# Patient Record
Sex: MALE | Race: WHITE | NOT HISPANIC OR LATINO | Employment: OTHER | URBAN - METROPOLITAN AREA
[De-identification: names, ages, dates, MRNs, and addresses within clinical notes are randomized per-mention and may not be internally consistent; named-entity substitution may affect disease eponyms.]

---

## 2017-03-27 DIAGNOSIS — Z13.6 CARDIOVASCULAR SCREENING; LDL GOAL LESS THAN 100: ICD-10-CM

## 2017-03-27 RX ORDER — ATORVASTATIN CALCIUM 20 MG/1
20 TABLET, FILM COATED ORAL DAILY
Qty: 90 TABLET | Refills: 1 | Status: SHIPPED | OUTPATIENT
Start: 2017-03-27 | End: 2017-07-20

## 2017-03-27 NOTE — TELEPHONE ENCOUNTER
atorvastatin (LIPITOR) 20 MG tablet     Last Written Prescription Date: 10/6/16  Last Fill Quantity: 90, # refills: 3  Last Office Visit with G, P or [facility] prescribing provider: 11/8/2016       Lab Results   Component Value Date    CHOL 142 11/02/2015     Lab Results   Component Value Date    HDL 58 11/02/2015     Lab Results   Component Value Date    LDL 70 11/02/2015     Lab Results   Component Value Date    TRIG 70 11/02/2015     Lab Results   Component Value Date    CHOLHDLRATIO 2.4 11/02/2015

## 2017-07-20 ENCOUNTER — TELEPHONE (OUTPATIENT)
Dept: INTERNAL MEDICINE | Facility: CLINIC | Age: 82
End: 2017-07-20

## 2017-07-20 DIAGNOSIS — Z12.5 SPECIAL SCREENING FOR MALIGNANT NEOPLASM OF PROSTATE: Primary | ICD-10-CM

## 2017-07-20 DIAGNOSIS — I10 ESSENTIAL HYPERTENSION, BENIGN: ICD-10-CM

## 2017-07-20 DIAGNOSIS — Z13.6 CARDIOVASCULAR SCREENING; LDL GOAL LESS THAN 100: ICD-10-CM

## 2017-07-20 RX ORDER — IRBESARTAN 150 MG/1
150 TABLET ORAL DAILY
Qty: 90 TABLET | Refills: 3 | Status: SHIPPED | OUTPATIENT
Start: 2017-07-20 | End: 2018-02-12

## 2017-07-20 RX ORDER — ATORVASTATIN CALCIUM 20 MG/1
20 TABLET, FILM COATED ORAL DAILY
Qty: 90 TABLET | Refills: 3 | Status: SHIPPED | OUTPATIENT
Start: 2017-07-20 | End: 2018-02-12

## 2017-08-31 ENCOUNTER — TELEPHONE (OUTPATIENT)
Dept: INTERNAL MEDICINE | Facility: CLINIC | Age: 82
End: 2017-08-31

## 2017-08-31 DIAGNOSIS — I10 ESSENTIAL HYPERTENSION, BENIGN: ICD-10-CM

## 2017-08-31 DIAGNOSIS — R26.89 BALANCE PROBLEMS: Primary | ICD-10-CM

## 2017-08-31 DIAGNOSIS — Z12.5 SPECIAL SCREENING FOR MALIGNANT NEOPLASM OF PROSTATE: ICD-10-CM

## 2017-08-31 DIAGNOSIS — Z13.6 CARDIOVASCULAR SCREENING; LDL GOAL LESS THAN 100: ICD-10-CM

## 2017-08-31 LAB
ALBUMIN SERPL-MCNC: 3.8 G/DL (ref 3.4–5)
ALP SERPL-CCNC: 90 U/L (ref 40–150)
ALT SERPL W P-5'-P-CCNC: 18 U/L (ref 0–70)
ANION GAP SERPL CALCULATED.3IONS-SCNC: 6 MMOL/L (ref 3–14)
AST SERPL W P-5'-P-CCNC: 17 U/L (ref 0–45)
BILIRUB SERPL-MCNC: 0.9 MG/DL (ref 0.2–1.3)
BUN SERPL-MCNC: 22 MG/DL (ref 7–30)
CALCIUM SERPL-MCNC: 9.6 MG/DL (ref 8.5–10.1)
CHLORIDE SERPL-SCNC: 107 MMOL/L (ref 94–109)
CHOLEST SERPL-MCNC: 152 MG/DL
CO2 SERPL-SCNC: 29 MMOL/L (ref 20–32)
CREAT SERPL-MCNC: 1.49 MG/DL (ref 0.66–1.25)
GFR SERPL CREATININE-BSD FRML MDRD: 45 ML/MIN/1.7M2
GLUCOSE SERPL-MCNC: 96 MG/DL (ref 70–99)
HDLC SERPL-MCNC: 73 MG/DL
HGB BLD-MCNC: 14.3 G/DL (ref 13.3–17.7)
LDLC SERPL CALC-MCNC: 59 MG/DL
NONHDLC SERPL-MCNC: 79 MG/DL
POTASSIUM SERPL-SCNC: 4.3 MMOL/L (ref 3.4–5.3)
PROT SERPL-MCNC: 7.3 G/DL (ref 6.8–8.8)
PSA SERPL-ACNC: 1.57 UG/L (ref 0–4)
SODIUM SERPL-SCNC: 142 MMOL/L (ref 133–144)
TRIGL SERPL-MCNC: 101 MG/DL

## 2017-08-31 PROCEDURE — 85018 HEMOGLOBIN: CPT | Performed by: INTERNAL MEDICINE

## 2017-08-31 PROCEDURE — 80061 LIPID PANEL: CPT | Performed by: INTERNAL MEDICINE

## 2017-08-31 PROCEDURE — 36415 COLL VENOUS BLD VENIPUNCTURE: CPT | Performed by: INTERNAL MEDICINE

## 2017-08-31 PROCEDURE — 80053 COMPREHEN METABOLIC PANEL: CPT | Performed by: INTERNAL MEDICINE

## 2017-08-31 PROCEDURE — G0103 PSA SCREENING: HCPCS | Performed by: INTERNAL MEDICINE

## 2017-09-07 ENCOUNTER — TELEPHONE (OUTPATIENT)
Dept: INTERNAL MEDICINE | Facility: CLINIC | Age: 82
End: 2017-09-07

## 2017-09-07 ENCOUNTER — HOSPITAL ENCOUNTER (OUTPATIENT)
Dept: PHYSICAL THERAPY | Facility: CLINIC | Age: 82
Setting detail: THERAPIES SERIES
End: 2017-09-07
Attending: INTERNAL MEDICINE
Payer: MEDICARE

## 2017-09-07 DIAGNOSIS — R26.89 BALANCE PROBLEMS: Primary | ICD-10-CM

## 2017-09-07 PROCEDURE — G8978 MOBILITY CURRENT STATUS: HCPCS | Mod: GP,CJ | Performed by: PHYSICAL THERAPIST

## 2017-09-07 PROCEDURE — G8979 MOBILITY GOAL STATUS: HCPCS | Mod: GP,CJ | Performed by: PHYSICAL THERAPIST

## 2017-09-07 PROCEDURE — 40000719 ZZHC STATISTIC PT DEPARTMENT NEURO VISIT: Performed by: PHYSICAL THERAPIST

## 2017-09-07 PROCEDURE — 97110 THERAPEUTIC EXERCISES: CPT | Mod: GP | Performed by: PHYSICAL THERAPIST

## 2017-09-07 PROCEDURE — 97162 PT EVAL MOD COMPLEX 30 MIN: CPT | Mod: GP | Performed by: PHYSICAL THERAPIST

## 2017-09-07 ASSESSMENT — 6 MINUTE WALK TEST (6MWT)
COMMENTS: NO AD
TOTAL DISTANCE WALKED (FT): 1090

## 2017-09-07 NOTE — PROGRESS NOTES
09/07/17 0800   Quick Adds   Quick Adds Certification   Type of Visit Initial OP PT Evaluation   General Information   Start of Care Date 09/07/17   Referring Physician Dr. Gian Segovia   Orders Evaluate and Treat as Indicated   Order Date 08/31/17   Medical Diagnosis Imbalance   Onset of illness/injury or Date of Surgery 02/13/17   Precautions/Limitations fall precautions   Surgical/Medical history reviewed Yes   Pertinent history of current problem (include personal factors and/or comorbidities that impact the POC) Pt retired from practice in February and has noticed a decline in his walking and arm swing and has had approximately 4  falls in the past 2 years. I'm also concerned about my walking/arm swing for possible Parkinson's symptoms.    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 Works out with a  2x/week at [employer].   Current Community Support Family/friend caregiver   Patient role/Employment history Employed   Living environment House/[facility]   Home/Community Accessibility Comments 2 level house with 2 flights of stairs between floors.    Assistive Devices Comments no AD   Patient/Family Goals Statement I want to improve my balance   General Information Comments [place], wears hearing aides   Fall Risk Screen   Fall screen completed by PT   Per patient - Fall 2 or more times in past year? Yes   Per patient - Fall with injury in past year? No   Timed Up and Go score (seconds) 11.9   Is patient a fall risk? Yes   Fall screen comments Feels the falls occured with stairs or curbs   Pain   Patient currently in pain No   Cognitive Status Examination   Orientation orientation to person, place and  time   Level of Consciousness alert   Follows Commands and Answers Questions 100% of the time   Personal Safety and Judgment intact   Memory intact   Posture   Posture Forward head position;Protracted shoulders   Posture Comments forward flexed hips in standing, eyes postured down.    Range of Motion (ROM)   ROM Comment Doesn't feel there is a difference L/R sides with ROM/coordination. bilateral hip flexor restriction in standing/gait with anterior pelvic tilt.   Bed Mobility   Bed Mobility Comments independent   Transfer Skills   Transfer Comments independent without UE assist   Gait   Gait Comments no AD, forward flexed hips, head, bilateral decreased foot clearance with audible foot scuffing at heel due to decereased swing phase. Limited bilateral arm swing. Lacks full knee extension at bilaterally, eyes postured down   Gait Special Tests Functional Gait Assessment Score out of 30   Score out of 30 21   Gait Special Tests Six Minute Walk Test   Feet 1090 Feet   Comments no AD   Balance Special Tests Modified CTSIB Conditions   Condition 1, seconds 30 Seconds   Condition 2, seconds 30 Seconds   Condition 4, seconds 30 Seconds   Condition 5, seconds 30 Seconds   Balance Special Tests Sit to Stand Reps in 30 Seconds   Reps in 30 seconds 14   Height 17   Comments Last 5 reps came to partial standing   Sensory Examination   Sensory Perception Comments denies N/T   Coordination   Coordination Comments bilateral toe tapping performed, able to keep both toes tapping in sync, right side slightly less ankle DF    Planned Therapy Interventions   Planned Therapy Interventions balance training;gait training;motor coordination training;neuromuscular re-education;stretching;manual therapy   Clinical Impression   Criteria for Skilled Therapeutic Interventions Met yes, treatment indicated   PT Diagnosis difficulty with gait   Influenced by the following impairments field cut, impaired dynamic balance, difficulty dual tasking,  impaired gait mechanics with bilateral decreased foot clearance.   Functional limitations due to impairments difficulty with gait and balance, fall risk, difficutly naviagting stairs   Clinical Presentation Evolving/Changing   Clinical Presentation Rationale concern for Parkinson's Disease/Parkinsonism like symptoms   Clinical Decision Making (Complexity) Moderate complexity   Therapy Frequency 1 time/week   Predicted Duration of Therapy Intervention (days/wks) 45 days   Risk & Benefits of therapy have been explained Yes   Patient, Family & other staff in agreement with plan of care Yes   Education Assessment   Preferred Learning Style Listening   Barriers to Learning Hearing   GOALS   PT Eval Goals 1;2;3   Goal 1   Goal Identifier 6MWT   Goal Description 1. Patient will improved distance ambulated by 150feet on 6MWT in order to improve gait mechanics and speed.   Target Date 10/22/17   Goal 2   Goal Identifier FGA   Goal Description 2. Patient will improve score to greater than 24/30 on the FGA in order to improve safety and balance during dual tasking activities.   Target Date 10/22/17   Total Evaluation Time   Total Evaluation Time (Minutes) 35   Therapy Certification   Certification date from 09/07/17   Certification date to 10/22/17   Medical Diagnosis Imbalance

## 2017-09-07 NOTE — PROGRESS NOTES
[facility]        OUTPATIENT PHYSICAL THERAPY FUNCTIONAL EVALUATION  PLAN OF TREATMENT FOR OUTPATIENT REHABILITATION  (COMPLETE FOR INITIAL CLAIMS ONLY)  Patient's Last Name, First Name, M.I.  YOB: 1935  Leonard Garza     Provider's Name   [facility]   Medical Record No.  [id number]     Start of Care Date:  09/07/17   Onset Date:  02/13/17   Type:     _X__PT   ____OT  ____SLP Medical Diagnosis:  Imbalance     PT Diagnosis:  difficulty with gait Visits from SOC:  1                              __________________________________________________________________________________  Plan of Treatment/Functional Goals:  balance training, gait training, motor coordination training, neuromuscular re-education, stretching, manual therapy           GOALS  6MWT  1. Patient will improved distance ambulated by 150feet on 6MWT in order to improve gait mechanics and speed.  10/22/17    FGA  2. Patient will improve score to greater than 24/30 on the FGA in order to improve safety and balance during dual tasking activities.  10/22/17    Therapy Frequency:  1 time/week   Predicted Duration of Therapy Intervention:  45 days    Nova Goss, PT                                    I CERTIFY THE NEED FOR THESE SERVICES FURNISHED UNDER        THIS PLAN OF TREATMENT AND WHILE UNDER MY CARE     (Physician co-signature of this document indicates review and certification of the therapy plan).                  Certification Date From:  09/07/17   Certification Date To:  10/22/17    Referring Provider:  Dr. Gian Segovia    Initial Assessment  See Epic Evaluation- Start of Care Date: 09/07/17

## 2017-09-20 ENCOUNTER — TRANSFERRED RECORDS (OUTPATIENT)
Dept: HEALTH INFORMATION MANAGEMENT | Facility: CLINIC | Age: 82
End: 2017-09-20

## 2017-09-28 ENCOUNTER — HOSPITAL ENCOUNTER (OUTPATIENT)
Dept: PHYSICAL THERAPY | Facility: CLINIC | Age: 82
Setting detail: THERAPIES SERIES
End: 2017-09-28
Attending: INTERNAL MEDICINE
Payer: MEDICARE

## 2017-09-28 PROCEDURE — 97110 THERAPEUTIC EXERCISES: CPT | Mod: GP | Performed by: PHYSICAL THERAPIST

## 2017-09-28 PROCEDURE — 97116 GAIT TRAINING THERAPY: CPT | Mod: GP | Performed by: PHYSICAL THERAPIST

## 2017-09-28 PROCEDURE — 40000719 ZZHC STATISTIC PT DEPARTMENT NEURO VISIT: Performed by: PHYSICAL THERAPIST

## 2017-10-12 ENCOUNTER — HOSPITAL ENCOUNTER (OUTPATIENT)
Dept: PHYSICAL THERAPY | Facility: CLINIC | Age: 82
Setting detail: THERAPIES SERIES
End: 2017-10-12
Attending: INTERNAL MEDICINE
Payer: MEDICARE

## 2017-10-12 PROCEDURE — 97112 NEUROMUSCULAR REEDUCATION: CPT | Mod: GP | Performed by: PHYSICAL THERAPIST

## 2017-10-12 PROCEDURE — G8979 MOBILITY GOAL STATUS: HCPCS | Mod: GP,CJ | Performed by: PHYSICAL THERAPIST

## 2017-10-12 PROCEDURE — 97110 THERAPEUTIC EXERCISES: CPT | Mod: GP | Performed by: PHYSICAL THERAPIST

## 2017-10-12 PROCEDURE — 40000719 ZZHC STATISTIC PT DEPARTMENT NEURO VISIT: Performed by: PHYSICAL THERAPIST

## 2017-10-12 PROCEDURE — G8980 MOBILITY D/C STATUS: HCPCS | Mod: GP,CJ | Performed by: PHYSICAL THERAPIST

## 2017-10-12 PROCEDURE — 97750 PHYSICAL PERFORMANCE TEST: CPT | Mod: GP | Performed by: PHYSICAL THERAPIST

## 2017-10-12 NOTE — PROGRESS NOTES
Outpatient Physical Therapy Discharge Note     Patient: Leonard Garza  : 1935    Beginning/End Dates of Reporting Period:  17 to 10/12/2017    Referring Provider: Dr. Gian Fuller    Therapy Diagnosis: Difficulty with gait     Client Self Report: Reports that he is doing well, feels that he is paying more attention to his walking.     Objective Measurements:  Objective Measure: 6MWT  Details: 1245  Objective Measure: FGA  Details:       Goals:  Goal Identifier 6MWT   Goal Description 1. Patient will improved distance ambulated by 150feet on 6MWT in order to improve gait mechanics and speed.   Target Date 10/22/17   Date Met   10/12/17   Progress: Improved from 1090 at initial eval to 1245'     Goal Identifier FGA   Goal Description 2. Patient will improve score to greater than 24/30 on the FGA in order to improve safety and balance during dual tasking activities.   Target Date 10/22/17   Date Met   10/12/17   Progress: Improved from 21/30 to 24/30       Progress Toward Goals:   Progress this reporting period: The pt demonstrated improvement in gait speed and balance this reporting period. FGA score improved from 21 to a 42/30, indicating a reduction in fall risk. The pt also improved his 6 minute walk distance from 1090' to 1245'. The pt responds well to cues to lengthen stride and improve foot clearance. Demonstrates understanding with HEP.      Plan:  Discharge from therapy.    Discharge:    Reason for Discharge: Patient has met all goals.    Discharge Plan: Patient to continue home program.

## 2017-10-12 NOTE — PROGRESS NOTES
10/12/17 0800   Signing Clinician's Name / Credentials   Signing clinician's name / credentials SÁNCHEZ Norton PT   Functional Gait Assessment (ALL Johns, LESLIE Lucas, et al. (2004))   1. GAIT LEVEL SURFACE 3   2. CHANGE IN GAIT SPEED 3   3. GAIT WITH HORIZONTAL HEAD TURNS 2   4. GAIT WITH VERTICAL HEAD TURNS 3   5. GAIT AND PIVOT TURN 3   6. STEP OVER OBSTACLE 3   7. GAIT WITH NARROW BASE OF SUPPORT 0   8. GAIT WITH EYES CLOSED 1   9. AMBULATING BACKWARDS 3   10. STEPS 3   Total Functional Gait Assessment Score   TOTAL SCORE: (MAXIMUM SCORE 30) 24   Functional Gait Assessment (FGA): The FGA assesses postural stability during various walking tasks.   Gait assistive device used: no AD     Patient Score: 24/30  Scores of <22/30 have been correlated with predicting falls in community-dwelling older adults according to Nat & Tor 2010.   Scores of <18/30 have been correlated with increased risk for falls in patients with Parkinsons Disease according to Carlos Cao, Ge et al 2014.  Minimal Detectable Change for patients with acute/chronic stroke = 4.2 according to Thishara & Ritschel 2009  Minimal Detectable Change for patients with vestibular disorder = 8 according to Nat & Tor 2010    Assessment (rationale for performing, application to patient s function & care plan): The pt demonstrated improvement with gait speed, stability with head turns, retroambulation and stairs. Continues to be challenged  By narrowed base, EC. Discussed incorporating these into HEP at home, being cautious in low lighting.  Minutes billed as physical performance test: 10

## 2017-11-14 ENCOUNTER — TRANSFERRED RECORDS (OUTPATIENT)
Dept: HEALTH INFORMATION MANAGEMENT | Facility: CLINIC | Age: 82
End: 2017-11-14

## 2017-11-20 ENCOUNTER — TRANSFERRED RECORDS (OUTPATIENT)
Dept: HEALTH INFORMATION MANAGEMENT | Facility: CLINIC | Age: 82
End: 2017-11-20

## 2018-02-12 ENCOUNTER — TELEPHONE (OUTPATIENT)
Dept: INTERNAL MEDICINE | Facility: CLINIC | Age: 83
End: 2018-02-12

## 2018-02-12 DIAGNOSIS — Z13.6 CARDIOVASCULAR SCREENING; LDL GOAL LESS THAN 100: ICD-10-CM

## 2018-02-12 DIAGNOSIS — I10 ESSENTIAL HYPERTENSION, BENIGN: Primary | ICD-10-CM

## 2018-02-12 RX ORDER — IRBESARTAN 150 MG/1
150 TABLET ORAL DAILY
Qty: 90 TABLET | Refills: 3 | Status: SHIPPED | OUTPATIENT
Start: 2018-02-12 | End: 2019-02-25

## 2018-02-12 RX ORDER — ATORVASTATIN CALCIUM 20 MG/1
20 TABLET, FILM COATED ORAL DAILY
Qty: 90 TABLET | Refills: 3 | Status: SHIPPED | OUTPATIENT
Start: 2018-02-12 | End: 2019-02-25

## 2018-02-12 NOTE — TELEPHONE ENCOUNTER
Received a call from patient as is in [place] and needs refills of blood pressure medication.  His wife will

## 2018-04-09 ENCOUNTER — TRANSFERRED RECORDS (OUTPATIENT)
Dept: HEALTH INFORMATION MANAGEMENT | Facility: CLINIC | Age: 83
End: 2018-04-09

## 2018-04-24 ENCOUNTER — HOSPITAL ENCOUNTER (OUTPATIENT)
Dept: SPEECH THERAPY | Facility: CLINIC | Age: 83
Setting detail: THERAPIES SERIES
End: 2018-04-24
Attending: PSYCHIATRY & NEUROLOGY
Payer: MEDICARE

## 2018-04-24 PROCEDURE — G9171 VOICE CURRENT STATUS: HCPCS | Mod: GN,CJ | Performed by: STUDENT IN AN ORGANIZED HEALTH CARE EDUCATION/TRAINING PROGRAM

## 2018-04-24 PROCEDURE — G9172 VOICE GOAL STATUS: HCPCS | Mod: GN,CI | Performed by: STUDENT IN AN ORGANIZED HEALTH CARE EDUCATION/TRAINING PROGRAM

## 2018-04-24 PROCEDURE — 92524 BEHAVRAL QUALIT ANALYS VOICE: CPT | Mod: GN | Performed by: STUDENT IN AN ORGANIZED HEALTH CARE EDUCATION/TRAINING PROGRAM

## 2018-04-24 PROCEDURE — 40000251 ZZH STATISTIC VOICE CENTER VISIT: Performed by: STUDENT IN AN ORGANIZED HEALTH CARE EDUCATION/TRAINING PROGRAM

## 2018-04-25 NOTE — PROGRESS NOTES
"                                                                           [facility]          OUTPATIENT SPEECH LANGUAGE PATHOLOGY VOICE EVALUATION  PLAN OF TREATMENT FOR OUTPATIENT REHABILITATION  (COMPLETE FOR INITIAL CLAIMS ONLY)    Patient's Last Name, First Name, M.I.  YOB: 1935  Leonard Garza                        Provider s Name: [facility] Medical Record No.  [id number]     Onset Date:  4/9/2018 (order date)    Start of Care Date:  4/24/2018   Type:     ___PT  __OT   _X_SLP    Medical Diagnosis: Parkinson's disease   Speech Language Pathology Diagnosis:  Dysphonia, hypokinetic dysarthria    Visits from SOC: 1      _________________________________________________________________________________  Plan of Treatment/Functional Goals:  Voice         Goals     1. Goal Identifier: Generalization       Goal Description: Patient will learn, demonstrate, and implement a \"loud\" compensatory speech intelligibility strategy in order to report a week of typical activities in which dysphonia does not exceed a level of 2 out of 10, 90% of the time so that patient is able to effectively communicate with friends/family in a variety of daily communication situations.       Target Date: 07/23/18   2. Goal Identifier: Breath       Goal Description: Patient will increase vocal loudness to an average SPL of 80 dB at 30 cm during sustained /a/ phonation independently in order to increase vocal respiratory support required for functional communication.       Target Date: 07/23/18   3. Goal Identifier: Conversation       Goal Description: Patient will increase vocal loudness during semi-spontaneous conversation tasks to a minimum range of 75-80 dB at 30 cm given min cues in order to improve vocal loudness for daily communication tasks.       Target Date: 07/23/18                     Frequency and Duration: 4x/week for 4 weeks per LSVT LOUD protocol, with 1-2 monthly " follow-ups.  Allison E. Alpers, SLP       I CERTIFY THE NEED FOR THESE SERVICES FURNISHED UNDER        THIS PLAN OF TREATMENT AND WHILE UNDER MY CARE     (Physician co-signature of this document indicates review and certification of the therapy plan).                Certification Date From:  04/24/18  Certification Date To:  07/23/18  Referring Provider: Bartolome Vila MD               Initial Assessment        See Epic Evaluation Start of Care

## 2018-04-25 NOTE — PROGRESS NOTES
"[facility] OP SLP Voice Evaluation  04/24/18 0800   General Information   Type Of Visit Initial   Start Of Care Date 04/24/18   Referring Physician Bartolome Vila MD  (Neurology)   Orders Evaluate And Treat   Medical Diagnosis Parkinson's disease   Onset Of Illness/injury Or Date Of Surgery 04/09/18  (order date)   Precautions/Limitations fall precautions  (Also seeing PT)   Hearing Hearing loss--wears bilateral hearing aids   Surgical/Medical history reviewed Yes   Pertinent History Of Current Problem 82yo male presenting with voice and gait changes concerning for Parkinson's disease (PD).  Pt reports that his voice is quieter and raspy and his stride has gotten shorter.  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  Pt reports that people occasionally have difficulty understanding him because of his voice, and he is able to increase his vocal loudness during these times to be better understood.  Pt has not noticed any changes in his speech/articulation, but notes that his wife says he sounds like [place], leaving off the \"g\" in words ending with \"-ing\".  Pt reports frequent coughing with meals, which improves with an OTC antacid.  He does not feel that foods/liquids are going down the wrong way, but rather feels the cough is from esophageal irritation.  PMH significant for left carotid stenosis and family Hx of PD (brother, uncle, cousins).  Pt denies resting tremor and handwriting change.   Prior Level of Functioning No previous problems.   Patient Role/employment History Retired  (OB/GYN)   General Observations Pt reports that today is a typical voice day, noting that his voice is a little raspy and he feels that he is speaking louder than normal.   Patient/family Goals To know whether or not he has PD, to improve his voice/speech as much " as possible   Fall Risk Screen   Fall screen completed by PT   Personal Rating / Voice Use Rating   Describe the amount of typical non-work voice use Moderate   Describe the intensity of typical non-work voice use Moderate   Comments Voice demands consist of conversational speech.  People occasionally have difficulty hearing and understanding him because of his voice.  VHI-10: 6/40.   Evaluation Results   Voice Observations Pt has masked facies throughout all evaluation tasks.   Voice Profile during conversation, 1 min monologue and paragraph reading   Voice Quality Raspy;Breathy   Voice quality comments SPEECH: Frequent intermittent mild-moderate roughness and intermittent mild-moderate breathiness.  SINGING: Improved relative to speech, with good volume and minimal roughness/breathiness.  VOWEL PROLONGATION: comfortable pitch /a/: Ab2, consistent roughness and mild breathiness; high pitch /a/: B3, mild breathiness with less roughness and intermittent pitch instability; low pitch /a/: F2, mild roughness, breathiness, and strain.  THERAPY PROBES:  Clear voice quality and appropriate volume with increased loundess probes.   Voice quality severity rating continuum (1=Severe, 7=WNL) 5  (CAPE-V Overall Severity: 36/100)   Breath control Irregular   Breath Control comments Acceptable abdominal/thoracic muscle use pattern on inspiration for speech, with appropriate volume of inspiration.  Intermittently poor respiratory/phonatory coordination is noted during speaking tasks, however.   Breath control severity rating continuum (1=Severe, 7=WNL) 6   Voice Use / Effort WNL   Voice Use / Effort comments Pt rates his phonatory effort for speech as 1-2/10 (10 is maximum effort).   Voice use / Effort severity rating continuum (1=Severe, 7=WNL) 6   Fundamental frequency (Hz) (Centered around A2)   Pitch /Frequency Description Monotone   Pitch / Frequency severity rating continuum (1=Severe, 7=WNL) 5   Average dB 70 dB   Volume Too  quiet   Volume comments Volume for conversational speech is at the low end of normal limits and appropriate for the setting, although pt notes that he is talking louder today than usual.  Volume outside of clinic is likely below the normal range.  A whisper is normal.  Soft phonation is mildly breathy but less rough than conversational speech.  Loud phonation is clear with an appropriate volume.   Volume severity rating continuum (1=Severe, 7=WNL) 5   Neuromuscular Control Hypokinetic   Neuromuscular Control severity rating continuum (1=Severe, 7=WNL) 5   Neuromuscular comments See adduction/abduction function measure below.  Speech has a slurred quality at times, with intermittently imprecise articulation.  Labial AMRs are WNL, but anterior/posterior lingual AMRs are intermittently irregular in rhythm.  Labial/lingual SMRs are WNL with regard to speed and rhythm, but the vowel was noted to change throughout this task from a neutral schwa.   Resonance WNL   Resonance severity rating continuum (1=Severe, 7=WNL) 7   Comments Mild-moderate dysphonia and hypokinetic dysarthria characterized by roughness, breathiness, reduced phonatory volume, intermittently imprecise articulation, monotone intonation, intermittently poor respiratory/phonatory coordination, and masked facies.   Adduction /Abduction Function   Laryngeal diadokinetic speed (Slow)   Laryngeal diadokinetic strength Weak   Laryngeal diadokinetic consistency Run together   Adduction / Abduction function scale Age 66+, norm per sec:  4   Vibratory Function of Vocal Folds   Prolonged 'ah' at mid pitch (sec) 32.7  (At Ab2)   Vibratory Function of Vocal Folds Scale Males 20 - 80 yrs: 15 - 25 secs   Compression Strength of Vocal Folds / Larynx Muscles   Vowels average volume dB 74   Reading aloud average volume dB 72   Conversation average volume dB 70   Dynamic volume range minimum dB 62   Dynamic volume range maximum dB 80   Efficiency of increased volume Relaxed  "throat   Function of Lengtheners / Shorteners (CT and TA Muscles)   Pitch glides Upper register present  (Lowest: Ab2; Highest: A4, triggered a cough)   General Therapy Interventions   Planned Therapy Interventions Voice   Voice LSVT generalized to community setting;Larynx strengthening;Larynx and TVF flexibility;Voice quality/pitch or volume tasks   Impressions and Recommendations   Communication Diagnosis Dysphonia, hypokinetic dysarthria   Summary 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  Along with his gait symptoms, voice and speech symptoms are consistent with, but not diagnostic of, Parkinson's disease.   Recommendations It is recommended that patient complete the LSVT LOUD program in order to improve vocal volume, voice quality, and laryngeal strength for daily conversational demands.   Frequency and Duration 4x/week for 4 weeks per LSVT LOUD protocol, with 1-2 monthly follow-ups.   Prognosis  Good with intervention   Risks and Benefits of Treatment have been explained. Yes   Patient & /or Caregiver  in agreement with plan of care Yes   Patient Education SLP provided education regarding voice/speech related PD symptoms vs presbyphonia symptoms, as well as indications that he would benefit from the LSVT LOUD program.   Educational Assessment   Barriers to Learning No barriers   Preferred Learning Style Listening;Reading;Demonstration;Pictures / Video   Voice Goals   Voice Goals 1;2;3   Voice Goal 1   Goal Identifier Generalization   Goal Description Patient will learn, demonstrate, and implement a \"loud\" compensatory speech intelligibility strategy in order to report a week of typical activities in which dysphonia does not exceed a level of 2 out of 10, 90% of the time so " that patient is able to effectively communicate with friends/family in a variety of daily communication situations.   Target Date 07/23/18   Voice Goal 2   Goal Identifier Breath   Goal Description Patient will increase vocal loudness to an average SPL of 80 dB at 30 cm during sustained /a/ phonation independently in order to increase vocal respiratory support required for functional communication.   Target Date 07/23/18   Voice Goal 3   Goal Identifier Conversation   Goal Description Patient will increase vocal loudness during semi-spontaneous conversation tasks to a minimum range of 75-80 dB at 30 cm given min cues in order to improve vocal loudness for daily communication tasks.   Target Date 07/23/18   Total Session Time   Total Session Time 45   Total Evaluation Time 45   Therapy Certification   Certification date from 04/24/18   Certification date to 07/23/18   Medical Diagnosis Parkinson's disease   Certification I certify the need for these services furnished under this plan of treatment and while under my care.  (Physician co-signature of this document indicates review and certification of the therapy plan).       Thank you for the referral of this patient.    Allison Alpers, B.A. (khadra), M.A., CCC-SLP  Speech-Language Pathologist  Certificate of Vocology  [facility]  [phone]

## 2018-05-15 ENCOUNTER — TRANSFERRED RECORDS (OUTPATIENT)
Dept: HEALTH INFORMATION MANAGEMENT | Facility: CLINIC | Age: 83
End: 2018-05-15

## 2018-05-21 ENCOUNTER — HOSPITAL ENCOUNTER (OUTPATIENT)
Dept: SPEECH THERAPY | Facility: CLINIC | Age: 83
Setting detail: THERAPIES SERIES
End: 2018-05-21
Attending: PSYCHIATRY & NEUROLOGY
Payer: MEDICARE

## 2018-05-21 PROCEDURE — 92507 TX SP LANG VOICE COMM INDIV: CPT | Mod: GN | Performed by: STUDENT IN AN ORGANIZED HEALTH CARE EDUCATION/TRAINING PROGRAM

## 2018-05-21 PROCEDURE — 40000251 ZZH STATISTIC VOICE CENTER VISIT: Performed by: STUDENT IN AN ORGANIZED HEALTH CARE EDUCATION/TRAINING PROGRAM

## 2018-05-22 ENCOUNTER — HOSPITAL ENCOUNTER (OUTPATIENT)
Dept: SPEECH THERAPY | Facility: CLINIC | Age: 83
Setting detail: THERAPIES SERIES
End: 2018-05-22
Attending: PSYCHIATRY & NEUROLOGY
Payer: MEDICARE

## 2018-05-22 PROCEDURE — 92507 TX SP LANG VOICE COMM INDIV: CPT | Mod: GN | Performed by: STUDENT IN AN ORGANIZED HEALTH CARE EDUCATION/TRAINING PROGRAM

## 2018-05-22 PROCEDURE — 40000251 ZZH STATISTIC VOICE CENTER VISIT: Performed by: STUDENT IN AN ORGANIZED HEALTH CARE EDUCATION/TRAINING PROGRAM

## 2018-05-23 ENCOUNTER — HOSPITAL ENCOUNTER (OUTPATIENT)
Dept: SPEECH THERAPY | Facility: CLINIC | Age: 83
Setting detail: THERAPIES SERIES
End: 2018-05-23
Attending: PSYCHIATRY & NEUROLOGY
Payer: MEDICARE

## 2018-05-23 PROCEDURE — 40000251 ZZH STATISTIC VOICE CENTER VISIT: Performed by: STUDENT IN AN ORGANIZED HEALTH CARE EDUCATION/TRAINING PROGRAM

## 2018-05-23 PROCEDURE — 92507 TX SP LANG VOICE COMM INDIV: CPT | Mod: GN | Performed by: STUDENT IN AN ORGANIZED HEALTH CARE EDUCATION/TRAINING PROGRAM

## 2018-05-24 ENCOUNTER — HOSPITAL ENCOUNTER (OUTPATIENT)
Dept: SPEECH THERAPY | Facility: CLINIC | Age: 83
Setting detail: THERAPIES SERIES
End: 2018-05-24
Attending: PSYCHIATRY & NEUROLOGY
Payer: MEDICARE

## 2018-05-24 PROCEDURE — 40000251 ZZH STATISTIC VOICE CENTER VISIT: Performed by: STUDENT IN AN ORGANIZED HEALTH CARE EDUCATION/TRAINING PROGRAM

## 2018-05-24 PROCEDURE — 92507 TX SP LANG VOICE COMM INDIV: CPT | Mod: GN | Performed by: STUDENT IN AN ORGANIZED HEALTH CARE EDUCATION/TRAINING PROGRAM

## 2018-05-31 ENCOUNTER — TELEPHONE (OUTPATIENT)
Dept: INTERNAL MEDICINE | Facility: CLINIC | Age: 83
End: 2018-05-31

## 2018-05-31 DIAGNOSIS — I10 ESSENTIAL HYPERTENSION, BENIGN: Primary | ICD-10-CM

## 2018-06-04 ENCOUNTER — HOSPITAL ENCOUNTER (OUTPATIENT)
Dept: SPEECH THERAPY | Facility: CLINIC | Age: 83
Setting detail: THERAPIES SERIES
End: 2018-06-04
Attending: PSYCHIATRY & NEUROLOGY
Payer: MEDICARE

## 2018-06-04 PROCEDURE — 40000251 ZZH STATISTIC VOICE CENTER VISIT: Performed by: STUDENT IN AN ORGANIZED HEALTH CARE EDUCATION/TRAINING PROGRAM

## 2018-06-04 PROCEDURE — 92507 TX SP LANG VOICE COMM INDIV: CPT | Mod: GN | Performed by: STUDENT IN AN ORGANIZED HEALTH CARE EDUCATION/TRAINING PROGRAM

## 2018-06-05 ENCOUNTER — HOSPITAL ENCOUNTER (OUTPATIENT)
Dept: SPEECH THERAPY | Facility: CLINIC | Age: 83
Setting detail: THERAPIES SERIES
End: 2018-06-05
Attending: PSYCHIATRY & NEUROLOGY
Payer: MEDICARE

## 2018-06-05 PROCEDURE — 40000251 ZZH STATISTIC VOICE CENTER VISIT: Performed by: STUDENT IN AN ORGANIZED HEALTH CARE EDUCATION/TRAINING PROGRAM

## 2018-06-05 PROCEDURE — 92507 TX SP LANG VOICE COMM INDIV: CPT | Mod: GN | Performed by: STUDENT IN AN ORGANIZED HEALTH CARE EDUCATION/TRAINING PROGRAM

## 2018-06-06 ENCOUNTER — HOSPITAL ENCOUNTER (OUTPATIENT)
Dept: SPEECH THERAPY | Facility: CLINIC | Age: 83
Setting detail: THERAPIES SERIES
End: 2018-06-06
Attending: PSYCHIATRY & NEUROLOGY
Payer: MEDICARE

## 2018-06-06 PROCEDURE — 92507 TX SP LANG VOICE COMM INDIV: CPT | Mod: GN | Performed by: STUDENT IN AN ORGANIZED HEALTH CARE EDUCATION/TRAINING PROGRAM

## 2018-06-06 PROCEDURE — 40000251 ZZH STATISTIC VOICE CENTER VISIT: Performed by: STUDENT IN AN ORGANIZED HEALTH CARE EDUCATION/TRAINING PROGRAM

## 2018-06-07 ENCOUNTER — HOSPITAL ENCOUNTER (OUTPATIENT)
Dept: SPEECH THERAPY | Facility: CLINIC | Age: 83
Setting detail: THERAPIES SERIES
End: 2018-06-07
Attending: PSYCHIATRY & NEUROLOGY
Payer: MEDICARE

## 2018-06-07 PROCEDURE — 92507 TX SP LANG VOICE COMM INDIV: CPT | Mod: GN | Performed by: STUDENT IN AN ORGANIZED HEALTH CARE EDUCATION/TRAINING PROGRAM

## 2018-06-07 PROCEDURE — 40000251 ZZH STATISTIC VOICE CENTER VISIT: Performed by: STUDENT IN AN ORGANIZED HEALTH CARE EDUCATION/TRAINING PROGRAM

## 2018-06-11 ENCOUNTER — HOSPITAL ENCOUNTER (OUTPATIENT)
Dept: SPEECH THERAPY | Facility: CLINIC | Age: 83
Setting detail: THERAPIES SERIES
End: 2018-06-11
Attending: PSYCHIATRY & NEUROLOGY
Payer: MEDICARE

## 2018-06-11 PROCEDURE — 40000251 ZZH STATISTIC VOICE CENTER VISIT: Performed by: STUDENT IN AN ORGANIZED HEALTH CARE EDUCATION/TRAINING PROGRAM

## 2018-06-11 PROCEDURE — G9171 VOICE CURRENT STATUS: HCPCS | Mod: GN,CJ | Performed by: STUDENT IN AN ORGANIZED HEALTH CARE EDUCATION/TRAINING PROGRAM

## 2018-06-11 PROCEDURE — G9172 VOICE GOAL STATUS: HCPCS | Mod: GN,CI | Performed by: STUDENT IN AN ORGANIZED HEALTH CARE EDUCATION/TRAINING PROGRAM

## 2018-06-11 PROCEDURE — 92507 TX SP LANG VOICE COMM INDIV: CPT | Mod: GN | Performed by: STUDENT IN AN ORGANIZED HEALTH CARE EDUCATION/TRAINING PROGRAM

## 2018-06-11 NOTE — PROGRESS NOTES
"Outpatient Speech Language Pathology Progress Note     Patient: Leonard Garza  : 1935    Beginning/End Dates of Reporting Period:  2018 to 2018; 10th visit note    Referring Provider: Dr. Vila    Therapy Diagnosis: Dysphonia, hypokinetic dysarthria    Client Self Report: Patient reports no updates since the last session.  He notes that his voice is feeling a little hoarse today.     Objective Measurements:      Pt completed the previously trained LSVT daily tasks when given min cues from SLP to use the loud technique.  Pt completed the maximum sustained /a/ x15 with an average duration of 29 seconds and an average loudness of 76 dB.  Pt completed the maximum fundamental frequency range (High/Low) tasks x15 each in a range of F#2-C4.  Pt demonstrating one instance of register breaking in the High task today, with SLP providing instruction to complete the task below the transition to loft register.  Pt read aloud the 10 functional phrases x5 with an average loudness of 76 dB given no-min cues.  SLP trained use of the loud and clear techniques in narrative length reading material today.  Given min-mod cues from SLP to use the techniques, as well as cues to take adequate frequency/volume of inspirations take sips of water to reduce vocal hoarseness, pt read aloud newspaper and magazine articles in a loudness range of 68-77 dB, typically centered around 71 dB.         Goals:  Goal Identifier Generalization   Goal Description Patient will learn, demonstrate, and implement a \"loud\" compensatory speech intelligibility strategy in order to report a week of typical activities in which dysphonia does not exceed a level of 2 out of 10, 90% of the time so that patient is able to effectively communicate with friends/family in a variety of daily communication situations.   Target Date 18   Date Met      Progress:  Good, goal not met.  Patient reporting that people have told him that they notice his " "voice is improving.     Goal Identifier Breath   Goal Description Patient will increase vocal loudness to an average SPL of 80 dB at 30 cm during sustained /a/ phonation independently in order to increase vocal respiratory support required for functional communication.   Target Date 07/23/18   Date Met      Progress: Good, goal not met per objective measures above.     Goal Identifier Conversation   Goal Description Patient will increase vocal loudness during semi-spontaneous conversation tasks to a minimum range of 75-80 dB at 30 cm given min cues in order to improve vocal loudness for daily communication tasks.   Target Date 07/23/18   Date Met      Progress:  Good, goal not met per objective measures above.  Spontaneous \"off the cuff\" responses typically occur in a loudness range of 65-70 dB per treatment note from 6/7/18.       Progress Toward Goals:    Progress this reporting period: Good, recommend continuation of LSVT LOUD program.  Patient has demonstrated good improvements from baseline in vocal loudness, voice quality, and speech intelligibility through participation in the LSVT LOUD program.  Patient continues to benefit from verbal cues from SLP to maintain use of the loud technique in extended narrative reading and spontaneous utterances during the session, although he is becoming increasingly independent in completing the LSVT LOUD daily tasks.  Patient has maintained a regular home program of practice in accordance with LSVT LOUD recommendations.  Patient will benefit from completion of the LSVT LOUD program to promote continued voice and speech improvements, as well as generalization and maintenance of improvements.      Plan:  Continue therapy per current plan of care.    Discharge:  No      Allison Alpers, B.A. (music), M.A., CCC-SLP  Speech-Language Pathologist  Certificate of Vocology  [facility]  [phone]      "

## 2018-06-12 ENCOUNTER — HOSPITAL ENCOUNTER (OUTPATIENT)
Dept: SPEECH THERAPY | Facility: CLINIC | Age: 83
Setting detail: THERAPIES SERIES
End: 2018-06-12
Attending: PSYCHIATRY & NEUROLOGY
Payer: MEDICARE

## 2018-06-12 PROCEDURE — 40000251 ZZH STATISTIC VOICE CENTER VISIT: Performed by: STUDENT IN AN ORGANIZED HEALTH CARE EDUCATION/TRAINING PROGRAM

## 2018-06-12 PROCEDURE — 92507 TX SP LANG VOICE COMM INDIV: CPT | Mod: GN | Performed by: STUDENT IN AN ORGANIZED HEALTH CARE EDUCATION/TRAINING PROGRAM

## 2018-06-13 ENCOUNTER — HOSPITAL ENCOUNTER (OUTPATIENT)
Dept: SPEECH THERAPY | Facility: CLINIC | Age: 83
Setting detail: THERAPIES SERIES
End: 2018-06-13
Attending: PSYCHIATRY & NEUROLOGY
Payer: MEDICARE

## 2018-06-13 PROCEDURE — 92507 TX SP LANG VOICE COMM INDIV: CPT | Mod: GN | Performed by: STUDENT IN AN ORGANIZED HEALTH CARE EDUCATION/TRAINING PROGRAM

## 2018-06-13 PROCEDURE — 40000251 ZZH STATISTIC VOICE CENTER VISIT: Performed by: STUDENT IN AN ORGANIZED HEALTH CARE EDUCATION/TRAINING PROGRAM

## 2018-06-14 ENCOUNTER — HOSPITAL ENCOUNTER (OUTPATIENT)
Dept: SPEECH THERAPY | Facility: CLINIC | Age: 83
Setting detail: THERAPIES SERIES
End: 2018-06-14
Attending: PSYCHIATRY & NEUROLOGY
Payer: MEDICARE

## 2018-06-14 PROCEDURE — 92507 TX SP LANG VOICE COMM INDIV: CPT | Mod: GN | Performed by: STUDENT IN AN ORGANIZED HEALTH CARE EDUCATION/TRAINING PROGRAM

## 2018-06-14 PROCEDURE — 40000251 ZZH STATISTIC VOICE CENTER VISIT: Performed by: STUDENT IN AN ORGANIZED HEALTH CARE EDUCATION/TRAINING PROGRAM

## 2018-06-19 ENCOUNTER — HOSPITAL ENCOUNTER (OUTPATIENT)
Dept: SPEECH THERAPY | Facility: CLINIC | Age: 83
Setting detail: THERAPIES SERIES
End: 2018-06-19
Attending: PSYCHIATRY & NEUROLOGY
Payer: MEDICARE

## 2018-06-19 PROCEDURE — 40000251 ZZH STATISTIC VOICE CENTER VISIT: Performed by: STUDENT IN AN ORGANIZED HEALTH CARE EDUCATION/TRAINING PROGRAM

## 2018-06-19 PROCEDURE — 92507 TX SP LANG VOICE COMM INDIV: CPT | Mod: GN | Performed by: STUDENT IN AN ORGANIZED HEALTH CARE EDUCATION/TRAINING PROGRAM

## 2018-06-21 ENCOUNTER — HOSPITAL ENCOUNTER (OUTPATIENT)
Dept: SPEECH THERAPY | Facility: CLINIC | Age: 83
Setting detail: THERAPIES SERIES
End: 2018-06-21
Attending: PSYCHIATRY & NEUROLOGY
Payer: MEDICARE

## 2018-06-21 PROCEDURE — 40000251 ZZH STATISTIC VOICE CENTER VISIT: Performed by: STUDENT IN AN ORGANIZED HEALTH CARE EDUCATION/TRAINING PROGRAM

## 2018-06-21 PROCEDURE — 92507 TX SP LANG VOICE COMM INDIV: CPT | Mod: GN | Performed by: STUDENT IN AN ORGANIZED HEALTH CARE EDUCATION/TRAINING PROGRAM

## 2018-06-26 ENCOUNTER — HOSPITAL ENCOUNTER (OUTPATIENT)
Dept: SPEECH THERAPY | Facility: CLINIC | Age: 83
Setting detail: THERAPIES SERIES
End: 2018-06-26
Attending: PSYCHIATRY & NEUROLOGY
Payer: MEDICARE

## 2018-06-26 PROCEDURE — 40000251 ZZH STATISTIC VOICE CENTER VISIT: Performed by: STUDENT IN AN ORGANIZED HEALTH CARE EDUCATION/TRAINING PROGRAM

## 2018-06-26 PROCEDURE — 92507 TX SP LANG VOICE COMM INDIV: CPT | Mod: GN | Performed by: STUDENT IN AN ORGANIZED HEALTH CARE EDUCATION/TRAINING PROGRAM

## 2018-06-27 ENCOUNTER — HOSPITAL ENCOUNTER (OUTPATIENT)
Dept: SPEECH THERAPY | Facility: CLINIC | Age: 83
Setting detail: THERAPIES SERIES
End: 2018-06-27
Attending: PSYCHIATRY & NEUROLOGY
Payer: MEDICARE

## 2018-06-27 PROCEDURE — G9173 VOICE D/C STATUS: HCPCS | Mod: GN,CI | Performed by: STUDENT IN AN ORGANIZED HEALTH CARE EDUCATION/TRAINING PROGRAM

## 2018-06-27 PROCEDURE — G9171 VOICE CURRENT STATUS: HCPCS | Mod: GN,CI | Performed by: STUDENT IN AN ORGANIZED HEALTH CARE EDUCATION/TRAINING PROGRAM

## 2018-06-27 PROCEDURE — 92507 TX SP LANG VOICE COMM INDIV: CPT | Mod: GN | Performed by: STUDENT IN AN ORGANIZED HEALTH CARE EDUCATION/TRAINING PROGRAM

## 2018-06-27 PROCEDURE — 40000251 ZZH STATISTIC VOICE CENTER VISIT: Performed by: STUDENT IN AN ORGANIZED HEALTH CARE EDUCATION/TRAINING PROGRAM

## 2018-06-27 PROCEDURE — G9172 VOICE GOAL STATUS: HCPCS | Mod: GN,CI | Performed by: STUDENT IN AN ORGANIZED HEALTH CARE EDUCATION/TRAINING PROGRAM

## 2018-06-27 NOTE — PROGRESS NOTES
"Outpatient Speech Language Pathology Discharge Note     Patient: Leonard Garza  : 1935    Beginning/End Dates of Reporting Period:  2018 to 2018    Referring Provider: Dr. Vila    Therapy Diagnosis: Dysphonia, hypokinetic dysarthria    Client Self Report: Patient arrived on time and has no updates.  He reports continued practice of HEP and loud technique in his daily voice use.     Objective Measurements:      Pt completed the previously trained LSVT daily tasks when given no-min cues from SLP to use the loud technique.  Pt completed the maximum sustained /a/ x15 in a range of 20.2-30.0 seconds (average 25.4 seconds) and a loudness range of 76-90 dB (average 82 dB).  Pt completed the maximum fundamental frequency range (High/Low) tasks x15 each in a range of D2-C4.  Pt read aloud the 10 functional phrases x5 with a loudness range of 72-79 dB (average 77 dB).  SLP continued to train use of the loud technique in semi-spontaneous and spontaneous speech tasks today.  Given no-min cues from SLP to use the technique, pt read aloud newspaper articles with an average loudness of 76 dB, discussed the articles with an average 74 dB, and participated in conversation with SLP with an average 76 dB.  SLP provided education regarding the LSVT LOUD home maintenance program to be implemented upon discharge and answered pt's questions.           Goals:  Goal Identifier Generalization   Goal Description Patient will learn, demonstrate, and implement a \"loud\" compensatory speech intelligibility strategy in order to report a week of typical activities in which dysphonia does not exceed a level of 2 out of 10, 90% of the time so that patient is able to effectively communicate with friends/family in a variety of daily communication situations.   Target Date 18   Date Met  18   Progress:  Goal met per patient report.  In session, patient demonstrating intermittent mild dysphonia that improves with sips of " water.     Goal Identifier Breath   Goal Description Patient will increase vocal loudness to an average SPL of 80 dB at 30 cm during sustained /a/ phonation independently in order to increase vocal respiratory support required for functional communication.   Target Date 07/23/18   Date Met  06/27/18   Progress:  Goal met per objective measures above.     Goal Identifier Conversation   Goal Description Patient will increase vocal loudness during semi-spontaneous conversation tasks to a minimum range of 75-80 dB at 30 cm given min cues in order to improve vocal loudness for daily communication tasks.   Target Date 07/23/18   Date Met  06/27/18   Progress:  Goal met per objective measures above.       Progress Toward Goals:    Progress this reporting period: Good, all goals met.  Patient has demonstrated significant improvement in voice quality, volume, and speech intelligibility throughout the course of the LSVT LOUD program.  Patient is currently meeting all of his therapy goals, and has received comments from several of his friends and family that his voice quality and volume are improved.  Patient is motivated to continue a regular home exercise program of therapy exercises in order to maintain voice and speech improvements.    Plan:  Discharge from therapy.    Discharge:    Reason for Discharge: Patient has met all goals.      Discharge Plan: Patient to continue home program.      Allison Alpers, B.A. (khadra), M.A., CCC-SLP  Speech-Language Pathologist  Certificate of Vocology  [facility]  [phone]

## 2018-07-30 ENCOUNTER — DOCUMENTATION ONLY (OUTPATIENT)
Dept: LAB | Facility: CLINIC | Age: 83
End: 2018-07-30

## 2018-07-30 DIAGNOSIS — I10 ESSENTIAL HYPERTENSION, BENIGN: Primary | ICD-10-CM

## 2018-07-30 DIAGNOSIS — I10 ESSENTIAL HYPERTENSION, BENIGN: ICD-10-CM

## 2018-07-30 LAB
ALBUMIN SERPL-MCNC: 3.7 G/DL (ref 3.4–5)
ALBUMIN UR-MCNC: NEGATIVE MG/DL
ALP SERPL-CCNC: 102 U/L (ref 40–150)
ALT SERPL W P-5'-P-CCNC: 17 U/L (ref 0–70)
ANION GAP SERPL CALCULATED.3IONS-SCNC: 5 MMOL/L (ref 3–14)
APPEARANCE UR: CLEAR
AST SERPL W P-5'-P-CCNC: 14 U/L (ref 0–45)
BILIRUB SERPL-MCNC: 0.6 MG/DL (ref 0.2–1.3)
BILIRUB UR QL STRIP: NEGATIVE
BUN SERPL-MCNC: 23 MG/DL (ref 7–30)
CALCIUM SERPL-MCNC: 8.5 MG/DL (ref 8.5–10.1)
CHLORIDE SERPL-SCNC: 109 MMOL/L (ref 94–109)
CO2 SERPL-SCNC: 30 MMOL/L (ref 20–32)
COLOR UR AUTO: YELLOW
CREAT SERPL-MCNC: 1.38 MG/DL (ref 0.66–1.25)
GFR SERPL CREATININE-BSD FRML MDRD: 49 ML/MIN/1.7M2
GLUCOSE SERPL-MCNC: 94 MG/DL (ref 70–99)
GLUCOSE UR STRIP-MCNC: NEGATIVE MG/DL
HGB UR QL STRIP: NEGATIVE
KETONES UR STRIP-MCNC: NEGATIVE MG/DL
LEUKOCYTE ESTERASE UR QL STRIP: NEGATIVE
NITRATE UR QL: NEGATIVE
PH UR STRIP: 6 PH (ref 5–7)
POTASSIUM SERPL-SCNC: 4.5 MMOL/L (ref 3.4–5.3)
PROT SERPL-MCNC: 7 G/DL (ref 6.8–8.8)
SODIUM SERPL-SCNC: 144 MMOL/L (ref 133–144)
SOURCE: NORMAL
SP GR UR STRIP: 1.02 (ref 1–1.03)
UROBILINOGEN UR STRIP-ACNC: 0.2 EU/DL (ref 0.2–1)

## 2018-07-30 PROCEDURE — 80053 COMPREHEN METABOLIC PANEL: CPT | Performed by: INTERNAL MEDICINE

## 2018-07-30 PROCEDURE — 36415 COLL VENOUS BLD VENIPUNCTURE: CPT | Performed by: INTERNAL MEDICINE

## 2018-07-30 PROCEDURE — 81003 URINALYSIS AUTO W/O SCOPE: CPT | Performed by: INTERNAL MEDICINE

## 2018-08-02 ENCOUNTER — OFFICE VISIT (OUTPATIENT)
Dept: INTERNAL MEDICINE | Facility: CLINIC | Age: 83
End: 2018-08-02
Payer: MEDICARE

## 2018-08-02 VITALS
TEMPERATURE: 98.2 F | WEIGHT: 205 LBS | SYSTOLIC BLOOD PRESSURE: 116 MMHG | HEIGHT: 69 IN | RESPIRATION RATE: 15 BRPM | DIASTOLIC BLOOD PRESSURE: 70 MMHG | HEART RATE: 64 BPM | BODY MASS INDEX: 30.36 KG/M2

## 2018-08-02 DIAGNOSIS — Z13.6 CARDIOVASCULAR SCREENING; LDL GOAL LESS THAN 100: ICD-10-CM

## 2018-08-02 DIAGNOSIS — Z12.11 COLON CANCER SCREENING: ICD-10-CM

## 2018-08-02 DIAGNOSIS — Z00.00 MEDICARE ANNUAL WELLNESS VISIT, SUBSEQUENT: Primary | ICD-10-CM

## 2018-08-02 DIAGNOSIS — Z12.5 SPECIAL SCREENING FOR MALIGNANT NEOPLASM OF PROSTATE: ICD-10-CM

## 2018-08-02 DIAGNOSIS — G20.C PARKINSONISM, UNSPECIFIED PARKINSONISM TYPE (H): ICD-10-CM

## 2018-08-02 DIAGNOSIS — I10 ESSENTIAL HYPERTENSION, BENIGN: ICD-10-CM

## 2018-08-02 DIAGNOSIS — I73.9 PERIPHERAL VASCULAR DISEASE (H): ICD-10-CM

## 2018-08-02 LAB
CHOLEST SERPL-MCNC: 134 MG/DL
HDLC SERPL-MCNC: 64 MG/DL
HGB BLD-MCNC: 14.1 G/DL (ref 13.3–17.7)
LDLC SERPL CALC-MCNC: 55 MG/DL
NONHDLC SERPL-MCNC: 70 MG/DL
PSA SERPL-ACNC: 1.9 UG/L (ref 0–4)
TRIGL SERPL-MCNC: 75 MG/DL

## 2018-08-02 PROCEDURE — G0439 PPPS, SUBSEQ VISIT: HCPCS | Performed by: INTERNAL MEDICINE

## 2018-08-02 PROCEDURE — 80061 LIPID PANEL: CPT | Performed by: INTERNAL MEDICINE

## 2018-08-02 PROCEDURE — G0103 PSA SCREENING: HCPCS | Performed by: INTERNAL MEDICINE

## 2018-08-02 PROCEDURE — 85018 HEMOGLOBIN: CPT | Performed by: INTERNAL MEDICINE

## 2018-08-02 PROCEDURE — 36415 COLL VENOUS BLD VENIPUNCTURE: CPT | Performed by: INTERNAL MEDICINE

## 2018-08-02 NOTE — LETTER
[facility]  [address] [address]  [phone]      8/2/2018       Leonard Garza  [address] [place] [id number]        Dear Héctor,    Your hemoglobin is normal.    Your cholesterol looks good and I would not change anything at this point but would repeat your labs in 12 months.    In addition, your PSA or prostate screening antigen is stable and should be repeated annually.    Sincerely,      Gian Fuller MD  Internal Medicine

## 2018-08-02 NOTE — PROGRESS NOTES
SUBJECTIVE:   Leonard Garza is a 83 year old male who presents for Preventive Visit.  Are you in the first 12 months of your Medicare Part B coverage?  No    Healthy Habits:    Do you get at least three servings of calcium containing foods daily (dairy, green leafy vegetables, etc.)? yes    Amount of exercise or daily activities, outside of work: 2-3 day(s) per week- seeing  once weekly     Problems taking medications regularly No    Medication side effects: No    Have you had an eye exam in the past two years? yes    Do you see a dentist twice per year? yes    Do you have sleep apnea, excessive snoring or daytime drowsiness?no      Ability to successfully perform activities of daily living: Yes, no assistance needed    Home safety:  none identified     Hearing impairment: No    Fall risk:  Fall Risk Assessment not completed.      PROBLEMS TO ADD ON...  1. Being evaluated for Parkinsons by neurology     Reviewed and updated as needed this visit by clinical staff       Reviewed and updated as needed this visit by Provider        Social History   Substance Use Topics     Smoking status: Former Smoker     Smokeless tobacco: Not on file      Comment: quit 40 yrs ago     Alcohol use No      Comment: social       If you drink alcohol do you typically have >3 drinks per day or >7 drinks per week? No                        Today's PHQ-2 Score:   PHQ-2 ( 1999 Pf[employer]) 11/8/2016 11/8/2016   Q1: Little interest or pleasure in doing things 0 0   Q2: Feeling down, depressed or hopeless 0 0   PHQ-2 Score 0 0       Do you feel safe in your environment - Yes    Do you have a Health Care Directive?: Yes: Advance Directive has been received and scanned.    Current providers sharing in care for this patient include:   Patient Care Team:  Gian Fuller MD as PCP - General    The following health maintenance items are reviewed in Epic and correct as of today:  Health Maintenance   Topic Date Due     ADVANCE  "DIRECTIVE PLANNING Q5 YRS  02/23/1992     COLONOSCOPY Q10 YR  12/26/2013     FALL RISK ASSESSMENT  11/08/2017     PHQ-2 Q1 YR  11/08/2017     INFLUENZA VACCINE (1) 09/01/2018     TETANUS IMMUNIZATION (SYSTEM ASSIGNED)  10/02/2019     PNEUMOCOCCAL  Completed         Immunization History   Administered Date(s) Administered     HEPA 10/02/2009     HepB 10/02/2009     Influenza (High Dose) 3 valent vaccine 11/05/2015, 11/28/2016, 12/18/2017     Influenza (IIV3) PF 10/01/2013     Pneumo Conj 13-V (2010&after) 11/05/2015     Pneumococcal 23 valent 05/12/1999, 12/29/2004     TD (ADULT, 7+) 10/02/2009     Tetanus 05/01/2004     Typhoid Oral 10/02/2009       ROS:  CONSTITUTIONAL: NEGATIVE for fever, chills, change in weight  ENT/MOUTH: NEGATIVE for ear, mouth and throat problems  RESP: NEGATIVE for significant cough or SOB  CV: NEGATIVE for chest pain, palpitations or peripheral edema  GI: NEGATIVE for nausea, abdominal pain, heartburn, or change in bowel habits  : NEGATIVE for frequency, dysuria, or hematuria  MUSCULOSKELETAL: NEGATIVE for significant arthralgias or myalgia  NEURO: NEGATIVE for weakness, dizziness or paresthesias  HEME: NEGATIVE for bleeding problems  PSYCHIATRIC: NEGATIVE for changes in mood or affect    OBJECTIVE:   /70  Pulse 64  Temp 98.2  F (36.8  C) (Oral)  Resp 15  Ht 5' 8.5\" (1.74 m)  Wt 205 lb (93 kg)  BMI 30.72 kg/m2 Estimated body mass index is 31.75 kg/(m^2) as calculated from the following:    Height as of 11/14/16: 5' 9\" (1.753 m).    Weight as of 11/14/16: 215 lb (97.5 kg).  EXAM:   GENERAL: alert and no distress  EYES: Eyes grossly normal to inspection, PERRL and conjunctivae and sclerae normal  HENT: ear canals and TM's normal, nose and mouth without ulcers or lesions  NECK: no adenopathy, no asymmetry, masses, or scars and thyroid normal to palpation  RESP: lungs clear to auscultation - no rales, rhonchi or wheezes  CV: regular rate and rhythm, normal S1 S2, no S3 or S4, no " "murmur, click or rub, no peripheral edema and peripheral pulses strong  ABDOMEN: soft, nontender, no hepatosplenomegaly, no masses and bowel sounds normal  RECTAL: normal sphincter tone, no rectal masses, prostate normal size, smooth, nontender without mass.  MS: no gross musculoskeletal defects noted, no edema  NEURO: Normal strength and tone, mentation intact and speech normal, slight fasciculation right thenar prominence hand  PSYCH: mentation appears normal, affect normal/bright    ASSESSMENT / PLAN:   1. Medicare annual wellness visit, subsequent  Advised and discussed shingles vaccination as of benefit  - Hemoglobin    2. Peripheral vascular disease (H)  Stable without current complaints or symptoms continue with medical managed    3. Parkinsonism, unspecified Parkinsonism type (H)  Followed by neurology per Dr. Vila and has more parkinsonian features rather than traditional parkinsonian disease.  He was treated empirically but did not tolerate meds thus is undergoing no therapy at present    4. Essential hypertension, benign  Stable at goal on therapy continue with medical management    5. CARDIOVASCULAR SCREENING; LDL GOAL LESS THAN 100  Labs ordered as fasting per routine  - Lipid Profile    6. Special screening for malignant neoplasm of prostate  Ordered as routine per PSA  - PSA, screen    7. Colon cancer screening  We will obtain for testing  - Fecal colorectal cancer screen (FIT); Future    End of Life Planning:  Patient currently has an advanced directive: Yes.  Practitioner is supportive of decision.    COUNSELING:  Reviewed preventive health counseling, as reflected in patient instructions       Regular exercise       Healthy diet/nutrition    BP Readings from Last 1 Encounters:   11/16/16 110/64     Estimated body mass index is 31.75 kg/(m^2) as calculated from the following:    Height as of 11/14/16: 5' 9\" (1.753 m).    Weight as of 11/14/16: 215 lb (97.5 kg).       reports that he has quit " smoking. He does not have any smokeless tobacco history on file.    Appropriate preventive services were discussed with this patient, including applicable screening as appropriate for cardiovascular disease, diabetes, osteopenia/osteoporosis, and glaucoma.  As appropriate for age/gender, discussed screening for colorectal cancer, prostate cancer, breast cancer, and cervical cancer. Checklist reviewing preventive services available has been given to the patient.    Reviewed patients plan of care and provided an AVS. The Basic Care Plan (routine screening as documented in Health Maintenance) for Leonard meets the Care Plan requirement. This Care Plan has been established and reviewed with the Patient.    Counseling Resources:  ATP IV Guidelines  Pooled Cohorts Equation Calculator  Breast Cancer Risk Calculator  FRAX Risk Assessment  ICSI Preventive Guidelines  Dietary Guidelines for Americans, 2010  [employer]'s MyPlate  ASA Prophylaxis  Lung CA Screening    Gian Fuller MD  [facility]    THE MEDICATION LIST HAS BEEN FULLY RECONCILED BY THE M.D. AND THE NURSING STAFF.

## 2018-08-02 NOTE — MR AVS SNAPSHOT
After Visit Summary   8/2/2018    DR Valle NUZHAT Garza    MRN: [id number]           Patient Information     Date Of Birth          1935        Visit Information        Provider Department      8/2/2018 8:40 AM Gian Fuller MD [facility]        Today's Diagnoses     Medicare annual wellness visit, subsequent    -  1    Peripheral vascular disease (H)        Parkinsonism, unspecified Parkinsonism type (H)        Essential hypertension, benign        CARDIOVASCULAR SCREENING; LDL GOAL LESS THAN 100        Special screening for malignant neoplasm of prostate        Colon cancer screening          Care Instructions      Preventive Health Recommendations:       Male Ages 65 and over    Yearly exam:             See your health care provider every year in order to  o   Review health changes.   o   Discuss preventive care.    o   Review your medicines if your doctor has prescribed any.    Talk with your health care provider about whether you should have a test to screen for prostate cancer (PSA).    Every 3 years, have a diabetes test (fasting glucose). If you are at risk for diabetes, you should have this test more often.    Every 5 years, have a cholesterol test. Have this test more often if you are at risk for high cholesterol or heart disease.     Every 10 years, have a colonoscopy. Or, have a yearly FIT test (stool test). These exams will check for colon cancer.    Talk to with your health care provider about screening for Abdominal Aortic Aneurysm if you have a family history of AAA or have a history of smoking.  Shots:     Get a flu shot each year.     Get a tetanus shot every 10 years.     Talk to your doctor about your pneumonia vaccines. There are now two you should receive - Pneumovax (PPSV 23) and Prevnar (PCV 13).    Talk to your pharmacist about a shingles vaccine.     Talk to your doctor about the hepatitis B vaccine.  Nutrition:     Eat at least 5 servings of  fruits and vegetables each day.     Eat whole-grain bread, whole-wheat pasta and brown rice instead of white grains and rice.     Get adequate Calcium and Vitamin D.   Lifestyle    Exercise for at least 150 minutes a week (30 minutes a day, 5 days a week). This will help you control your weight and prevent disease.     Limit alcohol to one drink per day.     No smoking.     Wear sunscreen to prevent skin cancer.     See your dentist every six months for an exam and cleaning.     See your eye doctor every 1 to 2 years to screen for conditions such as glaucoma, macular degeneration and cataracts.          Follow-ups after your visit        Follow-up notes from your care team     Return if symptoms worsen or fail to improve.      Future tests that were ordered for you today     Open Future Orders        Priority Expected Expires Ordered    Fecal colorectal cancer screen (FIT) Routine 8/23/2018 10/25/2018 8/2/2018            Who to contact     If you have questions or need follow up information about today's clinic visit or your schedule please contact [facility] directly at [phone].  Normal or non-critical lab and imaging results will be communicated to you by MyChart, letter or phone within 4 business days after the clinic has received the results. If you do not hear from us within 7 days, please contact the clinic through MyChart or phone. If you have a critical or abnormal lab result, we will notify you by phone as soon as possible.  Submit refill requests through [employer] or call your pharmacy and they will forward the refill request to us. Please allow 3 business days for your refill to be completed.          Additional Information About Your Visit        Care EveryWhere ID     This is your Care EveryWhere ID. This could be used by other organizations to access your [place] medical records  [id number]        Your Vitals Were     Pulse Temperature Respirations Height BMI (Body Mass  "Index)       64 98.2  F (36.8  C) (Oral) 15 5' 8.5\" (1.74 m) 30.72 kg/m2        Blood Pressure from Last 3 Encounters:   08/02/18 116/70   11/16/16 110/64   11/08/16 122/70    Weight from Last 3 Encounters:   08/02/18 205 lb (93 kg)   11/14/16 215 lb (97.5 kg)   11/08/16 215 lb (97.5 kg)              We Performed the Following     Hemoglobin     Lipid Profile     PSA, screen        Primary Care Provider Office Phone # Fax #    Gian Fuller [phone]       [address] [facility] [address]        Equal Access to Services     SOPHIA MCDONOUGH : Hadii natan hookso Soshady, waaxda luqadaha, qaybta kaalmada adeegyada, alfonzo hair . So [facility] [phone].    ATENCIÓN: Si habla español, tiene a olvera disposición servicios gratuitos de asistencia lingüística. [facility] [phone].    We comply with applicable federal civil rights laws and [place] laws. We do not discriminate on the basis of race, color, national origin, age, disability, sex, sexual orientation, or gender identity.            Thank you!     Thank you for choosing [facility]  for your care. Our goal is always to provide you with excellent care. Hearing back from our patients is one way we can continue to improve our services. Please take a few minutes to complete the written survey that you may receive in the mail after your visit with us. Thank you!             Your Updated Medication List - Protect others around you: Learn how to safely use, store and throw away your medicines at www.disposemymeds.org.          This list is accurate as of 8/2/18  9:03 AM.  Always use your most recent med list.                   Brand Name Dispense Instructions for use Diagnosis    atorvastatin 20 MG tablet    LIPITOR    90 tablet    Take 1 tablet (20 mg) by mouth daily    CARDIOVASCULAR SCREENING; LDL GOAL LESS THAN 100       calcium-vitamin D 600-400 MG-UNIT per tablet    CALTRATE     Take 1 " tablet by mouth daily        irbesartan 150 MG tablet    AVAPRO    90 tablet    Take 1 tablet (150 mg) by mouth daily    Essential hypertension, benign

## 2018-10-16 ENCOUNTER — TRANSFERRED RECORDS (OUTPATIENT)
Dept: HEALTH INFORMATION MANAGEMENT | Facility: CLINIC | Age: 83
End: 2018-10-16

## 2019-02-25 DIAGNOSIS — I10 ESSENTIAL HYPERTENSION, BENIGN: ICD-10-CM

## 2019-02-25 DIAGNOSIS — Z13.6 CARDIOVASCULAR SCREENING; LDL GOAL LESS THAN 100: ICD-10-CM

## 2019-02-25 RX ORDER — ATORVASTATIN CALCIUM 20 MG/1
TABLET, FILM COATED ORAL
Qty: 90 TABLET | Refills: 3 | Status: SHIPPED | OUTPATIENT
Start: 2019-02-25 | End: 2020-08-11

## 2019-02-25 RX ORDER — IRBESARTAN 150 MG/1
TABLET ORAL
Qty: 90 TABLET | Refills: 3 | Status: SHIPPED | OUTPATIENT
Start: 2019-02-25 | End: 2020-08-11 | Stop reason: ALTCHOICE

## 2019-02-25 NOTE — TELEPHONE ENCOUNTER
"Requested Prescriptions   Pending Prescriptions Disp Refills     atorvastatin (LIPITOR) 20 MG tablet [Pharmacy Med Name: ATORVASTATIN 20 MG TABLET]  Last Written Prescription Date:  02/12/2018  Last Fill Quantity: 90,  # refills: 03   Last Office Visit: 8/2/2018   Future Office Visit:      90 tablet 0     Sig: TAKE 1 TABLET BY MOUTH EVERY DAY    Statins Protocol Passed - 2/25/2019  9:06 AM       Passed - LDL on file in past 12 months    Recent Labs   Lab Test 08/02/18  0911   LDL 55            Passed - No abnormal creatine kinase in past 12 months    No lab results found.            Passed - Recent (12 mo) or future (30 days) visit within the authorizing provider's specialty    Patient had office visit in the last 12 months or has a visit in the next 30 days with authorizing provider or within the authorizing provider's specialty.  See \"Patient Info\" tab in inbasket, or \"Choose Columns\" in Meds & Orders section of the refill encounter.             Passed - Medication is active on med list       Passed - Patient is age 18 or older        irbesartan (AVAPRO) 150 MG tablet [Pharmacy Med Name: IRBESARTAN 150 MG TABLET]  Last Written Prescription Date:  02/12/2018  Last Fill Quantity: 90,  # refills: 03   Last Office Visit: 8/2/2018   Future Office Visit:      90 tablet 0     Sig: TAKE 1 TABLET BY MOUTH EVERY DAY    Angiotensin-II Receptors Failed - 2/25/2019  9:06 AM       Failed - Normal serum creatinine on file in past 12 months    Recent Labs   Lab Test 07/30/18  0931   CR 1.38*            Passed - Blood pressure under 140/90 in past 12 months    BP Readings from Last 3 Encounters:   08/02/18 116/70   11/16/16 110/64   11/08/16 122/70                Passed - Recent (12 mo) or future (30 days) visit within the authorizing provider's specialty    Patient had office visit in the last 12 months or has a visit in the next 30 days with authorizing provider or within the authorizing provider's specialty.  See \"Patient Info\" " "tab in inbasket, or \"Choose Columns\" in Meds & Orders section of the refill encounter.             Passed - Medication is active on med list       Passed - Patient is age 18 or older       Passed - Normal serum potassium on file in past 12 months    Recent Labs   Lab Test 07/30/18  0931   POTASSIUM 4.5                      "

## 2019-05-06 ENCOUNTER — TRANSFERRED RECORDS (OUTPATIENT)
Dept: HEALTH INFORMATION MANAGEMENT | Facility: CLINIC | Age: 84
End: 2019-05-06

## 2019-05-11 ENCOUNTER — ANCILLARY PROCEDURE (OUTPATIENT)
Dept: GENERAL RADIOLOGY | Facility: CLINIC | Age: 84
End: 2019-05-11
Attending: PHYSICIAN ASSISTANT
Payer: MEDICARE

## 2019-05-11 ENCOUNTER — OFFICE VISIT (OUTPATIENT)
Dept: URGENT CARE | Facility: URGENT CARE | Age: 84
End: 2019-05-11
Payer: MEDICARE

## 2019-05-11 VITALS
RESPIRATION RATE: 22 BRPM | BODY MASS INDEX: 30.01 KG/M2 | WEIGHT: 200.25 LBS | OXYGEN SATURATION: 96 % | SYSTOLIC BLOOD PRESSURE: 104 MMHG | HEART RATE: 84 BPM | TEMPERATURE: 98 F | DIASTOLIC BLOOD PRESSURE: 66 MMHG

## 2019-05-11 DIAGNOSIS — R05.8 PRODUCTIVE COUGH: ICD-10-CM

## 2019-05-11 DIAGNOSIS — R05.9 COUGH: ICD-10-CM

## 2019-05-11 DIAGNOSIS — R09.89 CHEST CONGESTION: ICD-10-CM

## 2019-05-11 DIAGNOSIS — J34.89 NASAL DRAINAGE: ICD-10-CM

## 2019-05-11 DIAGNOSIS — R05.8 PRODUCTIVE COUGH: Primary | ICD-10-CM

## 2019-05-11 PROCEDURE — 99214 OFFICE O/P EST MOD 30 MIN: CPT | Performed by: PHYSICIAN ASSISTANT

## 2019-05-11 PROCEDURE — 71046 X-RAY EXAM CHEST 2 VIEWS: CPT

## 2019-05-11 RX ORDER — FLUTICASONE PROPIONATE 50 MCG
1-2 SPRAY, SUSPENSION (ML) NASAL DAILY
Qty: 16 G | Refills: 0 | Status: SHIPPED | OUTPATIENT
Start: 2019-05-11 | End: 2019-05-21

## 2019-05-11 RX ORDER — AZITHROMYCIN 250 MG/1
TABLET, FILM COATED ORAL
Qty: 6 TABLET | Refills: 0 | Status: SHIPPED | OUTPATIENT
Start: 2019-05-11 | End: 2019-05-21

## 2019-05-11 RX ORDER — CODEINE PHOSPHATE AND GUAIFENESIN 10; 100 MG/5ML; MG/5ML
5-10 SOLUTION ORAL
Qty: 120 ML | Refills: 0 | Status: SHIPPED | OUTPATIENT
Start: 2019-05-11 | End: 2019-05-21

## 2019-05-11 RX ORDER — ASPIRIN 81 MG/1
81 TABLET, CHEWABLE ORAL
COMMUNITY

## 2019-05-11 NOTE — PROGRESS NOTES
SUBJECTIVE:   Leonard Garza is a 84 year old male presenting with a chief complaint of chest congestion, productive cough and nasal drainage.  Onset of symptoms was 5 day(s) ago.  Course of illness is worsening.    Severity moderate  Current and Associated symptoms: stuffy nose, cough - productive and facial pain/pressure  Treatment measures tried include OTC Cough med.  Predisposing factors include recent illness.    Past Medical History:   Diagnosis Date     Basal cell cancer 10/6/2010     Basal cell cancer 10/6/2010     CKD (chronic kidney disease) stage 3, GFR 30-59 ml/min (H) 6/13/2011     Hypertension      Inguinal hernia 10/6/2010     PERIPH VASCULAR DIS NOS 8/17/2006     Unspecified cerebral artery occlusion with cerebral infarction         Allergies   Allergen Reactions     Hay Fever & [A.R.M.]      Ragweeds      Family History   Problem Relation Age of Onset     Neurologic Disorder Brother         PARKINSONISM AGE 73     Hypertension Mother          Social History     Tobacco Use     Smoking status: Former Smoker     Smokeless tobacco: Never Used     Tobacco comment: quit 40 yrs ago   Substance Use Topics     Alcohol use: No     Comment: social       ROS:  CONSTITUTIONAL:NEGATIVE for fever, chills, change in weight  INTEGUMENTARY/SKIN: NEGATIVE for worrisome rashes, moles or lesions  EYES: NEGATIVE for vision changes or irritation  ENT/MOUTH: POSITIVE for nasal congestion, drainage  RESP:POSITIVE for cough-productive  CV: NEGATIVE for chest pain, palpitations or peripheral edema  GI: NEGATIVE for nausea, abdominal pain, heartburn, or change in bowel habits  : negative for and dysuria  MUSCULOSKELETAL: NEGATIVE for significant arthralgias or myalgia  NEURO: NEGATIVE for weakness, dizziness or paresthesias    OBJECTIVE  :/66 (Cuff Size: Adult Regular)   Pulse 84   Temp 98  F (36.7  C)   Resp 22   Wt 90.8 kg (200 lb 4 oz)   SpO2 96%   BMI 30.01 kg/m    GENERAL APPEARANCE: healthy, alert and  no distress  EYES: EOMI,  PERRL, conjunctiva clear  HENT: TM's normal bilaterally and rhinorrhea clear  NECK: supple, nontender, no lymphadenopathy  RESP: Positive for mild congestion with coughing  CV: regular rates and rhythm, normal S1 S2, no murmur noted  ABDOMEN:  soft, nontender, no HSM or masses and bowel sounds normal  NEURO: Normal strength and tone, sensory exam grossly normal,  normal speech and mentation  SKIN: no suspicious lesions or rashes    Chest xray Negative for acute findings, positive for stable atelectasis read by Marcus Burns PA-C MMS at time of visit.    ASSESSMENT/PLAN      ICD-10-CM    1. Productive cough R05 azithromycin (ZITHROMAX) 250 MG tablet     XR Chest 2 Views   2. Chest congestion R09.89 azithromycin (ZITHROMAX) 250 MG tablet     XR Chest 2 Views   3. Cough R05 guaiFENesin-codeine (ROBITUSSIN AC) 100-10 MG/5ML solution   4. Nasal drainage J34.89 fluticasone (FLONASE) 50 MCG/ACT nasal spray       Orders Placed This Encounter     XR Chest 2 Views     aspirin (ASPIRIN 81) 81 MG chewable tablet     guaiFENesin-codeine (ROBITUSSIN AC) 100-10 MG/5ML solution     azithromycin (ZITHROMAX) 250 MG tablet     fluticasone (FLONASE) 50 MCG/ACT nasal spray       Fluids, rest  Follow up with PCP as needed  Return to clinic is symptoms worsen

## 2019-05-17 ENCOUNTER — OFFICE VISIT (OUTPATIENT)
Dept: URGENT CARE | Facility: URGENT CARE | Age: 84
End: 2019-05-17
Payer: MEDICARE

## 2019-05-17 ENCOUNTER — TELEPHONE (OUTPATIENT)
Dept: URGENT CARE | Facility: URGENT CARE | Age: 84
End: 2019-05-17

## 2019-05-17 VITALS
RESPIRATION RATE: 16 BRPM | HEART RATE: 71 BPM | BODY MASS INDEX: 29.82 KG/M2 | DIASTOLIC BLOOD PRESSURE: 82 MMHG | OXYGEN SATURATION: 99 % | SYSTOLIC BLOOD PRESSURE: 136 MMHG | WEIGHT: 199 LBS | TEMPERATURE: 98 F

## 2019-05-17 DIAGNOSIS — K92.1 BLACK STOOLS: Primary | ICD-10-CM

## 2019-05-17 LAB — HGB BLD-MCNC: 14.4 G/DL (ref 13.3–17.7)

## 2019-05-17 PROCEDURE — 36415 COLL VENOUS BLD VENIPUNCTURE: CPT | Performed by: FAMILY MEDICINE

## 2019-05-17 PROCEDURE — 99213 OFFICE O/P EST LOW 20 MIN: CPT | Performed by: FAMILY MEDICINE

## 2019-05-17 PROCEDURE — 85018 HEMOGLOBIN: CPT | Performed by: FAMILY MEDICINE

## 2019-05-17 NOTE — TELEPHONE ENCOUNTER
Per verbal from Dr. Paz:  Please notify pt that HGB is 14.4 within normal range and that Dr. Paz thinks that the black stools is from the black olives that was eaten.

## 2019-05-17 NOTE — PROGRESS NOTES
SUBJECTIVE:   Leonard Garza is a 84 year old male who presents to clinic today for the following health issues:  He is concerned because he had black stools he did have a salad that was loaded down with black olives and is wondering if this could be the case.. He does not look pale he does not feel sick.      HPI    Additional history: as documented    Reviewed and updated as needed this visit by clinical staff  Tobacco  Allergies  Meds         Reviewed and updated as needed this visit by Provider             Patient Active Problem List   Diagnosis     Headache     Hemorrhage of gastrointestinal tract     Essential hypertension, benign     Peripheral vascular disease (H)     Malignant basal cell neoplasm of skin     Inguinal hernia     CARDIOVASCULAR SCREENING; LDL GOAL LESS THAN 100     Health Care Home     Gastroesophageal reflux disease without esophagitis     PVC's (premature ventricular contractions)     BPH (benign prostatic hyperplasia)     Past Surgical History:   Procedure Laterality Date     CYSTOSCOPY, LITHOLAPAXY, COMBINED N/A 11/14/2016    Procedure: COMBINED CYSTOSCOPY, LITHOLAPAXY;  Surgeon: Ceasar Silverio MD;  Location:  OR     CYSTOSCOPY, TRANSURETHRAL RESECTION (TUR) PROSTATE, COMBINED N/A 11/14/2016    Procedure: COMBINED CYSTOSCOPY, TRANSURETHRAL RESECTION (TUR) PROSTATE;  Surgeon: Ceasar Silverio MD;  Location:  OR     ENT SURGERY      nasal septoplasty for fx x3     GENITOURINARY SURGERY      remove testicle     HERNIORRHAPHY INGUINAL  12/11/2012    Procedure: HERNIORRHAPHY INGUINAL;  Right Inguinal Hernia Repair with Mesh, Right Hydrocelectomy;  Surgeon: Miles Redd MD;  Location: [facility] OR     HYDROCELECTOMY INGUINAL  12/11/2012    Procedure: HYDROCELECTOMY INGUINAL;;  Surgeon: Ceasar Silverio MD;  Location:  OR     ORTHOPEDIC SURGERY      shoulder reduction for dislocation x3     VASCULAR SURGERY      carotid endarderectomy       Social History     Tobacco Use      Smoking status: Former Smoker     Smokeless tobacco: Never Used     Tobacco comment: quit 40 yrs ago   Substance Use Topics     Alcohol use: No     Comment: social     Family History   Problem Relation Age of Onset     Neurologic Disorder Brother         PARKINSONISM AGE 73     Hypertension Mother            ROS:  Constitutional, HEENT, cardiovascular, pulmonary, gi and gu systems are negative, except as otherwise noted.    OBJECTIVE:     /82   Pulse 71   Temp 98  F (36.7  C) (Oral)   Resp 16   Wt 90.3 kg (199 lb)   SpO2 99%   BMI 29.82 kg/m    Body mass index is 29.82 kg/m .  GENERAL: alert and moderate distress  NECK: no adenopathy, no asymmetry, masses, or scars and thyroid normal to palpation  RESP: lungs clear to auscultation - no rales, rhonchi or wheezes  CV: regular rate and rhythm, normal S1 S2, no S3 or S4, no murmur, click or rub, no peripheral edema and peripheral pulses strong  ABDOMEN: soft, nontender, no hepatosplenomegaly, no masses and bowel sounds normal  MS: no gross musculoskeletal defects noted, no edema  NEURO: Normal strength and tone, mentation intact and speech normal    Diagnostic Test Results:  Hemoglobin   Date Value Ref Range Status   05/17/2019 14.4 13.3 - 17.7 g/dL Final   ]      ASSESSMENT/PLAN:               ICD-10-CM    1. Black stools K92.1 Hemoglobin     CANCELED: **Hemoglobin FUTURE anytime       I suspect that it was the black olives that he ate.  The coloring probably did end up in his stool and his hemoglobin is 14.4, in addition his blood pressure look good    So there is no evidence that he has any dizziness I do not think that he is volume depleted and I do not believe that he is lost a lot of blood.    Having said that he needs to follow-up with his primary care and they can discuss any future procedures or labs especially if he continues to have the discoloration of the stools    Tesfaye Paz MD  [facility]

## 2019-05-21 ENCOUNTER — OFFICE VISIT (OUTPATIENT)
Dept: INTERNAL MEDICINE | Facility: CLINIC | Age: 84
End: 2019-05-21
Payer: MEDICARE

## 2019-05-21 VITALS
OXYGEN SATURATION: 99 % | RESPIRATION RATE: 13 BRPM | HEIGHT: 68 IN | SYSTOLIC BLOOD PRESSURE: 134 MMHG | WEIGHT: 202.7 LBS | TEMPERATURE: 97.6 F | HEART RATE: 65 BPM | BODY MASS INDEX: 30.72 KG/M2 | DIASTOLIC BLOOD PRESSURE: 84 MMHG

## 2019-05-21 DIAGNOSIS — R19.5 DARK STOOLS: Primary | ICD-10-CM

## 2019-05-21 DIAGNOSIS — J06.9 UPPER RESPIRATORY TRACT INFECTION, UNSPECIFIED TYPE: ICD-10-CM

## 2019-05-21 DIAGNOSIS — I10 ESSENTIAL HYPERTENSION, BENIGN: ICD-10-CM

## 2019-05-21 DIAGNOSIS — Z12.11 COLON CANCER SCREENING: ICD-10-CM

## 2019-05-21 LAB
ANION GAP SERPL CALCULATED.3IONS-SCNC: 4 MMOL/L (ref 3–14)
BUN SERPL-MCNC: 21 MG/DL (ref 7–30)
CALCIUM SERPL-MCNC: 8.6 MG/DL (ref 8.5–10.1)
CHLORIDE SERPL-SCNC: 112 MMOL/L (ref 94–109)
CO2 SERPL-SCNC: 28 MMOL/L (ref 20–32)
CREAT SERPL-MCNC: 1.36 MG/DL (ref 0.66–1.25)
GFR SERPL CREATININE-BSD FRML MDRD: 48 ML/MIN/{1.73_M2}
GLUCOSE SERPL-MCNC: 78 MG/DL (ref 70–99)
HGB BLD-MCNC: 13.4 G/DL (ref 13.3–17.7)
POTASSIUM SERPL-SCNC: 4.2 MMOL/L (ref 3.4–5.3)
SODIUM SERPL-SCNC: 144 MMOL/L (ref 133–144)

## 2019-05-21 PROCEDURE — 85018 HEMOGLOBIN: CPT | Performed by: INTERNAL MEDICINE

## 2019-05-21 PROCEDURE — 36415 COLL VENOUS BLD VENIPUNCTURE: CPT | Performed by: INTERNAL MEDICINE

## 2019-05-21 PROCEDURE — 80048 BASIC METABOLIC PNL TOTAL CA: CPT | Performed by: INTERNAL MEDICINE

## 2019-05-21 PROCEDURE — 99214 OFFICE O/P EST MOD 30 MIN: CPT | Performed by: INTERNAL MEDICINE

## 2019-05-21 ASSESSMENT — MIFFLIN-ST. JEOR: SCORE: 1593.94

## 2019-05-21 NOTE — PROGRESS NOTES
Subjective     Leonard Garza is a 84 year old male who presents to clinic today for the following health issues:    Patient reports that he has recently undergone a upper respiratory viral infection that has been going on for about 2 to 3 weeks for which she has been treated and now is slowly improving.    He is concerned about some dark stools he had for 2 or 3 days and informs me that during the time he was being evaluated for his dark stools he came in to have a hemoglobin checked and it was found to be normal.  Upon review his hemoglobin was greater than 14 which appears to be about his baseline and the patient also informs me that during the timeframe that he was undergoing his treatment for his viral syndrome he had some bad heartburn so he was taking a fair amount of Pepto-Bismol.  It is after this Pepto-Bismol that he had the dark stools.  He has no other abdominal pain or associated symptoms.    HPI   ED/UC Followup:    Facility:  [facility]   Date of visit: 5/11/19 & 5/17/19  Reason for visit: Black stool, productive cough   Current Status: 1 episode of black stool since being seen. Cough still present, but improving     Patient Active Problem List   Diagnosis     Headache     Hemorrhage of gastrointestinal tract     Essential hypertension, benign     Peripheral vascular disease (H)     Malignant basal cell neoplasm of skin     Inguinal hernia     CARDIOVASCULAR SCREENING; LDL GOAL LESS THAN 100     Health Care Home     Gastroesophageal reflux disease without esophagitis     PVC's (premature ventricular contractions)     BPH (benign prostatic hyperplasia)     Past Surgical History:   Procedure Laterality Date     CYSTOSCOPY, LITHOLAPAXY, COMBINED N/A 11/14/2016    Procedure: COMBINED CYSTOSCOPY, LITHOLAPAXY;  Surgeon: Ceasar Silverio MD;  Location:  OR     CYSTOSCOPY, TRANSURETHRAL RESECTION (TUR) PROSTATE, COMBINED N/A 11/14/2016    Procedure: COMBINED CYSTOSCOPY, TRANSURETHRAL RESECTION (TUR)  PROSTATE;  Surgeon: Ceasar Silverio MD;  Location: [facility] OR     ENT SURGERY      nasal septoplasty for fx x3     GENITOURINARY SURGERY      remove testicle     HERNIORRHAPHY INGUINAL  12/11/2012    Procedure: HERNIORRHAPHY INGUINAL;  Right Inguinal Hernia Repair with Mesh, Right Hydrocelectomy;  Surgeon: Miles Redd MD;  Location: [facility] OR     HYDROCELECTOMY INGUINAL  12/11/2012    Procedure: HYDROCELECTOMY INGUINAL;;  Surgeon: Ceasar Silverio MD;  Location: [facility] OR     ORTHOPEDIC SURGERY      shoulder reduction for dislocation x3     VASCULAR SURGERY      carotid endarderectomy       Social History     Tobacco Use     Smoking status: Former Smoker     Smokeless tobacco: Never Used     Tobacco comment: quit 40 yrs ago   Substance Use Topics     Alcohol use: No     Comment: social     Family History   Problem Relation Age of Onset     Neurologic Disorder Brother         PARKINSONISM AGE 73     Hypertension Mother          Current Outpatient Medications   Medication Sig Dispense Refill     aspirin (ASPIRIN 81) 81 MG chewable tablet 81 mg       atorvastatin (LIPITOR) 20 MG tablet TAKE 1 TABLET BY MOUTH EVERY DAY 90 tablet 3     calcium-vitamin D (CALTRATE) 600-400 MG-UNIT per tablet Take 1 tablet by mouth daily        irbesartan (AVAPRO) 150 MG tablet TAKE 1 TABLET BY MOUTH EVERY DAY 90 tablet 3     Allergies   Allergen Reactions     Hay Fever & [A.R.M.]      Ragweeds        Reviewed and updated as needed this visit by Provider         Review of Systems   ROS COMP: CONSTITUTIONAL: NEGATIVE for fever, chills, change in weight  ENT/MOUTH: NEGATIVE for ear, mouth and throat problems  RESP: NEGATIVE for significant cough or SOB  CV: NEGATIVE for chest pain, palpitations or peripheral edema  : NEGATIVE for frequency, dysuria, or hematuria  MUSCULOSKELETAL: NEGATIVE for significant arthralgias or myalgia  NEURO: NEGATIVE for weakness, dizziness or paresthesias  HEME: NEGATIVE for bleeding problems  PSYCHIATRIC: NEGATIVE  "for changes in mood or affect      Objective    /84   Pulse 65   Temp 97.6  F (36.4  C) (Oral)   Resp 13   Ht 1.727 m (5' 8\")   Wt 91.9 kg (202 lb 11.2 oz)   SpO2 99%   BMI 30.82 kg/m    Body mass index is 30.82 kg/m .  Physical Exam   GENERAL:  alert and no distress  EYES: Eyes grossly normal to inspection, PERRL and conjunctivae and sclerae normal  HENT: ear canals and TM's normal, nose and mouth without ulcers or lesions  NECK: no adenopathy, no asymmetry, masses, or scars and thyroid normal to palpation  RESP: lungs clear to auscultation - no rales, rhonchi or wheezes  CV: regular rate and rhythm, normal S1 S2, no S3 or S4, no murmur, click or rub, no peripheral edema and peripheral pulses strong  ABDOMEN: soft, nontender, no hepatosplenomegaly, no masses and bowel sounds normal  MS: no gross musculoskeletal defects noted  NEURO: No focal changes  PSYCH: mentation appears normal, affect normal/bright       Assessment & Plan     1. Dark stools  Suspect related to Pepto-Bismol use but I recommended a repeat hemoglobin and fit test as well as stopping his Pepto-Bismol at this point peer  - Fecal colorectal cancer screen (FIT); Future  - Hemoglobin    2. Colon cancer screening  Test ordered due to above  - Fecal colorectal cancer screen (FIT); Future    3. Upper respiratory tract infection, unspecified type  Resolving without evidence of need for recurrent treatment    4. Essential hypertension, benign  Stable on therapy continuing with current medical management  - Basic metabolic panel     BMI:   Estimated body mass index is 30.82 kg/m  as calculated from the following:    Height as of this encounter: 1.727 m (5' 8\").    Weight as of this encounter: 91.9 kg (202 lb 11.2 oz).     Regular exercise    No follow-ups on file.    Gian Fuller MD  [facility]    "

## 2019-05-21 NOTE — LETTER
[facility]  [address] [address]  [phone]      5/22/2019       Leonard Garza  [address] [place] [id number]        Dear Héctor,    Your hemoglobin is normal.  Please turn in your FIT kit.    Your kidney function tests are slightly abnormal but stable and should be rechecked here in the clinic in 3-6 months with a follow-up visit with me.  I will look forward to seeing you at that time and please call to make an appointment.    Sincerely,      Gian

## 2019-05-29 ENCOUNTER — HOSPITAL ENCOUNTER (OUTPATIENT)
Dept: SPEECH THERAPY | Facility: CLINIC | Age: 84
Setting detail: THERAPIES SERIES
End: 2019-05-29
Attending: PHYSICAL MEDICINE & REHABILITATION
Payer: MEDICARE

## 2019-05-29 PROCEDURE — 92524 BEHAVRAL QUALIT ANALYS VOICE: CPT | Mod: GN | Performed by: STUDENT IN AN ORGANIZED HEALTH CARE EDUCATION/TRAINING PROGRAM

## 2019-06-06 NOTE — PROGRESS NOTES
[facility] OP SLP Voice Evaluation (LSVT LOUD)  05/29/19 0900   General Information   Type Of Visit Initial   Start Of Care Date 05/29/19   Referring Physician Bartolome Vila MD  (Neurology)   Orders Evaluate And Treat   Orders Comment hypophonic speech with PD   Medical Diagnosis Parkinson's disease   Onset Of Illness/injury Or Date Of Surgery 05/06/19  (order date)   Precautions/Limitations  fall precautions   Hearing Hearing loss--wears bilateral hearing aids   Surgical/Medical history reviewed Yes   Pertinent History Of Current Problem 85yo male with Parkinson's disease presenting with worsening voice and speech symptoms.  Pt reports that his voice will get softer as the day progresses, and he frequently needs his wife to remind him to use the loud technique trained in a previous course of LSVT LOUD therapy (completed with this SLP in June 2018).  Pt also notes a little increased strain and effort to produce his voice.  He denies any difficulties with speech intelligibility and articulation.  He denies difficulties with swallowing or breathing.  He reports inconsistent practice of his home maintenance program from his previous course of LSVT LOUD.  PMH significant for left carotid stenosis, abnormality of gait (will also be seeing PT for LSVT BIG), GERD, HTN.   Prior Level of Functioning Previous therapy for this problem.   Prior Level Of Function Comment Pt completed a full course of LSVT LOUD therapy with this SLP from 4/24/18-6/27/18.    Patient Role/employment History Retired  (MD in OB/GYN)   General Observations Pt reports that his voice is a little worse than usual d/t a cold.   Patient/family Goals To improve his vocal loudness   Personal Rating / Voice Use Rating   Describe the amount of typical non-work voice use Moderate   Describe the intensity of typical non-work voice use Moderate   Comments Vocal demands consist of conversational speech.  Voice problems are frequently upestting, and  frequently cause him to feel left out of conversations.  People occasionally have difficulty hearing him because of his voice, including in background noise.  He occasionally restricts his social activities and occasionally feels handicapped because of his voice.  He occasionally strains to produce voice and voice quality is occasionally unpredictable.  People occasionally ask him about what's wrong with his voice.  VHI-10: 13/40.   Voice Profile during conversation, 1 min monologue and paragraph reading   Voice Quality Hoarse   Voice quality comments SPEECH: Consistent mild breathiness, near consistent mild roughness, and intermittent mild strain.  SINGING: improved loudness with less roughness, breathiness, and strain compared to speech.  THERAPY PROBES: good improvement in vocal volume and quality with cues to increase loudness (improves to 76 dB on average for automatic speech).   Voice quality severity rating continuum (1=Severe, 7=WNL) 5  (CAPE-V Overall Severity: 35/100)   Breath control Tight   Breath Control comments Excessive thoracic muscle use pattern on inspiration for speech.  Inspirations for speech are shallow with poor respiratory/phonatory coordination.   Breath control severity rating continuum (1=Severe, 7=WNL) 5   Voice Use / Effort Throat push   Voice Use / Effort comments Pt rates his current phonatory effort for speech as 2-3/10 (10 is maximum effort).   Voice use / Effort severity rating continuum (1=Severe, 7=WNL) 5   Fundamental frequency (Hz)   (Centered around A2)   Pitch /Frequency Description Monotone   Pitch / Frequency severity rating continuum (1=Severe, 7=WNL) 5   Average dB 68 dB   Volume Too quiet   Volume comments Volume for conversational speech is mildly reduced, but adequate for the setting (1:1 conversation in quiet room).  A whisper is mildly rough.  Loud phonation is less hoarse with appropriate volume increase.   Volume severity rating continuum (1=Severe, 7=WNL) 5    Neuromuscular Control Hypokinetic   Neuromuscular Control severity rating continuum (1=Severe, 7=WNL) 5   Neuromuscular comments Mildly slow rate with occasionally imprecise articulation.  Labial/lingual AMRs and SMRs are WNL.   Resonance Hypernasal   Resonance severity rating continuum (1=Severe, 7=WNL) 6   Resonance comments Only present at pitch extremes   Comments Mild-moderate hypokinetic dysarthria and voice disorder characterized by breathiness, roughness, strain, reduced volume, monotone intonation, mildly slow rate with occasional articulatory imprecision, hypernasality at pitch extremes, poor respiratory/phonatory coordination, and increased phonatory effort.   Adduction /Abduction Function   Laryngeal diadokinetic speed   (Unable to determine d/t blurred quality)   Laryngeal diadokinetic strength   (Strained)   Laryngeal diadokinetic consistency Run together   Adduction / Abduction function comments Aberrations secondary to hypokinetic dysarthria   Adduction / Abduction function scale Age 66+, norm per sec:  4   Vibratory Function of Vocal Folds   Prolonged 'ah' at mid pitch (sec) 32.4 seconds at Bb2 with mild hoarseness   Prolonged 'ah' at high pitch (sec) 29 seconds at F#3 with hypernasality   Prolonged 'ah' at low pitch (sec) 33.5 seconds at F#2 with hypernasality   Vibratory Function of Vocal Folds Scale Males 20 - 80 yrs: 15 - 25 secs   Vibratory Function of Vocal Folds Comments WNL   Compression Strength of Vocal Folds / Larynx Muscles   Vowels average volume dB 75 dB at Bb2   Reading aloud average volume dB 70 dB   Conversation average volume dB 68 dB   Dynamic volume range minimum dB 60 dB   Dynamic volume range maximum dB 90 dB   Efficiency of increased volume Relaxed throat   Ease / Effort of loud/quiet volumes Quiet volume easier   Function of Lengtheners / Shorteners (CT and TA Muscles)   Pitch glides Upper pitches more dysphonic;Lower pitches more dysphonic  (Lowest: E2; Highest: F#4 in  "[facility] register)   General Therapy Interventions   Planned Therapy Interventions Voice   Voice LSVT generalized to community setting;Larynx strengthening;Larynx and TVF flexibility;Voice quality/pitch or volume tasks   Impressions and Recommendations   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  Voice and speech symptoms are characterized by breathiness, roughness, strain, reduced volume, monotone intonation, mildly slow rate with occasional articulatory imprecision, hypernasality at pitch extremes, poor respiratory/phonatory coordination, and increased phonatory effort.  Overall, symptoms and objective meaures of loudness and voice quality have worsened since he was discharged from SLP services in June 2018.   Recommendations A refresher course of LSVT LOUD is recommended in order to improve vocal volume, voice quality, and laryngeal strength for daily conversational speech demands.   Frequency and Duration 2x/week for 4 weeks with 1-2 monthly follow-ups   Prognosis  Good with intervention   Risks and Benefits of Treatment have been explained. Yes   Patient & /or Caregiver  in agreement with plan of care Yes   Patient Education SLP provided education regarding evaluation findings and proposed POC.  Pt verbalized understanding of and agreement with the POC.   Educational Assessment   Barriers to Learning No barriers   Preferred Learning Style Listening;Reading;Demonstration;Pictures / Video   Voice Goals   Voice Goals 1;2;3   Voice Goal 1   Goal Identifier Generalization   Goal Description Patient will learn, demonstrate, and implement a \"loud\" compensatory speech intelligibility strategy in order to report a week of typical activities in which dysphonia does not exceed a level of 2 out of 10, 90% of the time so that patient is able to effectively " communicate with friends/family in a variety of daily communication situations.   Target Date 08/27/19   Voice Goal 2   Goal Identifier Breath   Goal Description Patient will increase vocal loudness to an average SPL of >75 dB at 30 cm during sustained /a/ phonation independently in order to increase vocal respiratory support required for functional communication.   Target Date 08/27/19   Voice Goal 3   Goal Identifier Conversation   Goal Description Patient will increase vocal loudness during semi-spontaneous conversation tasks to a minimum range of 70-75 dB at 30 cm given min cues in order to improve vocal loudness for daily communication tasks.   Target Date 08/27/19   Total Session Time   Voice Minutes ([id number]) 40   Total Evaluation Time 40   Therapy Certification   Certification date from 05/29/19   Certification date to 08/27/19   Medical Diagnosis Parkinson's disease     Thank you for the referral of this patient.    Allison Alpers, B.A. (khadra), M.A., CCC-SLP  Speech-Language Pathologist  Certificate of Vocology  [facility]  [phone]

## 2019-06-06 NOTE — PROGRESS NOTES
"                                                                           [facility]          OUTPATIENT SPEECH LANGUAGE PATHOLOGY VOICE EVALUATION  PLAN OF TREATMENT FOR OUTPATIENT REHABILITATION  (COMPLETE FOR INITIAL CLAIMS ONLY)    Patient's Last Name, First Name, M.I.  YOB: 1935  Leonard Garza                        Provider s Name: [facility] Medical Record No.  [id number]     Onset Date:  5/6/2019    Start of Care Date:  5/29/2019   Type:     ___PT  __OT   _X_SLP    Medical Diagnosis: Parkinson's disease   Speech Language Pathology Diagnosis:  Mild-moderate hypokinetic dysarthria and voice disorder    Visits from SOC: 1      _________________________________________________________________________________  Plan of Treatment/Functional Goals:  Voice         Goals     1. Goal Identifier: Generalization       Goal Description: Patient will learn, demonstrate, and implement a \"loud\" compensatory speech intelligibility strategy in order to report a week of typical activities in which dysphonia does not exceed a level of 2 out of 10, 90% of the time so that patient is able to effectively communicate with friends/family in a variety of daily communication situations.       Target Date: 08/27/19   2. Goal Identifier: Breath       Goal Description: Patient will increase vocal loudness to an average SPL of >75 dB at 30 cm during sustained /a/ phonation independently in order to increase vocal respiratory support required for functional communication.       Target Date: 08/27/19   3. Goal Identifier: Conversation       Goal Description: Patient will increase vocal loudness during semi-spontaneous conversation tasks to a minimum range of 70-75 dB at 30 cm given min cues in order to improve vocal loudness for daily communication tasks.       Target Date: 08/27/19       Frequency and Duration: 2x/week for 4 weeks with 1-2 monthly follow-ups  Rosi DAMON" Alpers, SLP       I CERTIFY THE NEED FOR THESE SERVICES FURNISHED UNDER        THIS PLAN OF TREATMENT AND WHILE UNDER MY CARE .             Physician Signature               Date    X_____________________________________________________                      Certification Date From:  05/29/19  Certification Date To:  08/27/19  Referring Provider: Bartolome Vila MD                 Initial Assessment        See Epic Evaluation Start of Care

## 2019-06-06 NOTE — ADDENDUM NOTE
Encounter addended by: Alpers, Allison E, SLP on: 6/6/2019 2:46 PM   Actions taken: Flowsheet data copied forward, Flowsheet accepted, Sign clinical note

## 2019-06-13 ENCOUNTER — HOSPITAL ENCOUNTER (OUTPATIENT)
Dept: SPEECH THERAPY | Facility: CLINIC | Age: 84
Setting detail: THERAPIES SERIES
End: 2019-06-13
Attending: PSYCHIATRY & NEUROLOGY
Payer: MEDICARE

## 2019-06-13 PROCEDURE — 92507 TX SP LANG VOICE COMM INDIV: CPT | Mod: GN,KX | Performed by: STUDENT IN AN ORGANIZED HEALTH CARE EDUCATION/TRAINING PROGRAM

## 2019-06-18 ENCOUNTER — HOSPITAL ENCOUNTER (OUTPATIENT)
Dept: SPEECH THERAPY | Facility: CLINIC | Age: 84
Setting detail: THERAPIES SERIES
End: 2019-06-18
Attending: PSYCHIATRY & NEUROLOGY
Payer: MEDICARE

## 2019-06-18 PROCEDURE — 92507 TX SP LANG VOICE COMM INDIV: CPT | Mod: GN,KX | Performed by: STUDENT IN AN ORGANIZED HEALTH CARE EDUCATION/TRAINING PROGRAM

## 2019-06-26 ENCOUNTER — HOSPITAL ENCOUNTER (OUTPATIENT)
Dept: SPEECH THERAPY | Facility: CLINIC | Age: 84
Setting detail: THERAPIES SERIES
End: 2019-06-26
Attending: PSYCHIATRY & NEUROLOGY
Payer: MEDICARE

## 2019-06-26 PROCEDURE — 92507 TX SP LANG VOICE COMM INDIV: CPT | Mod: GN,KX | Performed by: STUDENT IN AN ORGANIZED HEALTH CARE EDUCATION/TRAINING PROGRAM

## 2019-06-27 ENCOUNTER — HOSPITAL ENCOUNTER (OUTPATIENT)
Dept: SPEECH THERAPY | Facility: CLINIC | Age: 84
Setting detail: THERAPIES SERIES
End: 2019-06-27
Attending: PSYCHIATRY & NEUROLOGY
Payer: MEDICARE

## 2019-06-27 PROCEDURE — 92507 TX SP LANG VOICE COMM INDIV: CPT | Mod: GN,KX | Performed by: STUDENT IN AN ORGANIZED HEALTH CARE EDUCATION/TRAINING PROGRAM

## 2019-07-11 ENCOUNTER — HOSPITAL ENCOUNTER (OUTPATIENT)
Dept: SPEECH THERAPY | Facility: CLINIC | Age: 84
Setting detail: THERAPIES SERIES
End: 2019-07-11
Attending: PSYCHIATRY & NEUROLOGY
Payer: MEDICARE

## 2019-07-11 PROCEDURE — 92507 TX SP LANG VOICE COMM INDIV: CPT | Mod: GN | Performed by: STUDENT IN AN ORGANIZED HEALTH CARE EDUCATION/TRAINING PROGRAM

## 2019-07-15 ENCOUNTER — HOSPITAL ENCOUNTER (OUTPATIENT)
Dept: SPEECH THERAPY | Facility: CLINIC | Age: 84
Setting detail: THERAPIES SERIES
End: 2019-07-15
Attending: PSYCHIATRY & NEUROLOGY
Payer: MEDICARE

## 2019-07-15 PROCEDURE — 92507 TX SP LANG VOICE COMM INDIV: CPT | Mod: GN,KX | Performed by: STUDENT IN AN ORGANIZED HEALTH CARE EDUCATION/TRAINING PROGRAM

## 2019-07-17 ENCOUNTER — HOSPITAL ENCOUNTER (OUTPATIENT)
Dept: SPEECH THERAPY | Facility: CLINIC | Age: 84
Setting detail: THERAPIES SERIES
End: 2019-07-17
Attending: PSYCHIATRY & NEUROLOGY
Payer: MEDICARE

## 2019-07-17 PROCEDURE — 92507 TX SP LANG VOICE COMM INDIV: CPT | Mod: GN | Performed by: STUDENT IN AN ORGANIZED HEALTH CARE EDUCATION/TRAINING PROGRAM

## 2019-07-22 ENCOUNTER — HOSPITAL ENCOUNTER (OUTPATIENT)
Dept: SPEECH THERAPY | Facility: CLINIC | Age: 84
Setting detail: THERAPIES SERIES
End: 2019-07-22
Attending: PSYCHIATRY & NEUROLOGY
Payer: MEDICARE

## 2019-07-22 PROCEDURE — 92507 TX SP LANG VOICE COMM INDIV: CPT | Mod: GN,KX | Performed by: STUDENT IN AN ORGANIZED HEALTH CARE EDUCATION/TRAINING PROGRAM

## 2019-08-27 ENCOUNTER — HOSPITAL ENCOUNTER (OUTPATIENT)
Dept: SPEECH THERAPY | Facility: CLINIC | Age: 84
Setting detail: THERAPIES SERIES
End: 2019-08-27
Attending: PSYCHIATRY & NEUROLOGY
Payer: MEDICARE

## 2019-08-27 PROCEDURE — 92507 TX SP LANG VOICE COMM INDIV: CPT | Mod: GN | Performed by: STUDENT IN AN ORGANIZED HEALTH CARE EDUCATION/TRAINING PROGRAM

## 2019-08-27 NOTE — PROGRESS NOTES
"Outpatient Speech Language Pathology Discharge Note     Patient: Leonard Garza  : 1935    Beginning/End Dates of Reporting Period:  2019 to 2019; 9 therapy sessions since evaluation    Referring Provider: Dr. Vila    Therapy Diagnosis: Mild-moderate hypokinetic dysarthria and voice disorder    Client Self Report: Patient arrived 30 minutes late, initially thinking that his session was next week.  He reports that his voice has been improving, and that he feels like he is maintaining his improved vocal volume.     Objective Measurements:      Pt demonstrating intermittent moderate roughness and breathiness vs secretion noise in reading aloud tasks, which improves following sips of water and reattention to therapy techniques.     Pt completed the previously trained LSVT LOUD daily tasks when given no-min cues from SLP to use the loud technique.  Pt completed the maximum sustained /a/ x5 with an average 81 dB and 29.9 seconds duration.  Pt completed the maximum fundamental frequency range (High/Low) tasks x5 each in a range of Ab2-G3 while maintaining loudness >70 dB.  Pt read aloud the 10 functional phrases x2 with an average loudness of 75 dB.  Given min cues from SLP to use the loud technique, pt participated in semi-spontaneous conversation with SLP=71 dB and read aloud short articles from [employer] magazine=71 dB.  SLP reviewed education regarding an optimal home regimen of practice of therapy exercises for maintenance of improvements, as well as when to return to therapy if symptoms worsen.  Pt verbalized understanding.        Goals:  Goal Identifier Generalization   Goal Description Patient will learn, demonstrate, and implement a \"loud\" compensatory speech intelligibility strategy in order to report a week of typical activities in which dysphonia does not exceed a level of 2 out of 10, 90% of the time so that patient is able to effectively communicate with friends/family in a " "variety of daily communication situations.   Target Date 08/27/19   Date Met  08/27/19   Progress:  Good, goal met per objective measures above and per patient report.     Goal Identifier Breath   Goal Description Patient will increase vocal loudness to an average SPL of >75 dB at 30 cm during sustained /a/ phonation independently in order to increase vocal respiratory support required for functional communication.   Target Date 08/27/19   Date Met  08/27/19   Progress:  Good, goal met per objective measures above.     Goal Identifier Conversation   Goal Description Patient will increase vocal loudness during semi-spontaneous conversation tasks to a minimum range of 70-75 dB at 30 cm given min cues in order to improve vocal loudness for daily communication tasks.   Target Date 08/27/19   Date Met  08/27/19   Progress:  Good, goal met per objective measures above.     Progress Toward Goals:    Progress this reporting period: Good, all goals met.  Patient has demonstrated consistent improvements in vocal volume and voice quality in this \"refresher\" course of skilled speech therapy using LSVT LOUD techniques and exercises.  Patient is currently meeting all volume targets with no-min cues in the LSVT LOUD daily tasks and in semi-spontaneous conversation and with min cues in narrative reading.  Patient is motivated to continue with a home program of practice of therapy exercises to promote maintenance of improvements.    Plan:  Discharge from therapy.    Discharge:    Reason for Discharge: Patient has met all goals.      Discharge Plan: Patient to continue home program.      Allison Alpers, B.A. (music), M.A., CCC-SLP  Speech-Language Pathologist  Certificate of Vocology  [facility]  [phone]      "

## 2019-10-29 ENCOUNTER — TRANSFERRED RECORDS (OUTPATIENT)
Dept: HEALTH INFORMATION MANAGEMENT | Facility: CLINIC | Age: 84
End: 2019-10-29

## 2019-12-04 ENCOUNTER — TELEPHONE (OUTPATIENT)
Dept: INTERNAL MEDICINE | Facility: CLINIC | Age: 84
End: 2019-12-04

## 2019-12-04 DIAGNOSIS — R11.2 NAUSEA AND VOMITING, INTRACTABILITY OF VOMITING NOT SPECIFIED, UNSPECIFIED VOMITING TYPE: Primary | ICD-10-CM

## 2019-12-04 RX ORDER — ONDANSETRON 4 MG/1
4 TABLET, ORALLY DISINTEGRATING ORAL EVERY 8 HOURS PRN
Qty: 12 TABLET | Refills: 0 | Status: SHIPPED | OUTPATIENT
Start: 2019-12-04 | End: 2021-07-23

## 2019-12-04 NOTE — TELEPHONE ENCOUNTER
Wife calling.  They are in [place] and pt got really air sick after the flight, cannot stop vomiting.      Tried going to urgent care but wait is too long,  Wondering if you would be will to send anti-nausea medication to pharmacy in [place] for him.  Let wife know if this is possible.

## 2019-12-04 NOTE — TELEPHONE ENCOUNTER
May inform that I sent 12 tablets of Zofran for use for nausea/vomiting.  If symptoms go beyond 3 days for the prescription the patient obviously should be seen by healthcare system or urgent care down there.

## 2020-03-27 ENCOUNTER — TRANSFERRED RECORDS (OUTPATIENT)
Dept: HEALTH INFORMATION MANAGEMENT | Facility: CLINIC | Age: 85
End: 2020-03-27

## 2020-04-07 ENCOUNTER — TRANSFERRED RECORDS (OUTPATIENT)
Dept: HEALTH INFORMATION MANAGEMENT | Facility: CLINIC | Age: 85
End: 2020-04-07

## 2020-06-09 ENCOUNTER — OFFICE VISIT (OUTPATIENT)
Dept: NEUROLOGY | Facility: CLINIC | Age: 85
End: 2020-06-09
Payer: MEDICARE

## 2020-06-09 VITALS
DIASTOLIC BLOOD PRESSURE: 70 MMHG | BODY MASS INDEX: 28 KG/M2 | HEIGHT: 70 IN | SYSTOLIC BLOOD PRESSURE: 110 MMHG | HEART RATE: 83 BPM | OXYGEN SATURATION: 97 % | WEIGHT: 195.6 LBS | TEMPERATURE: 98 F

## 2020-06-09 DIAGNOSIS — G20.C PRIMARY PARKINSONISM (H): Primary | ICD-10-CM

## 2020-06-09 PROCEDURE — 99205 OFFICE O/P NEW HI 60 MIN: CPT | Performed by: PSYCHIATRY & NEUROLOGY

## 2020-06-09 PROCEDURE — 99358 PROLONG SERVICE W/O CONTACT: CPT | Performed by: PSYCHIATRY & NEUROLOGY

## 2020-06-09 ASSESSMENT — MIFFLIN-ST. JEOR: SCORE: 1588.49

## 2020-06-09 NOTE — PROGRESS NOTES
INITIAL NEUROLOGY CONSULTATION    DATE OF VISIT: 6/9/2020  CLINIC LOCATION: [facility]  MRN: [id number]  PATIENT NAME: Leonard Garza  YOB: 1935    PRIMARY CARE PROVIDER: Gian Fuller MD     REASON FOR VISIT:   Chief Complaint   Patient presents with     Establish Care     HISTORY OF PRESENT ILLNESS:                                                    Mr. 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  His prior neurologist retired.  The patient is accompanied by his wife, who participates in the interview.    Per patient's report and review of available medical records (prior neurology notes), he developed mild change in balance, shortened stride, reduced left arm swing, infrequent falls, hypomimia, hypophonic speech, and the reduced coordination on the left side several years ago.  Initially, he was seen by Dr. Vila from [facility] (2017).  Admitted that it is harder for him to get out of chair.  Denied any micrographia, tremor, anosmia, swallowing difficulty, or RBD.  Reported mild constipation.  According to the notes, brain MRI was negative for acute intracranial pathology, but demonstrated changes consistent with chronic small vessel ischemic disease and increased cerebral atrophy compared to 2006 study.  The presentation felt consistent with mild parkinsonism.  Tried Sinemet (2018), but developed intolerable nausea and vomiting on 100 mg dose.  Later on noticed gait changes (shuffling gait).  [facility]ent through [facility], which was helpful.    At the present time, the patient reports continued balance difficulty, anosmia,  hypomimia, hypophonia, gait changes (as per above), micrographia, and 2 recent falls.  The first 1 was approximately 6 months ago, and the second was approximately a month ago.  His wife adds that patient has difficulty learning new technology for the last 10 years.  She also noticed some personality changes including loss sense of humor and passivity.  She feels that he has sad mood and lack of interest in things around him.  No other concerns.    The patient was recently evaluated by a neurologist, Dr. Blanco, in [place].  His brain MRI from 03/27/2020 demonstrated no acute intracranial process.  There is moderate to advanced global parenchymal volume loss that is accelerated for patient's age and moderate sequela of chronic microangiopathic changes with consideration given to vascular dementia (Binswanger's disease).  DaTSCAN from 04/07/2020 was normal.  His [place] neurologist felt that the presentation would be consistent with vascular parkinsonism reportedly, though I do not have recent neurology notes from [place].  The records have been requested.  Ubrelvy was prescribed for migraine.    The patient reports history of prior episode of the left central retinal artery occlusion with reduced vision in the left eye that led to left carotid endarterectomy due to 80 to 99% stenosis of left internal carotid artery based on neck MRA from 2006.    According to Care Everywhere, the patient was evaluated at the emergency department of [facility] in [place] for acute migraine.  His head CT from 3/8/2020 demonstrated no evidence of acute intracranial pathology.  Cerebral atrophy and chronic small vessel ischemic disease changes along with mild mucosal thickening in the maxillary sinuses were noted.    No additional useful information is available in Care Everywhere, which was reviewed.    Review of Systems - the patient endorses feet edema, headaches, memory problems, bilateral hearing loss, heartburn, difficulty  urinating, and easy skin bruising.  All of them have been previously discussed with other medical providers. Otherwise, he denies any other complaints on 14-point comprehensive review of systems.  PAST MEDICAL/SURGICAL HISTORY:                                                    I personally reviewed patient's past medical and surgical history with the patient at today's visit.  Patient Active Problem List   Diagnosis     Headache     Hemorrhage of gastrointestinal tract     Essential hypertension, benign     Peripheral vascular disease (H)     Malignant basal cell neoplasm of skin     Inguinal hernia     CARDIOVASCULAR SCREENING; LDL GOAL LESS THAN 100     Health Care Home     Gastroesophageal reflux disease without esophagitis     PVC's (premature ventricular contractions)     BPH (benign prostatic hyperplasia)     Past Medical History:   Diagnosis Date     Basal cell cancer 10/6/2010     Basal cell cancer 10/6/2010     CKD (chronic kidney disease) stage 3, GFR 30-59 ml/min (H) 6/13/2011     Hypertension      Inguinal hernia 10/6/2010     PERIPH VASCULAR DIS NOS 8/17/2006     Unspecified cerebral artery occlusion with cerebral infarction      Past Surgical History:   Procedure Laterality Date     CYSTOSCOPY, LITHOLAPAXY, COMBINED N/A 11/14/2016    Procedure: COMBINED CYSTOSCOPY, LITHOLAPAXY;  Surgeon: Ceasar Silverio MD;  Location:  OR     CYSTOSCOPY, TRANSURETHRAL RESECTION (TUR) PROSTATE, COMBINED N/A 11/14/2016    Procedure: COMBINED CYSTOSCOPY, TRANSURETHRAL RESECTION (TUR) PROSTATE;  Surgeon: Ceasar Silverio MD;  Location:  OR     ENT SURGERY      nasal septoplasty for fx x3     GENITOURINARY SURGERY      remove testicle     HERNIORRHAPHY INGUINAL  12/11/2012    Procedure: HERNIORRHAPHY INGUINAL;  Right Inguinal Hernia Repair with Mesh, Right Hydrocelectomy;  Surgeon: Miles Redd MD;  Location:  OR     HYDROCELECTOMY INGUINAL  12/11/2012    Procedure: HYDROCELECTOMY INGUINAL;;  Surgeon:  "Ceasar Silverio MD;  Location: [facility] OR     ORTHOPEDIC SURGERY      shoulder reduction for dislocation x3     VASCULAR SURGERY      carotid endarderectomy     MEDICATIONS:                                                    I personally reviewed patient's medications and allergies with the patient at today's visit.  aspirin (ASPIRIN 81) 81 MG chewable tablet, 81 mg  atorvastatin (LIPITOR) 20 MG tablet, TAKE 1 TABLET BY MOUTH EVERY DAY  calcium-vitamin D (CALTRATE) 600-400 MG-UNIT per tablet, Take 1 tablet by mouth daily   irbesartan (AVAPRO) 150 MG tablet, TAKE 1 TABLET BY MOUTH EVERY DAY  ondansetron (ZOFRAN-ODT) 4 MG ODT tab, Take 1 tablet (4 mg) by mouth every 8 hours as needed for nausea    No current facility-administered medications on file prior to visit.     ALLERGIES:                                                      Allergies   Allergen Reactions     Hay Fever & [A.R.M.]      Ragweeds      FAMILY/SOCIAL HISTORY:                                                    Family and social history was reviewed with the patient at today's visit.   Family history is positive for headache/migraine, epilepsy, Parkinson's disease (brother and uncle) and ALS (cousins).  Former smoker, quit over 40 years ago.  , lives with his wife.  Rare alcohol use.  Denies recreational drug use.  Retired physician.  REVIEW OF SYSTEMS:                                                    Patient has completed a [facility] Patient Health History, including a 14-system review, which was personally reviewed, and pertinent positives are listed in HPI. He denies any additional problems on the further questioning.  EXAM:                                                    VITAL SIGNS:   /70   Pulse 83   Temp 98  F (36.7  C) (Oral)   Ht 1.778 m (5' 10\")   Wt 88.7 kg (195 lb 9.6 oz)   SpO2 97%   BMI 28.07 kg/m    Mini-Cog Assessment:  Mini Cog Assessment  Clock Draw Score: 2 Normal  3 Item Recall: 1 object " recalled  Mini Cog Total Score: 3    General: pt is in NAD, cooperative.  Skin: normal turgor, moist mucous membranes, no lesions/rashes noticed.  HEENT: ATNC, EOMI, PERRL, white sclera, normal conjunctiva, no nystagmus or ptosis. No carotid bruits bilaterally.  Respiratory: lung sounds clear to auscultation bilaterally, no crackles, wheezes, rhonchi. Symmetric lung excursion, no accessory respiratory muscle use.  Cardiovascular: normal S1/S2, no murmurs/rubs/gallops.   Abdomen: Not distended.  : deferred.    Neurological:  Mental: alert, follows commands, Mini Cog Total Score: 3/5 with 1/3 on memory recall, no aphasia or dysarthria. Fund of knowledge is diminished for age.  Cranial Nerves:  CN II: visual acuity - able to accurately count fingers with each eye. Visual fields intact, fundi: discs sharp, no papilledema and normal vessels bilaterally.  CN III, IV, VI: EOM intact, pupils equal and reactive  CN V: facial sensation nl  CN VII: face symmetric, no facial droop.  Mild hypomimia.  CN VIII: hearing reduced bilaterally, wears bilateral hearing aids  CN IX: palate elevation symmetric, uvula at midline.  Mild hypophonia.  CN XI SCM normal, shoulder shrug nl  CN XII: tongue midline  Motor: Strength: 5/5 in all major groups of all extremities.  Mild bradykinesia.  Normal tone, though slightly increases with provoking maneuvers, especially on the left. No tremor or other abnormal movements. No pronator drift b/l.  Reflexes: Triceps, biceps, and brachioradialis reflexes are symmetric bilaterally, patellar and achilles reflexes are hard to elicit bilaterally. No clonus noted. Toes are down-going b/l.   Sensory: temperature, light touch, pinprick, and vibration intact. Romberg: negative.  Coordination: FNF and heel-shin tests intact b/l.   Gait: Slightly stooped forward posture, slightly decreased stride length and reduced arm swings.  Pull test is negative.  DATA:   LABS/IMAGING/OTHER STUDIES: I reviewed pertinent  medical records, including review of prior neurology notes and Care Everywhere, as detailed in the history of present illness.  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    We had a detailed discussion with the patient regarding his presenting complaints, suspected diagnosis, available treatment options, and the plan.  His neurological exam today demonstrates mild parkinsonian features and decreased memory.  The differential includes vascular parkinsonism, including Binswanger's disease, and less likely beginning of Parkinson-plus conditions.  We reviewed options of retrial of Sinemet gradually versus amantadine versus dopamine agonists and decided not to start any medications at the present time.  He felt that [facility] was very beneficial for him in the past, and I placed a new referral.    DIAGNOSES:    ICD-10-CM    1. Primary Parkinsonism (H)  G20      PLAN: At today's visit we thoroughly discussed various diagnostic possibilities for patient's symptoms, results of previous evaluation, available treatment options, and the plan, which includes:  Orders Placed This Encounter   Procedures     SPEECH THERAPY REFERRAL     PHYSICAL THERAPY REFERRAL     OCCUPATIONAL THERAPY REFERRAL     No new medications.    Next follow-up appointment is in the next 6 months or earlier if needed.    Total Time:  62 minutes with > 50% spent counseling the patient on stated above assessment and recommendations, including nature of the diagnosis, available treatment options, and proposed plan.  Additional 31 minutes were spent in chart review in preparation for this visit.    Vasu Georges MD  / Neurology  [place]  (Chart documentation was completed in part with Dragon voice-recognition software. Even though reviewed, some grammatical, spelling, and word errors may remain.)

## 2020-06-09 NOTE — PATIENT INSTRUCTIONS
AFTER VISIT SUMMARY (AVS):    At today's visit we thoroughly discussed various diagnostic possibilities for your symptoms, results of previous evaluation, available treatment options, and the plan, which includes:  Orders Placed This Encounter   Procedures     SPEECH THERAPY REFERRAL     PHYSICAL THERAPY REFERRAL     OCCUPATIONAL THERAPY REFERRAL     No new medications.    Next follow-up appointment is in the next 6 months or earlier if needed.    Please do not hesitate to call me with any questions or concerns.    Thanks.

## 2020-06-09 NOTE — LETTER
6/9/2020         RE: Leonard Garza  [address] [facility] [address]        Dear Colleague,    Thank you for referring your patient, Leonard Garza, to the [facility]. Please see a copy of my visit note below.    INITIAL NEUROLOGY CONSULTATION    DATE OF VISIT: 6/9/2020  CLINIC LOCATION: [facility]  MRN: [id number]  PATIENT NAME: Leonard Garza  YOB: 1935    PRIMARY CARE PROVIDER: Gian Fuller MD     REASON FOR VISIT:   Chief Complaint   Patient presents with     Establish Care     HISTORY OF PRESENT ILLNESS:                                                    Mr. 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  His prior neurologist retired.  The patient is accompanied by his wife, who participates in the interview.    Per patient's report and review of available medical records (prior neurology notes), he developed mild change in balance, shortened stride, reduced left arm swing, infrequent falls, hypomimia, hypophonic speech, and the reduced coordination on the left side several years ago.  Initially, he was seen by Dr. Vila from [facility] (2017).  Admitted that it is harder for him to get out of chair.  Denied any micrographia, tremor, anosmia, swallowing difficulty, or RBD.  Reported mild constipation.  According to the notes, brain MRI was negative for acute intracranial pathology, but demonstrated changes consistent with chronic small vessel ischemic disease and increased cerebral atrophy compared to 2006 study.  The presentation felt consistent with mild parkinsonism.  Tried Sinemet (2018), but  developed intolerable nausea and vomiting on 100 mg dose.  Later on noticed gait changes (shuffling gait).  Went through [facility], which was helpful.    At the present time, the patient reports continued balance difficulty, anosmia, hypomimia, hypophonia, gait changes (as per above), micrographia, and 2 recent falls.  The first 1 was approximately 6 months ago, and the second was approximately a month ago.  His wife adds that patient has difficulty learning new technology for the last 10 years.  She also noticed some personality changes including loss sense of humor and passivity.  She feels that he has sad mood and lack of interest in things around him.  No other concerns.    The patient was recently evaluated by a neurologist, Dr. Blanco, in [place].  His brain MRI from 03/27/2020 demonstrated no acute intracranial process.  There is moderate to advanced global parenchymal volume loss that is accelerated for patient's age and moderate sequela of chronic microangiopathic changes with consideration given to vascular dementia (Binswanger's disease).  DaTSCAN from 04/07/2020 was normal.  His [place] neurologist felt that the presentation would be consistent with vascular parkinsonism reportedly, though I do not have recent neurology notes from [place].  The records have been requested.  Ubrelvy was prescribed for migraine.    The patient reports history of prior episode of the left central retinal artery occlusion with reduced vision in the left eye that led to left carotid endarterectomy due to 80 to 99% stenosis of left internal carotid artery based on neck MRA from 2006.    According to Care Everywhere, the patient was evaluated at the emergency department of [facility] in [place] for acute migraine.  His head CT from 3/8/2020 demonstrated no evidence of acute intracranial pathology.  Cerebral atrophy and chronic small vessel ischemic disease changes along with mild mucosal thickening in the maxillary  sinuses were noted.    No additional useful information is available in Care Everywhere, which was reviewed.    Review of Systems - the patient endorses feet edema, headaches, memory problems, bilateral hearing loss, heartburn, difficulty urinating, and easy skin bruising.  All of them have been previously discussed with other medical providers. Otherwise, he denies any other complaints on 14-point comprehensive review of systems.  PAST MEDICAL/SURGICAL HISTORY:                                                    I personally reviewed patient's past medical and surgical history with the patient at today's visit.  Patient Active Problem List   Diagnosis     Headache     Hemorrhage of gastrointestinal tract     Essential hypertension, benign     Peripheral vascular disease (H)     Malignant basal cell neoplasm of skin     Inguinal hernia     CARDIOVASCULAR SCREENING; LDL GOAL LESS THAN 100     Health Care Home     Gastroesophageal reflux disease without esophagitis     PVC's (premature ventricular contractions)     BPH (benign prostatic hyperplasia)     Past Medical History:   Diagnosis Date     Basal cell cancer 10/6/2010     Basal cell cancer 10/6/2010     CKD (chronic kidney disease) stage 3, GFR 30-59 ml/min (H) 6/13/2011     Hypertension      Inguinal hernia 10/6/2010     PERIPH VASCULAR DIS NOS 8/17/2006     Unspecified cerebral artery occlusion with cerebral infarction      Past Surgical History:   Procedure Laterality Date     CYSTOSCOPY, LITHOLAPAXY, COMBINED N/A 11/14/2016    Procedure: COMBINED CYSTOSCOPY, LITHOLAPAXY;  Surgeon: Ceasar Silverio MD;  Location:  OR     CYSTOSCOPY, TRANSURETHRAL RESECTION (TUR) PROSTATE, COMBINED N/A 11/14/2016    Procedure: COMBINED CYSTOSCOPY, TRANSURETHRAL RESECTION (TUR) PROSTATE;  Surgeon: Ceasar Silverio MD;  Location:  OR     ENT SURGERY      nasal septoplasty for fx x3     GENITOURINARY SURGERY      remove testicle     HERNIORRHAPHY INGUINAL  12/11/2012     Procedure: HERNIORRHAPHY INGUINAL;  Right Inguinal Hernia Repair with Mesh, Right Hydrocelectomy;  Surgeon: Miles Redd MD;  Location: [facility] OR     HYDROCELECTOMY INGUINAL  12/11/2012    Procedure: HYDROCELECTOMY INGUINAL;;  Surgeon: Ceasar Silverio MD;  Location: [facility] OR     ORTHOPEDIC SURGERY      shoulder reduction for dislocation x3     VASCULAR SURGERY      carotid endarderectomy     MEDICATIONS:                                                    I personally reviewed patient's medications and allergies with the patient at today's visit.  aspirin (ASPIRIN 81) 81 MG chewable tablet, 81 mg  atorvastatin (LIPITOR) 20 MG tablet, TAKE 1 TABLET BY MOUTH EVERY DAY  calcium-vitamin D (CALTRATE) 600-400 MG-UNIT per tablet, Take 1 tablet by mouth daily   irbesartan (AVAPRO) 150 MG tablet, TAKE 1 TABLET BY MOUTH EVERY DAY  ondansetron (ZOFRAN-ODT) 4 MG ODT tab, Take 1 tablet (4 mg) by mouth every 8 hours as needed for nausea    No current facility-administered medications on file prior to visit.     ALLERGIES:                                                      Allergies   Allergen Reactions     Hay Fever & [A.R.M.]      Ragweeds      FAMILY/SOCIAL HISTORY:                                                    Family and social history was reviewed with the patient at today's visit.   Family history is positive for headache/migraine, epilepsy, Parkinson's disease (brother and uncle) and ALS (cousins).  Former smoker, quit over 40 years ago.  , lives with his wife.  Rare alcohol use.  Denies recreational drug use.  Retired physician.  REVIEW OF SYSTEMS:                                                    Patient has completed a [facility] Patient Health History, including a 14-system review, which was personally reviewed, and pertinent positives are listed in HPI. He denies any additional problems on the further questioning.  EXAM:                                                    VITAL SIGNS:   /70   " Pulse 83   Temp 98  F (36.7  C) (Oral)   Ht 1.778 m (5' 10\")   Wt 88.7 kg (195 lb 9.6 oz)   SpO2 97%   BMI 28.07 kg/m    Mini-Cog Assessment:  Mini Cog Assessment  Clock Draw Score: 2 Normal  3 Item Recall: 1 object recalled  Mini Cog Total Score: 3    General: pt is in NAD, cooperative.  Skin: normal turgor, moist mucous membranes, no lesions/rashes noticed.  HEENT: ATNC, EOMI, PERRL, white sclera, normal conjunctiva, no nystagmus or ptosis. No carotid bruits bilaterally.  Respiratory: lung sounds clear to auscultation bilaterally, no crackles, wheezes, rhonchi. Symmetric lung excursion, no accessory respiratory muscle use.  Cardiovascular: normal S1/S2, no murmurs/rubs/gallops.   Abdomen: Not distended.  : deferred.    Neurological:  Mental: alert, follows commands, Mini Cog Total Score: 3/5 with 1/3 on memory recall, no aphasia or dysarthria. Fund of knowledge is diminished for age.  Cranial Nerves:  CN II: visual acuity - able to accurately count fingers with each eye. Visual fields intact, fundi: discs sharp, no papilledema and normal vessels bilaterally.  CN III, IV, VI: EOM intact, pupils equal and reactive  CN V: facial sensation nl  CN VII: face symmetric, no facial droop.  Mild hypomimia.  CN VIII: hearing reduced bilaterally, wears bilateral hearing aids  CN IX: palate elevation symmetric, uvula at midline.  Mild hypophonia.  CN XI SCM normal, shoulder shrug nl  CN XII: tongue midline  Motor: Strength: 5/5 in all major groups of all extremities.  Mild bradykinesia.  Normal tone, though slightly increases with provoking maneuvers, especially on the left. No tremor or other abnormal movements. No pronator drift b/l.  Reflexes: Triceps, biceps, and brachioradialis reflexes are symmetric bilaterally, patellar and achilles reflexes are hard to elicit bilaterally. No clonus noted. Toes are down-going b/l.   Sensory: temperature, light touch, pinprick, and vibration intact. Romberg: " negative.  Coordination: FNF and heel-shin tests intact b/l.   Gait: Slightly stooped forward posture, slightly decreased stride length and reduced arm swings.  Pull test is negative.  DATA:   LABS/IMAGING/OTHER STUDIES: I reviewed pertinent medical records, including review of prior neurology notes and Care Everywhere, as detailed in the history of present illness.  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    We had a detailed discussion with the patient regarding his presenting complaints, suspected diagnosis, available treatment options, and the plan.  His neurological exam today demonstrates mild parkinsonian features and decreased memory.  The differential includes vascular parkinsonism, including Binswanger's disease, and less likely beginning of Parkinson-plus conditions.  We reviewed options of retrial of Sinemet gradually versus amantadine versus dopamine agonists and decided not to start any medications at the present time.  He felt that [facility] was very beneficial for him in the past, and I placed a new referral.    DIAGNOSES:    ICD-10-CM    1. Primary Parkinsonism (H)  G20      PLAN: At today's visit we thoroughly discussed various diagnostic possibilities for patient's symptoms, results of previous evaluation, available treatment options, and the plan, which includes:  Orders Placed This Encounter   Procedures     SPEECH THERAPY REFERRAL     PHYSICAL THERAPY REFERRAL     OCCUPATIONAL THERAPY REFERRAL     No new medications.    Next follow-up appointment is in the next 6 months or earlier if needed.    Total Time:  62 minutes with > 50% spent counseling the patient on stated above assessment and recommendations, including nature of the diagnosis, available treatment options, and proposed plan.  Additional 31 minutes were spent in chart review in preparation for  this visit.    Vasu Georges MD  / Neurology  [place]  (Chart documentation was completed in part with Dragon voice-recognition software. Even though reviewed, some grammatical, spelling, and word errors may remain.)            Again, thank you for allowing me to participate in the care of your patient.        Sincerely,        Vasu Georges MD

## 2020-06-30 ENCOUNTER — HOSPITAL ENCOUNTER (OUTPATIENT)
Dept: SPEECH THERAPY | Facility: CLINIC | Age: 85
Setting detail: THERAPIES SERIES
End: 2020-06-30
Attending: PEDIATRICS
Payer: MEDICARE

## 2020-06-30 ENCOUNTER — HOSPITAL ENCOUNTER (OUTPATIENT)
Dept: PHYSICAL THERAPY | Facility: CLINIC | Age: 85
Setting detail: THERAPIES SERIES
End: 2020-06-30
Attending: PEDIATRICS
Payer: MEDICARE

## 2020-06-30 PROCEDURE — 92507 TX SP LANG VOICE COMM INDIV: CPT | Mod: GN | Performed by: STUDENT IN AN ORGANIZED HEALTH CARE EDUCATION/TRAINING PROGRAM

## 2020-06-30 PROCEDURE — 92524 BEHAVRAL QUALIT ANALYS VOICE: CPT | Mod: GN | Performed by: STUDENT IN AN ORGANIZED HEALTH CARE EDUCATION/TRAINING PROGRAM

## 2020-06-30 PROCEDURE — 97162 PT EVAL MOD COMPLEX 30 MIN: CPT | Mod: GP | Performed by: PHYSICAL THERAPIST

## 2020-06-30 ASSESSMENT — 6 MINUTE WALK TEST (6MWT): TOTAL DISTANCE WALKED (FT): 1245

## 2020-07-01 NOTE — PROGRESS NOTES
[facility]        OUTPATIENT PHYSICAL THERAPY FUNCTIONAL EVALUATION  PLAN OF TREATMENT FOR OUTPATIENT REHABILITATION  (COMPLETE FOR INITIAL CLAIMS ONLY)  Patient's Last Name, First Name, M.I.  YOB: 1935  Leonard Garza     Provider's Name   [facility]   Medical Record No.  [id number]     Start of Care Date:  06/30/20   Onset Date:  06/09/20   Type:     _X__PT   ____OT  ____SLP Medical Diagnosis:   Parkinson's     PT Diagnosis:  Difficulty walking Visits from SOC:  1                              __________________________________________________________________________________  Plan of Treatment/Functional Goals:  balance training, gait training, neuromuscular re-education, ROM, strengthening, stretching, transfer training           GOALS  6MWT  Client will be able to complete 150 ft more on 6MWT to demonstrate improved balance and gait with longer distance walking  09/27/20    FGA  Client will improve balance on FGA to at least a 23/30 to help decrease risk for falls   09/27/20    30 sec STS  Client will be able to do at least 12 reps on the 30 sec STS test demontrating improved ability to transition with transfers  09/27/20    BIG program  Client will be indepndent with BIG exs and understand BIG principles to incorporate with all activities  09/27/20               Therapy Frequency:      Predicted Duration of Therapy Intervention:  3x week for 4 weeks within 90 days    Ramonita Emery, PT                                    I CERTIFY THE NEED FOR THESE SERVICES FURNISHED UNDER        THIS PLAN OF TREATMENT AND WHILE UNDER MY CARE     (Physician co-signature of this document indicates review and certification of the therapy plan).                Certification Date From:    6/30/20  Certification Date To: 9/27/20       Referring Provider:  Dr. Leone  José Manuel    Initial Assessment  See Epic Evaluation- Start of Care Date: 06/30/20

## 2020-07-01 NOTE — PROGRESS NOTES
06/30/20 1500   Quick Adds   Type of Visit Initial OP PT Evaluation   General Information   Start of Care Date 06/30/20   Referring Physician Dr. Vasu Georges   Orders Evaluate and Treat as Indicated   Order Date 06/09/20   Medical Diagnosis Primary Parkinsonism   Onset of illness/injury or Date of Surgery 06/09/20   Surgical/Medical history reviewed Yes   Pertinent history of current problem (include personal factors and/or comorbidities that impact the POC)   85 year old male developed mild change in balance, shortened stride, reduced bilateral, with it being a little worse on the L side,  arm swing, infrequent falls, hypomimia, hypophonic speech, and the reduced coordination on the left side several years ago.  Initially, he was seen by Dr. Vial from [facility] (2017).  Admitted that it is harder for him to get out of chair.  Denied any micrographia, tremor, anosmia, swallowing difficulty, or RBD.  Reported mild constipation.  According to the notes, brain MRI was negative for acute intracranial pathology, but demonstrated changes consistent with chronic small vessel ischemic disease and increased cerebral atrophy compared to 2006 study.  The presentation felt consistent with mild parkinsonism.  Tried Sinemet (2018), but developed intolerable nausea and vomiting on 100 mg dose.  Later on noticed gait changes (shuffling gait).  Went through [facility] (through neuro clinic), which was helpful. Seen at this clinic in 2017.. Reports about 4 falls in the last couple of years.  Reports slower with buttoning, writing is getting smaller, rolling over in bed is getting harder.  Currenty is not on meds for PD   Pertinent Visual History  Lost peripheral vision in L eye   Prior level of function comment Walks everyday for about a mile. Used to have a  at a gym pre-COVID. Spends time in [place] for the [place].     Patient role/Employment history Retired   Living environment  House/[facility]   Home/Community Accessibility Comments 2 level house.     Patient/Family Goals Statement work on Balance, improve quality of gait   Fall Risk Screen   Have you fallen 2 or more times in the past year? Yes   Have you fallen and had an injury in the past year? No   Timed Up and Go score (seconds) 11.12   Is patient a fall risk? Yes   Pain   Patient currently in pain No   Cognitive Status Examination   Level of Consciousness alert   Cognitive Comment Decreased memory noted per chart review.  Wears hearing aids   Posture   Posture Comments fwd head, flexed hips, flexed knees, fwd shoulders   Range of Motion (ROM)   ROM Comment mild decreased AROM of hip musculature   Strength   Strength Comments R hip flexion 4+/5, L hip flexion 5/5, B knee extension 5/5, B dorsiflexion 5/5.  B hip abduction 4/5,  B knee flexion 4/5.    Bed Mobility   Bed Mobility Comments sit to supine 9.07 sec,  L sidelying to R sidelying 6 sec,  supine to sit 9.91 sec.     Transfer Skills   Transfer Comments Independent. Does not need UEs.     Gait   Gait Comments Ambulates wtihout an AD.  Decreased B step length, decreased B arm swing,  some difficulty on the 180 degree turns,(shuffling on the approach and some imbalance, decreased B heel toe gait pattern.   Gait Special Tests Functional Gait Assessment Score out of 30   Score out of 30 19   Comments demonstrates at increased risk for falls    Gait Special Tests Six Minute Walk Test   Feet 1245 Feet   Comments scores WNL for age/gender   Balance Special Tests Sit to Stand Reps in 30 Seconds   Reps in 30 seconds 8   Sensory Examination   Sensory Perception Comments Denies tingling and numbness   Coordination   Coordination Comments not tested   Muscle Tone   Muscle Tone no deficits were identified   Planned Therapy Interventions   Planned Therapy Interventions balance training;gait training;neuromuscular re-education;ROM;strengthening;stretching;transfer training   Clinical Impression    Criteria for Skilled Therapeutic Interventions Met yes, treatment indicated   PT Diagnosis Difficulty walking   Influenced by the following impairments decreased strength, decreased balance, decreased ROM   Functional limitations due to impairments at increased risk for falls with hx of falls   Clinical Presentation Evolving/Changing   Clinical Presentation Rationale variable fatigue/falls, FGA, 30 sec STS, TUG   Clinical Decision Making (Complexity) Moderate complexity   Predicted Duration of Therapy Intervention (days/wks) 3x week for 4 weeks within 90 days   Risk & Benefits of therapy have been explained Yes   Patient, Family & other staff in agreement with plan of care Yes   Clinical Impression Comments Educated in BIG programa and recommended OT evaluation to be a part of BIG program.    GOALS   PT Eval Goals 1   Goal 1   Goal Identifier 6MWT   Goal Description Client will be able to complete 150 ft more on 6MWT to demonstrate improved balance and gait with longer distance walking   Target Date 09/27/20   Goal 2   Goal Identifier FGA   Goal Description Client will improve balance on FGA to at least a 23/30 to help decrease risk for falls    Target Date 09/27/20   Goal 3   Goal Identifier 30 sec STS   Goal Description Client will be able to do at least 12 reps on the 30 sec STS test demontrating improved ability to transition with transfers   Target Date 09/27/20   Goal 4   Goal Identifier BIG program   Goal Description Client will be indepndent with BIG exs and understand BIG principles to incorporate with all activities   Target Date 09/27/20   Total Evaluation Time   PT Ken, Moderate Complexity Minutes (31951) 50

## 2020-07-10 ENCOUNTER — HOSPITAL ENCOUNTER (OUTPATIENT)
Dept: OCCUPATIONAL THERAPY | Facility: CLINIC | Age: 85
Setting detail: THERAPIES SERIES
End: 2020-07-10
Attending: INTERNAL MEDICINE
Payer: MEDICARE

## 2020-07-10 PROCEDURE — 97165 OT EVAL LOW COMPLEX 30 MIN: CPT | Mod: GO | Performed by: OCCUPATIONAL THERAPIST

## 2020-07-10 PROCEDURE — 97535 SELF CARE MNGMENT TRAINING: CPT | Mod: GO | Performed by: OCCUPATIONAL THERAPIST

## 2020-07-10 PROCEDURE — 97530 THERAPEUTIC ACTIVITIES: CPT | Mod: GO | Performed by: OCCUPATIONAL THERAPIST

## 2020-07-21 NOTE — PROGRESS NOTES
[facility] OP SLP Voice Evaluation (LSVT LOUD)  06/30/20 1430   General Information   Type Of Visit Initial   Start Of Care Date 06/30/20   Referring Physician Vasu Georges MD  (Neurology)   Orders Evaluate And Treat   Orders Comment Dysphonia, Big and loud program   Medical Diagnosis Primary Parkinsonism   Onset Of Illness/injury Or Date Of Surgery 06/09/20  (order date)   Precautions/Limitations  fall precautions  (also seeing PT)   Hearing Hearing loss--wears bilateral hearing aids   Surgical/Medical history reviewed Yes   Pertinent History Of Current Problem 84yo male with primary parkinsonism who is familiar to this SLP from previous speech therapy presenting with worsening voice and speech symptoms.  Pt reports that he has resumed practicing his home program exercises recently, but had not been practicing regularly otherwise.  He notes that his voice is fading, especially at the ends of sentences.  He also feels that his vocal tone has changed, with some hoarseness, reduced inflection, and some occasional slurring.  He denies difficulty with swallowing.  PMH significant for left carotid stenosis, family Hx of PD (brother, uncle, cousins), HTN, GERD, hemorrhage of gastrointestinal tract, peripheral vascular disease.   Prior Level of Functioning Previous therapy for this problem.   Prior Level Of Function Comment Pt familiar to this SLP, completing a full course of LSVT LOUD therapy from 4/24/18-6/27/18 and seen again for a refresher course from 5/9/2019-8/27/2019.   Patient Role/employment History Retired  (OB/GYN)   General Observations Pt reports that today is a typical voice day.   Patient/family Goals To improve his vocal loudness   Voice Profile during conversation, 1 min monologue and paragraph reading   Voice Quality Hoarse   Voice quality comments SPEECH: Intermittent mild-moderate roughness and breathiness.  SINGING: improved loudness compared to speech, with  increased strain at higher pitches.  THERAPY PROBES: improved voice quality and volume with cues to increase loudness.   Voice quality severity rating continuum (1=Severe, 7=WNL) 5  (CAPE-V Overall Severity: 36/100)   Breath control Tight   Breath Control comments Excessive thoracic muscle use pattern on inspiration for speech, with poor respiratory/phonatory coordination.   Breath control severity rating continuum (1=Severe, 7=WNL) 5   Voice Use / Effort Throat push   Voice Use / Effort comments Pt rates his current phonatory effort for speech as 7/10 (10 is maximum effort).   Voice use / Effort severity rating continuum (1=Severe, 7=WNL) 5   Fundamental frequency (Hz)   (Centered around B2)   Pitch /Frequency Description Monotone   Pitch / Frequency severity rating continuum (1=Severe, 7=WNL) 5   Average dB 68 dB   Volume Too quiet   Volume comments Volume for conversational speech is mildly reduced but still adequate for the setting (1:1 conversation in quiet room).  A whisper is mildly rough.  Loud phonation is clearer in quality, with no hypernasality and appropriate volume increase.   Volume severity rating continuum (1=Severe, 7=WNL) 5   Neuromuscular Control Hypokinetic   Neuromuscular Control severity rating continuum (1=Severe, 7=WNL) 5   Neuromuscular comments Consistent mildly slurred quality to speech, although speech is still intelligible.  Labial AMRs are WFL.  Lingual AMRs are intermittently irregular in rhythm.  Labial/lingual SMRs are mildly slow but regular in rhythm.   Resonance Hypernasal   Resonance severity rating continuum (1=Severe, 7=WNL) 5   Resonance comments Mild hypernasality in running speech, as well as at pitch extremes.   Comments Mild-moderate hypokinetic dysarthria and dysphonia characterized by reduced volume, roughness, breathiness, hypernasality, slurred/run together speech, monotone intonation, poor respiratory/phonatory coordination, and increased phonatory effort for speech  despite hypophonia.   Adduction /Abduction Function   Coupe de glottes per sec Unable to determine d/t run together quality   Laryngeal diadokinetic strength Weak   Laryngeal diadokinetic consistency Run together   Adduction / Abduction function comments Hypokinetic   Adduction / Abduction function scale Age 66+, norm per sec:  4   Vibratory Function of Vocal Folds   Prolonged 'ah' at mid pitch (sec) 17.2 seconds at Eb3   Prolonged 'ah' at high pitch (sec) 21.1 seconds at D4 with hypernasality and increased breathiness and roughness with prolongation   Prolonged 'ah' at low pitch (sec) 19.7 seconds at G2   Vibratory Function of Vocal Folds Scale Males 20 - 80 yrs: 15 - 25 secs   Vibratory Function of Vocal Folds Comments WFL for duration, but reduced compared to last evaluation on 5/29/19.   Compression Strength of Vocal Folds / Larynx Muscles   Vowels average volume dB 84 dB at Eb3, 78 dB at D4, 69 dB at G2   Reading aloud average volume dB 69 dB   Conversation average volume dB 68 dB   Dynamic volume range minimum dB 61 dB   Dynamic volume range maximum dB 87 dB   Efficiency of increased volume Relaxed throat   Ease / Effort of loud/quiet volumes Quiet volume easier   Function of Lengtheners / Shorteners (CT and TA Muscles)   Pitch glides Upper pitches more dysphonic  (Lowest: E2; Higest: Ab4)   General Therapy Interventions   Planned Therapy Interventions Voice   Voice LSVT generalized to community setting;Larynx strengthening;Larynx and TVF flexibility;Voice quality/pitch or volume tasks   Impressions and Recommendations   Communication Diagnosis Mild-moderate hypokinetic dysarthria and dysphonia   Summary 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  Voice and speech symptoms are characterized by reduced volume, roughness, breathiness, hypernasality, slurred/run together speech, monotone intonation, poor respiratory/phonatory coordination, and  increased phonatory effort for speech despite hypophonia.  Symptoms and objective measures of loudness and voice quality have worsened again since he was last discharged from therapy in 2019.   Recommendations A refresher course of LSVT LOUD based therapy is recommended in order to improve vocal volume, voice quality, and laryngeal strength for daily conversational speech demands.   Frequency and Duration 2x/week for 4 weeks with 1-2 monthly follow-ups   Prognosis  Good with intervention   Risks and Benefits of Treatment have been explained. Yes   Patient & /or Caregiver  in agreement with plan of care Yes   Patient Education SLP provided education regarding evaluation findings and proposed POC.  Therapy initiated today.   Educational Assessment   Barriers to Learning No barriers   Preferred Learning Style Listening;Reading;Demonstration;Pictures / Video   Voice Goals   Voice Goals 1;2   Voice Goal 1   Goal Identifier Breath   Goal Description Patient will increase vocal loudness to an average SPL of >70 dB at 30 cm during sustained /a/ phonation of at least 20 seconds duration given min cues in order to increase vocal respiratory support required for functional communication.   Target Date 09/28/20   Voice Goal 2   Goal Identifier Speech   Goal Description Patient will increase vocal loudness during narrative reading and semi-spontaneous conversation tasks to a minimum range of 70-75 dB at 30 cm given min cues in order to improve vocal loudness for daily communication tasks.   Target Date 09/28/20   Voice Goal 3   Goal Identifier Voice quality   Goal Description In a 20-minute speech task, patient will demonstrate roughness, breathiness, and hypernasality that do not exceed a level of 2 out of 10, 90% of the time by SLP judgment, so that patient is able to meet his voice quality demands.   Target Date 09/28/20   Total Session Time   Voice Minutes (70947) 30   Total Evaluation Time 40   Therapy Certification    Certification date from 06/30/20   Certification date to 09/28/20   Medical Diagnosis Primary Parkinsonism     Thank you for the referral of this patient.    Allison Alpers, B.A. (music), M.A., CCC-SLP  Speech-Language Pathologist  Certificate of Vocology  [facility]  [phone]

## 2020-07-21 NOTE — PROGRESS NOTES
[facility]          OUTPATIENT SPEECH LANGUAGE PATHOLOGY VOICE EVALUATION  PLAN OF TREATMENT FOR OUTPATIENT REHABILITATION  (COMPLETE FOR INITIAL CLAIMS ONLY)    Patient's Last Name, First Name, M.I.  YOB: 1935  Leonard Garza                        Provider s Name: [facility] Medical Record No.  [id number]     Onset Date:  6/9/2020 (order date)    Start of Care Date:  6/30/2020   Type:     ___PT  __OT   _X_SLP    Medical Diagnosis: Primary Parkinsonism   Speech Language Pathology Diagnosis:  Mild-moderate hypokinetic dysarthria and dysphonia    Visits from SOC: 1      _________________________________________________________________________________  Plan of Treatment/Functional Goals:  Voice         Goals     1. Goal Identifier: Breath       Goal Description: Patient will increase vocal loudness to an average SPL of >70 dB at 30 cm during sustained /a/ phonation of at least 20 seconds duration given min cues in order to increase vocal respiratory support required for functional communication.       Target Date: 09/28/20   2. Goal Identifier: Speech       Goal Description: Patient will increase vocal loudness during narrative reading and semi-spontaneous conversation tasks to a minimum range of 70-75 dB at 30 cm given min cues in order to improve vocal loudness for daily communication tasks.       Target Date: 09/28/20   3. Goal Identifier: Voice quality       Goal Description: In a 20-minute speech task, patient will demonstrate roughness, breathiness, and hypernasality that do not exceed a level of 2 out of 10, 90% of the time by SLP judgment, so that patient is able to meet his voice quality demands.       Target Date: 09/28/20                     Frequency and Duration: 2x/week for 4 weeks with 1-2 monthly follow-ups  Allison E. Alpers, SLP       I CERTIFY THE NEED FOR THESE  SERVICES FURNISHED UNDER        THIS PLAN OF TREATMENT AND WHILE UNDER MY CARE     (Physician co-signature of this document indicates review and certification of the therapy plan).                Certification Date From:  06/30/20  Certification Date To:  09/28/20  Referring Provider: Vasu Georges MD                 Initial Assessment        See Epic Evaluation Start of Care

## 2020-08-04 ENCOUNTER — HOSPITAL ENCOUNTER (OUTPATIENT)
Dept: OCCUPATIONAL THERAPY | Facility: CLINIC | Age: 85
Setting detail: THERAPIES SERIES
End: 2020-08-04
Attending: INTERNAL MEDICINE
Payer: MEDICARE

## 2020-08-04 PROCEDURE — 97110 THERAPEUTIC EXERCISES: CPT | Mod: GO | Performed by: OCCUPATIONAL THERAPIST

## 2020-08-05 ENCOUNTER — HOSPITAL ENCOUNTER (OUTPATIENT)
Dept: OCCUPATIONAL THERAPY | Facility: CLINIC | Age: 85
Setting detail: THERAPIES SERIES
End: 2020-08-05
Attending: INTERNAL MEDICINE
Payer: MEDICARE

## 2020-08-05 PROCEDURE — 97535 SELF CARE MNGMENT TRAINING: CPT | Mod: GO | Performed by: OCCUPATIONAL THERAPIST

## 2020-08-05 PROCEDURE — 97530 THERAPEUTIC ACTIVITIES: CPT | Mod: GO | Performed by: OCCUPATIONAL THERAPIST

## 2020-08-05 PROCEDURE — 97110 THERAPEUTIC EXERCISES: CPT | Mod: GO | Performed by: OCCUPATIONAL THERAPIST

## 2020-08-05 NOTE — PROGRESS NOTES
07/10/20 1400   Quick Adds   Quick Adds Certification   Type of Visit Initial Outpatient Occupational Therapy Evaluation   General Information   Start Of Care Date 07/10/20   Referring Physician Vasu Georges   Orders Evaluate and treat as indicated   Other Orders PT and SLP   Orders Date 06/09/20   Medical Diagnosis Primary Parkinsonism   Onset of Illness/Injury or Date of Surgery 06/09/20   Precautions/Limitations   ([place])   Special Instructions BIG and LOUD program   Surgical/Medical History Reviewed Yes   Additional Occupational Profile Info/Pertinent History of Current Problem  85 year old male developed mild change in balance, shortened stride, reduced bilateral, with it being a little worse on the L side,  arm swing, infrequent falls, hypomimia, hypophonic speech, and the reduced coordination on the left side several years ago.  Initially, he was seen by Dr. Vila from [facility] (2017).  Admitted that it is harder for him to get out of chair.  Denied any micrographia, tremor, anosmia, swallowing difficulty, or RBD.  Reported mild constipation.  According to the notes, brain MRI was negative for acute intracranial pathology, but demonstrated changes consistent with chronic small vessel ischemic disease and increased cerebral atrophy compared to 2006 study.  The presentation felt consistent with mild parkinsonism.  Tried Sinemet (2018), but developed intolerable nausea and vomiting on 100 mg dose.  Later on noticed gait changes (shuffling gait).  Went through Big and Loud Program (through neuro clinic), which was helpful. Seen at this clinic in 2017.. Reports about 4 falls in the last couple of years.  Reports slower with buttoning, writing is getting smaller, rolling over in bed is getting harder.  Currently is not on meds for PD   Role/Living Environment   Current Community Support Family/friend caregiver  (wife)   Patient role/Employment history Retired  (retired OBGYN )    Community/Avocational Activities Walks everyday for about a mile. Used to have a  at a gym pre-COVID. Spends time in [place] for the [place].  Patient reads a lot, does crosswords.   Current Living Environment House  (2 story)   Primary Bathroom Set Up/Equipment Shower stall;Shower/tub chair   Prior Level - Transfers Independent   Prior Level - Ambulation Independent   Prior Level - ADLS Independent   Prior Responsibilities - IADL Driving;Shopping;Medication management  (association)   Prior Level Comments Cleaning assistance.  Wife does the laundry, cooking and finances.  Patient drives very little.   Current Assistive Devices - Mobility   (none)   Patient/family Goals Statement To decrease falls, improve balance   Pain   Patient currently in pain No   Fall Risk Screen   Fall screen completed by PT   Cognitive Status Examination   Orientation Orientation to person, place and time   Level of Consciousness Alert   Follows Commands and Answers Questions Able to follow single-step instructions;100% of the time   Personal Safety and Judgment Intact   Memory Comments Slow processing   Cognitive Comment Patient feels his memory has been changing.  Will further assess.   Visual Perception   Visual Perception Wears glasses   Visual Perception Comments Lost peripheral vision in L eye   Posture   Posture Forward head position;Protracted shoulders   Range of Motion (ROM)   ROM Quick Adds No deficits identified   Strength   Strength No deficits were identified   Strength Comments 5/5 throughout UE's.     Hand Strength   Hand Dominance Right   Hand Strength Comments WNLs.     Coordination   Upper Extremity Coordination Left UE impaired;Right UE impaired   Left Hand, Nine Hole Peg Test (seconds) 36.03   Right Hand, Nine Hole Peg Test (seconds) 27.1   Coordination Comments B hand FMC falls greater than 2 SD below the mean for his age group.  Reports buttoning is challenging.  Writing legibility fair, not of a concern at  "this time.   Balance   Balance Comments See PT notes.  Patient with anterior lean, reports falling forwards.  Does not use an assistive device   Functional Mobility   Functional Mobility Comments Decreased L arm swing with walking.   Mobility   Bed Mobility Comments Increased time and challenge rolling side to side, min A for supine to sit.   Transfer Skill   Level of [place]: Transfers independent   Toilet Transfer   Toilet Transfer Comments Standard toilet, no concerns.   Tub/Shower Transfer   Tub/Shower Transfer Comments Worried about falling in the shower.  No grab bars but has a shower chair.     Bathing   Level of [place] - Bathing independent   Upper Body Dressing   Upper Body Dressing Comments Buttoning is challenging.  \"I have quit wearing button down shirts.\"  TIMED UE DRESSING:  [place] shirt with 5 buttons-2'26\" (does not use L hand to unbutton)   Lower Body Dressing   Lower Body Dressing Comments TIMED LE dressing (socks/shoes on/off): 3'05\"   Toileting   Level of [place]: Toilet independent   Grooming   Level of [place]: Grooming independent   Eating/Self-Feeding   Level of [place]: Eating independent   Planned Therapy Interventions   Planned Therapy Interventions ADL training;Coordination training;Neuromuscular re-education;Self care/Home management;Therapeutic activities   Adult OT Eval Goals   OT Eval Goals (Adult) 1;2;3;4;5    OT Goal 1   Goal Identifier 1-Sit to stand   Goal Description Patient will stand from a variety of chairs, with minimal UE support, without pushing legs back into chair for improved safety and decreased falls risk while transferring to standing.    Target Date 09/04/20    OT Goal 2   Goal Identifier 2-Bed mobility   Goal Description Patient will demonstrate larger UE ROM and BIG amplitude with using UEs/LEs/hips to increase independence to roll in bed   Target Date 09/04/20    OT Goal 3   Goal Identifier 3-UE dressing   Goal Description Patient to " demonstrate UE dressing including buttoning (5 buttons) in 1 minute or less to decrease time needed for AM routine.   Target Date 09/04/20   OT Goal 4   Goal Identifier 4-LE dressing   Goal Description [place] will complete LE dressing using high amplitude ROM and effort to don socks/shoes in seated postition independently in 2 minutes.   Target Date 09/04/20   OT Goal 5   Goal Identifier 5-Functional transfers   Goal Description Patient will use high amplitude ROM/effort to complete car transfer and tub/shower transfers without cues to improve safety and decrease risk for fallls.   Target Date 09/04/20   Clinical Impression   Criteria for Skilled Therapeutic Interventions Met Yes, treatment indicated   OT Diagnosis Decreased ADL independence and safety   Influenced by the following impairments decreased balance, incoordination, decreased functional mobility   Assessment of Occupational Performance 3-5 Performance Deficits   Identified Performance Deficits community mobility, ADLs, functional transfers   Clinical Decision Making (Complexity) Low complexity   Therapy Frequency 2x/week   Predicted Duration of Therapy Intervention (days/wks) 4   Risks and Benefits of Treatment have been explained. Yes   Patient, Family & other staff in agreement with plan of care Yes   Education Assessment   Barriers To Learning No Barriers   Preferred Learning Style Pictures/video;Demonstration;Reading;Listening   Therapy Certification   Certification date from 07/10/20   Certification date to 09/04/20   Total Evaluation Time   OT Eval, Low Complexity Minutes (72718) 45

## 2020-08-06 ENCOUNTER — HOSPITAL ENCOUNTER (OUTPATIENT)
Dept: PHYSICAL THERAPY | Facility: CLINIC | Age: 85
Setting detail: THERAPIES SERIES
End: 2020-08-06
Attending: INTERNAL MEDICINE
Payer: MEDICARE

## 2020-08-06 PROCEDURE — 97110 THERAPEUTIC EXERCISES: CPT | Mod: GP | Performed by: PHYSICAL THERAPIST

## 2020-08-06 PROCEDURE — 97530 THERAPEUTIC ACTIVITIES: CPT | Mod: GP | Performed by: PHYSICAL THERAPIST

## 2020-08-06 PROCEDURE — 97116 GAIT TRAINING THERAPY: CPT | Mod: GP | Performed by: PHYSICAL THERAPIST

## 2020-08-07 ENCOUNTER — HOSPITAL ENCOUNTER (OUTPATIENT)
Dept: OCCUPATIONAL THERAPY | Facility: CLINIC | Age: 85
Setting detail: THERAPIES SERIES
End: 2020-08-07
Attending: INTERNAL MEDICINE
Payer: MEDICARE

## 2020-08-07 PROCEDURE — 97110 THERAPEUTIC EXERCISES: CPT | Mod: GO | Performed by: OCCUPATIONAL THERAPIST

## 2020-08-07 PROCEDURE — 97530 THERAPEUTIC ACTIVITIES: CPT | Mod: GO | Performed by: OCCUPATIONAL THERAPIST

## 2020-08-07 PROCEDURE — 97535 SELF CARE MNGMENT TRAINING: CPT | Mod: GO | Performed by: OCCUPATIONAL THERAPIST

## 2020-08-10 ENCOUNTER — HOSPITAL ENCOUNTER (OUTPATIENT)
Dept: PHYSICAL THERAPY | Facility: CLINIC | Age: 85
Setting detail: THERAPIES SERIES
End: 2020-08-10
Attending: INTERNAL MEDICINE
Payer: MEDICARE

## 2020-08-10 PROCEDURE — 97110 THERAPEUTIC EXERCISES: CPT | Mod: GP | Performed by: PHYSICAL THERAPIST

## 2020-08-10 PROCEDURE — 97116 GAIT TRAINING THERAPY: CPT | Mod: GP | Performed by: PHYSICAL THERAPIST

## 2020-08-10 PROCEDURE — 97530 THERAPEUTIC ACTIVITIES: CPT | Mod: GP | Performed by: PHYSICAL THERAPIST

## 2020-08-10 PROCEDURE — 97112 NEUROMUSCULAR REEDUCATION: CPT | Mod: GP | Performed by: PHYSICAL THERAPIST

## 2020-08-11 ENCOUNTER — OFFICE VISIT (OUTPATIENT)
Dept: INTERNAL MEDICINE | Facility: CLINIC | Age: 85
End: 2020-08-11
Payer: MEDICARE

## 2020-08-11 ENCOUNTER — HOSPITAL ENCOUNTER (OUTPATIENT)
Dept: OCCUPATIONAL THERAPY | Facility: CLINIC | Age: 85
Setting detail: THERAPIES SERIES
End: 2020-08-11
Attending: INTERNAL MEDICINE
Payer: MEDICARE

## 2020-08-11 VITALS
SYSTOLIC BLOOD PRESSURE: 162 MMHG | DIASTOLIC BLOOD PRESSURE: 92 MMHG | OXYGEN SATURATION: 99 % | TEMPERATURE: 97.9 F | RESPIRATION RATE: 15 BRPM | BODY MASS INDEX: 28.72 KG/M2 | HEIGHT: 70 IN | WEIGHT: 200.6 LBS | HEART RATE: 66 BPM

## 2020-08-11 DIAGNOSIS — Z13.6 CARDIOVASCULAR SCREENING; LDL GOAL LESS THAN 100: ICD-10-CM

## 2020-08-11 DIAGNOSIS — I10 ESSENTIAL HYPERTENSION, BENIGN: ICD-10-CM

## 2020-08-11 DIAGNOSIS — R60.9 EDEMA, UNSPECIFIED TYPE: ICD-10-CM

## 2020-08-11 DIAGNOSIS — Z12.5 SCREENING PSA (PROSTATE SPECIFIC ANTIGEN): Primary | ICD-10-CM

## 2020-08-11 LAB
ANION GAP SERPL CALCULATED.3IONS-SCNC: 3 MMOL/L (ref 3–14)
BUN SERPL-MCNC: 17 MG/DL (ref 7–30)
CALCIUM SERPL-MCNC: 8.7 MG/DL (ref 8.5–10.1)
CHLORIDE SERPL-SCNC: 110 MMOL/L (ref 94–109)
CHOLEST SERPL-MCNC: 145 MG/DL
CO2 SERPL-SCNC: 30 MMOL/L (ref 20–32)
CREAT SERPL-MCNC: 1.3 MG/DL (ref 0.66–1.25)
GFR SERPL CREATININE-BSD FRML MDRD: 50 ML/MIN/{1.73_M2}
GLUCOSE SERPL-MCNC: 85 MG/DL (ref 70–99)
HDLC SERPL-MCNC: 63 MG/DL
LDLC SERPL CALC-MCNC: 64 MG/DL
NONHDLC SERPL-MCNC: 82 MG/DL
POTASSIUM SERPL-SCNC: 3.6 MMOL/L (ref 3.4–5.3)
SODIUM SERPL-SCNC: 143 MMOL/L (ref 133–144)
TRIGL SERPL-MCNC: 88 MG/DL
TSH SERPL DL<=0.005 MIU/L-ACNC: 2.37 MU/L (ref 0.4–4)

## 2020-08-11 PROCEDURE — 97535 SELF CARE MNGMENT TRAINING: CPT | Mod: GO | Performed by: OCCUPATIONAL THERAPIST

## 2020-08-11 PROCEDURE — 36415 COLL VENOUS BLD VENIPUNCTURE: CPT | Performed by: INTERNAL MEDICINE

## 2020-08-11 PROCEDURE — 80061 LIPID PANEL: CPT | Performed by: INTERNAL MEDICINE

## 2020-08-11 PROCEDURE — 97530 THERAPEUTIC ACTIVITIES: CPT | Mod: GO | Performed by: OCCUPATIONAL THERAPIST

## 2020-08-11 PROCEDURE — 97110 THERAPEUTIC EXERCISES: CPT | Mod: GO | Performed by: OCCUPATIONAL THERAPIST

## 2020-08-11 PROCEDURE — 84443 ASSAY THYROID STIM HORMONE: CPT | Performed by: INTERNAL MEDICINE

## 2020-08-11 PROCEDURE — 99214 OFFICE O/P EST MOD 30 MIN: CPT | Performed by: INTERNAL MEDICINE

## 2020-08-11 PROCEDURE — 80048 BASIC METABOLIC PNL TOTAL CA: CPT | Performed by: INTERNAL MEDICINE

## 2020-08-11 RX ORDER — IRBESARTAN AND HYDROCHLOROTHIAZIDE 150; 12.5 MG/1; MG/1
1 TABLET, FILM COATED ORAL DAILY
Qty: 90 TABLET | Refills: 0 | Status: SHIPPED | OUTPATIENT
Start: 2020-08-11 | End: 2020-11-03

## 2020-08-11 RX ORDER — FUROSEMIDE 20 MG
20 TABLET ORAL DAILY
Qty: 21 TABLET | Refills: 0 | Status: CANCELLED | OUTPATIENT
Start: 2020-08-11

## 2020-08-11 RX ORDER — ATORVASTATIN CALCIUM 20 MG/1
20 TABLET, FILM COATED ORAL DAILY
Qty: 90 TABLET | Refills: 3 | Status: SHIPPED | OUTPATIENT
Start: 2020-08-11 | End: 2021-09-20

## 2020-08-11 RX ORDER — ATORVASTATIN CALCIUM 20 MG/1
20 TABLET, FILM COATED ORAL DAILY
Qty: 90 TABLET | Refills: 3 | OUTPATIENT
Start: 2020-08-11

## 2020-08-11 ASSESSMENT — MIFFLIN-ST. JEOR: SCORE: 1611.17

## 2020-08-11 NOTE — PROGRESS NOTES
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  It is unclear why he decided to come in at this point but it appears that he is planning on going back to [place] after the first of the year would just like it evaluated.  He states that the edema tends to be better in the morning and worse as the day goes on.  He does not report any significant weight gain.  This edema does not appear to be associated with shortness of breath, dyspnea on exertion orthopnea or PND.  Denies any recent medication changes.  His diet is relatively benign and not excessively liberal with salt intake.  He had a liver function panel done in March of this year with a normal albumin.    Patient Active Problem List   Diagnosis     Headache     Hemorrhage of gastrointestinal tract     Essential hypertension, benign     Peripheral vascular disease (H)     Malignant basal cell neoplasm of skin     Inguinal hernia     CARDIOVASCULAR SCREENING; LDL GOAL LESS THAN 100     Health Care Home     Gastroesophageal reflux disease without esophagitis     PVC's (premature ventricular contractions)     BPH (benign prostatic hyperplasia)     Past Surgical History:   Procedure Laterality Date     CYSTOSCOPY, LITHOLAPAXY, COMBINED N/A 11/14/2016    Procedure: COMBINED CYSTOSCOPY, LITHOLAPAXY;  Surgeon: Ceasar Silverio MD;  Location:  OR     CYSTOSCOPY, TRANSURETHRAL RESECTION (TUR) PROSTATE, COMBINED N/A 11/14/2016     Procedure: COMBINED CYSTOSCOPY, TRANSURETHRAL RESECTION (TUR) PROSTATE;  Surgeon: Ceasar Silverio MD;  Location: [facility] OR     ENT SURGERY      nasal septoplasty for fx x3     GENITOURINARY SURGERY      remove testicle     HERNIORRHAPHY INGUINAL  12/11/2012    Procedure: HERNIORRHAPHY INGUINAL;  Right Inguinal Hernia Repair with Mesh, Right Hydrocelectomy;  Surgeon: Miles Redd MD;  Location: [facility] OR     HYDROCELECTOMY INGUINAL  12/11/2012    Procedure: HYDROCELECTOMY INGUINAL;;  Surgeon: Ceasar Silverio MD;  Location: [facility] OR     ORTHOPEDIC SURGERY      shoulder reduction for dislocation x3     VASCULAR SURGERY      carotid endarderectomy       Social History     Tobacco Use     Smoking status: Former Smoker     Smokeless tobacco: Never Used     Tobacco comment: quit 40 yrs ago   Substance Use Topics     Alcohol use: No     Comment: social     Family History   Problem Relation Age of Onset     Neurologic Disorder Brother         PARKINSONISM AGE 73     Hypertension Mother          Current Outpatient Medications   Medication Sig Dispense Refill     aspirin (ASPIRIN 81) 81 MG chewable tablet 81 mg       atorvastatin (LIPITOR) 20 MG tablet TAKE 1 TABLET BY MOUTH EVERY DAY 90 tablet 3     calcium-vitamin D (CALTRATE) 600-400 MG-UNIT per tablet Take 1 tablet by mouth daily        irbesartan (AVAPRO) 150 MG tablet TAKE 1 TABLET BY MOUTH EVERY DAY 90 tablet 3     ondansetron (ZOFRAN-ODT) 4 MG ODT tab Take 1 tablet (4 mg) by mouth every 8 hours as needed for nausea 12 tablet 0     Allergies   Allergen Reactions     Hay Fever & [A.R.M.]      Ragweeds      BP Readings from Last 3 Encounters:   06/09/20 110/70   05/21/19 134/84   05/17/19 136/82    Wt Readings from Last 3 Encounters:   06/09/20 88.7 kg (195 lb 9.6 oz)   05/21/19 91.9 kg (202 lb 11.2 oz)   05/17/19 90.3 kg (199 lb)           Reviewed and updated as needed this visit by Provider         Review of Systems   CONSTITUTIONAL: NEGATIVE for fever, chills, no change  "in weight  EYES: NEGATIVE for vision changes or irritation  ENT/MOUTH: NEGATIVE for ear, mouth and throat problems  RESP: NEGATIVE for significant cough or SOB  GI: NEGATIVE for nausea, abdominal pain, heartburn, or change in bowel habits  : NEGATIVE for frequency, dysuria, or hematuria  MUSCULOSKELETAL: NEGATIVE for significant arthralgias or myalgia  NEURO: NEGATIVE for weakness, dizziness or paresthesias  HEME: NEGATIVE for bleeding problems  PSYCHIATRIC: NEGATIVE for changes in mood or affect      Objective    BP (!) 162/92   Pulse 66   Temp 97.9  F (36.6  C) (Temporal)   Resp 15   Ht 1.778 m (5' 10\")   Wt 91 kg (200 lb 9.6 oz)   SpO2 99%   BMI 28.78 kg/m    Body mass index is 28.78 kg/m .  Physical Exam   GENERAL: alert and no distress  EYES: Eyes grossly normal to inspection, PERRL and conjunctivae and sclerae normal  HENT: ear canals and TM's normal, nose and mouth without ulcers or lesions  NECK: no adenopathy, no asymmetry, masses, or scars and thyroid normal to palpation  RESP: lungs clear to auscultation - no rales, rhonchi or wheezes  CV: regular rate and rhythm, normal S1 S2, no S3 or S4, no click or rub, soft systolic murmur heard, noted 1+ peripheral edema LE's to knees and peripheral pulses strong  NEURO: Normal strength and tone, mentation intact and speech normal per baseline  PSYCH: mentation appears normal, affect flat      Creatinine   Date Value Ref Range Status   05/21/2019 1.36 (H) 0.66 - 1.25 mg/dL Final     Lab Results   Component Value Date    ALT 17 07/30/2018       Assessment & Plan     1. CARDIOVASCULAR SCREENING; LDL GOAL LESS THAN 100  Patient is requesting a refill of his cholesterol panel which I have done but also asked him to obtain lipid panel today.  - atorvastatin (LIPITOR) 20 MG tablet; Take 1 tablet (20 mg) by mouth daily  Dispense: 90 tablet; Refill: 3  - Lipid Profile    2. Screening PSA (prostate specific antigen)  I offered him routine screening PSAs but he is " "not interested.    3. Edema, unspecified type  Edema appears to be dependent in nature.  Suggest support hose.  Suggest recheck basic metabolic panel, specifically creatinine as well as echocardiogram to evaluate for cardiovascular source in the setting of exam  - Basic metabolic panel, TSH reflex  - irbesartan-hydrochlorothiazide (AVALIDE) 150-12.5 MG tablet; Take 1 tablet by mouth daily  Dispense: 90 tablet; Refill: 0  - Echocardiogram Complete; Future    4. Essential hypertension, benign  Discussed with patient at the present time and consideration he would like to start low-dose hydrochlorothiazide thus we will add this to his irbesartan as this will hopefully help reduce his blood pressure at the same time helped his edema slightly.  I discussed with him the issues of using a low-dose diuretic in setting of his creatinine thus we will need to monitor closely.  A BMP will be done today.  I have also advised him to be aggressive with his fluid intake.  I have also suggested he follow-up with his blood pressure check in about a month  - irbesartan-hydrochlorothiazide (AVALIDE) 150-12.5 MG tablet; Take 1 tablet by mouth daily  Dispense: 90 tablet; Refill: 0     BMI:   Estimated body mass index is 28.07 kg/m  as calculated from the following:    Height as of 6/9/20: 1.778 m (5' 10\").    Weight as of 6/9/20: 88.7 kg (195 lb 9.6 oz).       See Patient Instructions    Return in about 1 month (around 9/11/2020) for BP Recheck, if symptoms recur or worsen.    Gian Fuller MD  [facility]  "

## 2020-08-11 NOTE — LETTER
[facility]  [address] [place] [address]  [phone]      8/11/2020       Leonard Garza  [address] [place] [id number]        Dear Leonard,    Good to see you and I will inform you of the results of your echocardiogram when they become available.    Your cholesterol looks good and I would not change anything at this point but would repeat your labs in 12 months.    Your kidney function tests are stable and improved but still slightly abnormal and should be rechecked here in the clinic in 1 month with a follow-up lab visit with me.  I will look forward to seeing you at that time and please call to make a lab appointment.  I would like to make sure we recheck and follow-up your kidney function tests since we started you on the low-dose water pill.  I will place orders in the computer that you can make your own lab appointment to get that rechecked.  It might also be a good time at that 1 month point to follow-up and recheck your blood pressure again.      Sincerely,      Gian Fuller MD  Internal Medicine

## 2020-08-12 ENCOUNTER — HOSPITAL ENCOUNTER (OUTPATIENT)
Dept: OCCUPATIONAL THERAPY | Facility: CLINIC | Age: 85
Setting detail: THERAPIES SERIES
End: 2020-08-12
Attending: INTERNAL MEDICINE
Payer: MEDICARE

## 2020-08-12 PROCEDURE — 97530 THERAPEUTIC ACTIVITIES: CPT | Mod: GO | Performed by: OCCUPATIONAL THERAPIST

## 2020-08-12 PROCEDURE — 97110 THERAPEUTIC EXERCISES: CPT | Mod: GO | Performed by: OCCUPATIONAL THERAPIST

## 2020-08-12 PROCEDURE — 97535 SELF CARE MNGMENT TRAINING: CPT | Mod: GO | Performed by: OCCUPATIONAL THERAPIST

## 2020-08-13 ENCOUNTER — HOSPITAL ENCOUNTER (OUTPATIENT)
Dept: PHYSICAL THERAPY | Facility: CLINIC | Age: 85
Setting detail: THERAPIES SERIES
End: 2020-08-13
Attending: INTERNAL MEDICINE
Payer: MEDICARE

## 2020-08-13 PROCEDURE — 97110 THERAPEUTIC EXERCISES: CPT | Mod: GP | Performed by: PHYSICAL THERAPIST

## 2020-08-13 PROCEDURE — 97112 NEUROMUSCULAR REEDUCATION: CPT | Mod: GP | Performed by: PHYSICAL THERAPIST

## 2020-08-13 PROCEDURE — 97530 THERAPEUTIC ACTIVITIES: CPT | Mod: GP | Performed by: PHYSICAL THERAPIST

## 2020-08-18 ENCOUNTER — HOSPITAL ENCOUNTER (OUTPATIENT)
Dept: PHYSICAL THERAPY | Facility: CLINIC | Age: 85
Setting detail: THERAPIES SERIES
End: 2020-08-18
Attending: INTERNAL MEDICINE
Payer: MEDICARE

## 2020-08-18 ENCOUNTER — HOSPITAL ENCOUNTER (OUTPATIENT)
Dept: SPEECH THERAPY | Facility: CLINIC | Age: 85
Setting detail: THERAPIES SERIES
End: 2020-08-18
Attending: INTERNAL MEDICINE
Payer: MEDICARE

## 2020-08-18 PROCEDURE — 97116 GAIT TRAINING THERAPY: CPT | Mod: GP | Performed by: PHYSICAL THERAPIST

## 2020-08-18 PROCEDURE — 97112 NEUROMUSCULAR REEDUCATION: CPT | Mod: GP | Performed by: PHYSICAL THERAPIST

## 2020-08-18 PROCEDURE — 92507 TX SP LANG VOICE COMM INDIV: CPT | Mod: GN | Performed by: STUDENT IN AN ORGANIZED HEALTH CARE EDUCATION/TRAINING PROGRAM

## 2020-08-19 ENCOUNTER — HOSPITAL ENCOUNTER (OUTPATIENT)
Dept: SPEECH THERAPY | Facility: CLINIC | Age: 85
Setting detail: THERAPIES SERIES
End: 2020-08-19
Attending: INTERNAL MEDICINE
Payer: MEDICARE

## 2020-08-19 ENCOUNTER — HOSPITAL ENCOUNTER (OUTPATIENT)
Dept: OCCUPATIONAL THERAPY | Facility: CLINIC | Age: 85
Setting detail: THERAPIES SERIES
End: 2020-08-19
Attending: INTERNAL MEDICINE
Payer: MEDICARE

## 2020-08-19 PROCEDURE — 97110 THERAPEUTIC EXERCISES: CPT | Mod: GO | Performed by: OCCUPATIONAL THERAPIST

## 2020-08-19 PROCEDURE — 92507 TX SP LANG VOICE COMM INDIV: CPT | Mod: GN | Performed by: SPEECH-LANGUAGE PATHOLOGIST

## 2020-08-19 PROCEDURE — 97530 THERAPEUTIC ACTIVITIES: CPT | Mod: GO | Performed by: OCCUPATIONAL THERAPIST

## 2020-08-19 PROCEDURE — 97535 SELF CARE MNGMENT TRAINING: CPT | Mod: GO | Performed by: OCCUPATIONAL THERAPIST

## 2020-08-20 ENCOUNTER — HOSPITAL ENCOUNTER (OUTPATIENT)
Dept: PHYSICAL THERAPY | Facility: CLINIC | Age: 85
Setting detail: THERAPIES SERIES
End: 2020-08-20
Attending: INTERNAL MEDICINE
Payer: MEDICARE

## 2020-08-20 ENCOUNTER — HOSPITAL ENCOUNTER (OUTPATIENT)
Dept: CARDIOLOGY | Facility: CLINIC | Age: 85
Discharge: HOME | End: 2020-08-20
Attending: INTERNAL MEDICINE | Admitting: INTERNAL MEDICINE
Payer: MEDICARE

## 2020-08-20 DIAGNOSIS — R60.9 EDEMA, UNSPECIFIED TYPE: ICD-10-CM

## 2020-08-20 PROCEDURE — 97112 NEUROMUSCULAR REEDUCATION: CPT | Mod: GP | Performed by: PHYSICAL THERAPIST

## 2020-08-20 PROCEDURE — 93306 TTE W/DOPPLER COMPLETE: CPT | Mod: 26 | Performed by: INTERNAL MEDICINE

## 2020-08-20 PROCEDURE — 97116 GAIT TRAINING THERAPY: CPT | Mod: GP | Performed by: PHYSICAL THERAPIST

## 2020-08-20 PROCEDURE — 93306 TTE W/DOPPLER COMPLETE: CPT

## 2020-08-20 PROCEDURE — 97110 THERAPEUTIC EXERCISES: CPT | Mod: GP | Performed by: PHYSICAL THERAPIST

## 2020-08-21 ENCOUNTER — TELEPHONE (OUTPATIENT)
Dept: INTERNAL MEDICINE | Facility: CLINIC | Age: 85
End: 2020-08-21

## 2020-08-21 NOTE — TELEPHONE ENCOUNTER
Reason for Call:  Other call back    Detailed comments: Pt returned a call.  It may be about results for an echo he had on 8/20/20 at [facility].    Phone Number Patient can be reached at: Home number on file [phone] (home)    Best Time: anytime    Can we leave a detailed message on this number? YES    Call taken on 8/21/2020 at 1:11 PM by BENIGNO DRAKE

## 2020-08-24 ENCOUNTER — HOSPITAL ENCOUNTER (OUTPATIENT)
Dept: PHYSICAL THERAPY | Facility: CLINIC | Age: 85
Setting detail: THERAPIES SERIES
End: 2020-08-24
Attending: INTERNAL MEDICINE
Payer: MEDICARE

## 2020-08-24 PROCEDURE — 97110 THERAPEUTIC EXERCISES: CPT | Mod: GP | Performed by: PHYSICAL THERAPIST

## 2020-08-24 PROCEDURE — 97116 GAIT TRAINING THERAPY: CPT | Mod: GP | Performed by: PHYSICAL THERAPIST

## 2020-08-25 ENCOUNTER — HOSPITAL ENCOUNTER (OUTPATIENT)
Dept: PHYSICAL THERAPY | Facility: CLINIC | Age: 85
Setting detail: THERAPIES SERIES
End: 2020-08-25
Attending: INTERNAL MEDICINE
Payer: MEDICARE

## 2020-08-25 PROCEDURE — 97116 GAIT TRAINING THERAPY: CPT | Mod: GP | Performed by: PHYSICAL THERAPIST

## 2020-08-25 PROCEDURE — 97112 NEUROMUSCULAR REEDUCATION: CPT | Mod: GP | Performed by: PHYSICAL THERAPIST

## 2020-08-26 ENCOUNTER — OFFICE VISIT (OUTPATIENT)
Dept: INTERNAL MEDICINE | Facility: CLINIC | Age: 85
End: 2020-08-26
Payer: MEDICARE

## 2020-08-26 ENCOUNTER — HOSPITAL ENCOUNTER (OUTPATIENT)
Dept: OCCUPATIONAL THERAPY | Facility: CLINIC | Age: 85
Setting detail: THERAPIES SERIES
End: 2020-08-26
Attending: INTERNAL MEDICINE
Payer: MEDICARE

## 2020-08-26 VITALS
BODY MASS INDEX: 28.14 KG/M2 | WEIGHT: 196.1 LBS | OXYGEN SATURATION: 100 % | DIASTOLIC BLOOD PRESSURE: 78 MMHG | TEMPERATURE: 98.3 F | RESPIRATION RATE: 13 BRPM | SYSTOLIC BLOOD PRESSURE: 124 MMHG | HEART RATE: 76 BPM

## 2020-08-26 DIAGNOSIS — R60.9 EDEMA, UNSPECIFIED TYPE: Primary | ICD-10-CM

## 2020-08-26 DIAGNOSIS — I10 ESSENTIAL HYPERTENSION, BENIGN: ICD-10-CM

## 2020-08-26 PROCEDURE — 97530 THERAPEUTIC ACTIVITIES: CPT | Mod: GO | Performed by: OCCUPATIONAL THERAPIST

## 2020-08-26 PROCEDURE — 97110 THERAPEUTIC EXERCISES: CPT | Mod: GO | Performed by: OCCUPATIONAL THERAPIST

## 2020-08-26 PROCEDURE — 99213 OFFICE O/P EST LOW 20 MIN: CPT | Performed by: INTERNAL MEDICINE

## 2020-08-26 PROCEDURE — 97535 SELF CARE MNGMENT TRAINING: CPT | Mod: GO | Performed by: OCCUPATIONAL THERAPIST

## 2020-08-26 NOTE — PROGRESS NOTES
Subjective     Leonard Garza is a 85 year old male who presents to clinic today for the following health issues:    HPI     Follow up results of 8/20/20 echocardiogram     Hypertension Follow-up      Do you check your blood pressure regularly outside of the clinic? No     Are you following a low salt diet? Yes    Are your blood pressures ever more than 140 on the top number (systolic) OR more   than 90 on the bottom number (diastolic), for example 140/90? N/a     Review of Systems   CONSTITUTIONAL: NEGATIVE for fever, chills, change in weight  EYES: NEGATIVE for vision changes or irritation  ENT/MOUTH: NEGATIVE for ear, mouth and throat problems  RESP: NEGATIVE for significant cough or SOB  CV: NEGATIVE for chest pain, palpitations or peripheral edema improved  GI: NEGATIVE for nausea, abdominal pain, heartburn, or change in bowel habits  : NEGATIVE for frequency, dysuria, or hematuria  MUSCULOSKELETAL: NEGATIVE for significant arthralgias or myalgia  NEURO: NEGATIVE for weakness, dizziness or paresthesias  HEME: NEGATIVE for bleeding problems  PSYCHIATRIC: NEGATIVE for changes in mood or affect      Objective    /78 (BP Location: Left arm, Patient Position: Chair)   Pulse 76   Temp 98.3  F (36.8  C)   Resp 13   Wt 89 kg (196 lb 1.6 oz)   SpO2 100%   BMI 28.14 kg/m    Body mass index is 28.14 kg/m .  Physical Exam   GENERAL: alert and no distress  EYES: Eyes grossly normal to inspection, PERRL and conjunctivae and sclerae normal  HENT: ear canals and TM's normal, nose and mouth without ulcers or lesions  NECK: no adenopathy, no asymmetry, masses, or scars and thyroid normal to palpation  RESP: lungs clear to auscultation - no rales, rhonchi or wheezes  CV: regular rate and rhythm, normal S1 S2, no S3 or S4, no click or rub, no peripheral edema and peripheral pulses strong  MS: no gross musculoskeletal defects noted, no edema  NEURO: Normal strength and tone, mentation intact and speech slightly  slowed and soft.  PSYCH: mentation appears normal, affect normal/bright        Assessment & Plan     Edema, unspecified type  Currently improved with stable blood pressure and use of support hose plus low-dose diuretic therapy.  Advise follow-up with routine basic metabolic panel as ordered    Essential hypertension, benign  Stable on therapy continue with current medical management    See Patient Instructions, patient was again advised to update tetanus booster as well as shingles vaccination    Return in about 2 weeks (around 9/9/2020) for Lab Work appointment.    Gian Fuller MD  [facility]

## 2020-09-01 ENCOUNTER — HOSPITAL ENCOUNTER (OUTPATIENT)
Dept: SPEECH THERAPY | Facility: CLINIC | Age: 85
Setting detail: THERAPIES SERIES
End: 2020-09-01
Attending: INTERNAL MEDICINE
Payer: MEDICARE

## 2020-09-01 PROCEDURE — 92507 TX SP LANG VOICE COMM INDIV: CPT | Mod: GN | Performed by: STUDENT IN AN ORGANIZED HEALTH CARE EDUCATION/TRAINING PROGRAM

## 2020-09-08 ENCOUNTER — HOSPITAL ENCOUNTER (OUTPATIENT)
Dept: SPEECH THERAPY | Facility: CLINIC | Age: 85
Setting detail: THERAPIES SERIES
End: 2020-09-08
Attending: INTERNAL MEDICINE
Payer: MEDICARE

## 2020-09-08 PROCEDURE — 92507 TX SP LANG VOICE COMM INDIV: CPT | Mod: GN | Performed by: STUDENT IN AN ORGANIZED HEALTH CARE EDUCATION/TRAINING PROGRAM

## 2020-09-10 ENCOUNTER — HOSPITAL ENCOUNTER (OUTPATIENT)
Dept: SPEECH THERAPY | Facility: CLINIC | Age: 85
Setting detail: THERAPIES SERIES
End: 2020-09-10
Attending: INTERNAL MEDICINE
Payer: MEDICARE

## 2020-09-10 PROCEDURE — 92507 TX SP LANG VOICE COMM INDIV: CPT | Mod: GN | Performed by: STUDENT IN AN ORGANIZED HEALTH CARE EDUCATION/TRAINING PROGRAM

## 2020-09-15 ENCOUNTER — HOSPITAL ENCOUNTER (OUTPATIENT)
Dept: OCCUPATIONAL THERAPY | Facility: CLINIC | Age: 85
Setting detail: THERAPIES SERIES
End: 2020-09-15
Attending: INTERNAL MEDICINE
Payer: MEDICARE

## 2020-09-15 PROCEDURE — 97110 THERAPEUTIC EXERCISES: CPT | Mod: GO | Performed by: OCCUPATIONAL THERAPIST

## 2020-09-15 PROCEDURE — 97530 THERAPEUTIC ACTIVITIES: CPT | Mod: GO | Performed by: OCCUPATIONAL THERAPIST

## 2020-09-22 ENCOUNTER — HOSPITAL ENCOUNTER (OUTPATIENT)
Dept: OCCUPATIONAL THERAPY | Facility: CLINIC | Age: 85
Setting detail: THERAPIES SERIES
End: 2020-09-22
Attending: INTERNAL MEDICINE
Payer: MEDICARE

## 2020-09-22 PROCEDURE — 97530 THERAPEUTIC ACTIVITIES: CPT | Mod: GO | Performed by: OCCUPATIONAL THERAPIST

## 2020-09-22 PROCEDURE — 97535 SELF CARE MNGMENT TRAINING: CPT | Mod: GO | Performed by: OCCUPATIONAL THERAPIST

## 2020-09-22 NOTE — PROGRESS NOTES
"Outpatient Occupational Therapy Discharge Note     Patient: Leonard Garza  : 1935    Beginning/End Dates of Reporting Period:  7/10/2020 to 2020    Referring Provider: Vasu Lerma Diagnosis: Primary Parkinsonism     Client Self Report: \"I feel a bit more limber.\"  Patient does not feel his balance has gotten much better.  \"My posture is better but i have to think about it.  It it not automatic.\"     Objective Measurements:     Objective Measure: MOCA   Details: 24 (normal is 26+)     Objective Measure: Timed LE dressing (socks and shoes on/off)   Details: 29\" (improved from 3'05\" on evaluation)     Objective Measure: TIMED UE DRESSING:  [place] shirt with 5 buttons   Details: 50\"(improved from 2'\")     Objective Measure: Sit to Stand (30 seconds)   Details: 12 (improved from 8)      Goals:     Goal Identifier 1-Sit to stand   Goal Description Patient will stand from a variety of chairs, with minimal UE support, without pushing legs back into chair for improved safety and decreased falls risk while transferring to standing.    Target Date 20   Date Met  20   Progress:     Goal Identifier 2-Bed mobility   Goal Description Patient will demonstrate larger UE ROM and BIG amplitude with using UEs/LEs/hips to increase independence to roll in bed   Target Date 20   Date Met  20   Progress:     Goal Identifier 3-UE dressing   Goal Description Patient to demonstrate UE dressing including buttoning (5 buttons) in 1 minute or less to decrease time needed for AM routine.   Target Date 20   Date Met  20   Progress:     Goal Identifier 4-LE dressing   Goal Description Paient will complete LE dressing using high amplitude ROM and effort to don socks/shoes in seated postition independently in 2 minutes.   Target Date 20   Date Met  20   Progress:     Goal Identifier 5-Functional transfers   Goal Description Patient will use high amplitude " ROM/effort to complete car transfer and tub/shower transfers without cues to improve safety and decrease risk for fallls.   Target Date 09/04/20   Date Met   9/22/20   Progress:        Progress Toward Goals:   Progress this reporting period: Patient was seen for a total of 10 OP OT sessions (16 total treatment sessions in combination with PT) for the full LSVT-BIG program.  Patient has improved speed, amplitude for both UE and LE dressing but continues to demonstrate poor balance in standing and side stepping.  He continues to need cues to calibrate movement outside of therapy for posture and picking his feet up when walking.  He reports 2 falls in the past month. Patient educated on the importance of keeping up with exercises after discharge and recommend a follow up/refresher in 6 months.    Plan:  Discharge from therapy.    Discharge:    Reason for Discharge: Patient has met all goals.    Equipment Issued: LSVT BIG HEP    Discharge Plan: Patient to continue home program.

## 2020-10-06 ENCOUNTER — HOSPITAL ENCOUNTER (OUTPATIENT)
Dept: SPEECH THERAPY | Facility: CLINIC | Age: 85
Setting detail: THERAPIES SERIES
End: 2020-10-06
Attending: INTERNAL MEDICINE
Payer: MEDICARE

## 2020-10-06 PROCEDURE — 92507 TX SP LANG VOICE COMM INDIV: CPT | Mod: GN | Performed by: STUDENT IN AN ORGANIZED HEALTH CARE EDUCATION/TRAINING PROGRAM

## 2020-10-06 NOTE — PROGRESS NOTES
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     Objective Measurements:      Pt demonstrating a clear voice quality for at least 90% of the session, with mild breathiness and roughness observed at the end of the session as his voice fatigued.    Pt completed the previously trained LSVT LOUD daily tasks when given no-min cues/models from SLP.  Pt completed the maximum sustained /a/ x15 with an average 83 dB and 23.2 seconds duration.  Pt completed the maximum fundamental frequency tasks (High/Low) x15 each in a range of A2-Ab3 while maintaining loudness >70 dB.  Pt read aloud his 10 functional phrases x5 with an average 76 dB given no-min cues.  Given no-min cues to use the loud technique, pt participated in semi-spontaneous conversation with SLP=72 dB and read aloud a magazine article=71 dB.  SLP provided education regarding an optimal home regimen of practice of therapy exercises upon discharge, and pt verbalized understanding.     Goals:  Goal Identifier Breath   Goal Description Patient will increase vocal loudness to an average SPL of >70 dB at 30 cm during sustained /a/ phonation of at least 20 seconds duration given min cues in order to increase vocal respiratory support required for functional communication.   Target Date 20   Date Met  10/06/20   Progress:  Good, goal met per objective measures above.     Goal Identifier Speech   Goal Description Patient will increase vocal loudness during narrative reading and semi-spontaneous conversation tasks to a minimum range of 70-75 dB at 30 cm given min  "cues in order to improve vocal loudness for daily communication tasks.   Target Date 09/28/20   Date Met  10/06/20   Progress:  Good, goal met per objective measures above.     Goal Identifier Voice quality   Goal Description In a 20-minute speech task, patient will demonstrate roughness, breathiness, and hypernasality that do not exceed a level of 2 out of 10, 90% of the time by SLP judgment, so that patient is able to meet his voice quality demands.   Target Date 09/28/20   Date Met  10/06/20   Progress:  Good, goal met per objective measures above.     Progress Toward Goals:    Progress this reporting period: Patient has demonstrating consistent improvement in vocal volume and voice quality throughout this \"refresher\" course of speech therapy utilizing exercises and concepts from the LSVT LOUD program.  Patient has previously completed the full program with this SLP.  Patient feels that his voice is stronger again with intentional use of the loud technique in his daily conversations, as well as a regular home practice of therapy exercises.  Patient is currently demonstrating WNL volume and voice quality for at least 90% of the time in his therapy sessions, although he continues to demonstrate some vocal fatigue at the end of sessions.  Patient is motivated to continue with a regular home exercise regimen of therapy exercises, and will return for another \"refresher\" course of skilled speech therapy if his symptoms worsen.    Plan:  Discharge from therapy.    Discharge:    Reason for Discharge: Patient has met all goals.    Discharge Plan: Patient to continue home program.  Return for \"refresher\" course of therapy PRN if symptoms worsen.    Allison Alpers, B.A. (PERLA simmons, CCC-SLP  Speech-Language Pathologist  Certificate of Vocology  [facility]  [phone]      "

## 2020-10-07 NOTE — PROGRESS NOTES
[facility]          OUTPATIENT OCCUPATIONAL THERAPY  EVALUATION  PLAN OF TREATMENT FOR OUTPATIENT REHABILITATION  (COMPLETE FOR INITIAL CLAIMS ONLY)  Patient's Last Name, First Name, M.I.  YOB: 1935  Leonard Garza                        Provider's Name  [facility] Medical Record No.  [id number]                               Onset Date:     06/09/20   Start of Care Date:     07/10/20   Type:     ___PT   _X_OT   ___SLP Medical Diagnosis:     Primary Parkinsonism                          OT Diagnosis:     Decreased ADL independence and safety Visits from SOC:  1   _________________________________________________________________________________  Plan of Treatment/Functional Goals:  ADL training, Coordination training, Neuromuscular re-education, Self care/Home management, Therapeutic activities                    Goals  Goal Identifier: 1-Sit to stand  Goal Description: Patient will stand from a variety of chairs, with minimal UE support, without pushing legs back into chair for improved safety and decreased falls risk while transferring to standing.   Target Date: 09/04/20     Goal Identifier: 2-Bed mobility  Goal Description: Patient will demonstrate larger UE ROM and BIG amplitude with using UEs/LEs/hips to increase independence to roll in bed  Target Date: 09/04/20     Goal Identifier: 3-UE dressing  Goal Description: Patient to demonstrate UE dressing including buttoning (5 buttons) in 1 minute or less to decrease time needed for AM routine.  Target Date: 09/04/20     Goal Identifier: 4-LE dressing  Goal Description: Paient will complete LE dressing using high amplitude ROM and effort to don socks/shoes in seated postition independently in 2 minutes.  Target Date: 09/04/20     Goal Identifier: 5-Functional transfers  Goal Description: Patient will use high amplitude  ROM/effort to complete car transfer and tub/shower transfers without cues to improve safety and decrease risk for fallls.  Target Date: 09/04/20                                              Therapy Frequency: 2x/week     Predicted Duration of Therapy Intervention (days/wks): 4  Janice Chavarria OT          I CERTIFY THE NEED FOR THESE SERVICES FURNISHED UNDER        THIS PLAN OF TREATMENT AND WHILE UNDER MY CARE     (Physician co-signature of this document indicates review and certification of the therapy plan).                  Certification date from: 07/10/20, Certification date to: 09/04/20               Referring Physician: Vasu Georges     Initial Assessment        See Epic Evaluation      Start Of Care Date: 07/10/20

## 2020-10-28 ENCOUNTER — TRANSFERRED RECORDS (OUTPATIENT)
Dept: HEALTH INFORMATION MANAGEMENT | Facility: CLINIC | Age: 85
End: 2020-10-28

## 2020-11-01 DIAGNOSIS — R60.9 EDEMA, UNSPECIFIED TYPE: ICD-10-CM

## 2020-11-01 DIAGNOSIS — I10 ESSENTIAL HYPERTENSION, BENIGN: ICD-10-CM

## 2020-11-03 RX ORDER — IRBESARTAN AND HYDROCHLOROTHIAZIDE 150; 12.5 MG/1; MG/1
TABLET, FILM COATED ORAL
Qty: 90 TABLET | Refills: 0 | Status: SHIPPED | OUTPATIENT
Start: 2020-11-03 | End: 2021-02-15

## 2020-12-10 ENCOUNTER — OFFICE VISIT (OUTPATIENT)
Dept: NEUROSURGERY | Facility: CLINIC | Age: 85
End: 2020-12-10
Attending: PSYCHIATRY & NEUROLOGY
Payer: MEDICARE

## 2020-12-10 VITALS
HEART RATE: 76 BPM | SYSTOLIC BLOOD PRESSURE: 102 MMHG | OXYGEN SATURATION: 100 % | DIASTOLIC BLOOD PRESSURE: 74 MMHG | WEIGHT: 198 LBS | BODY MASS INDEX: 28.41 KG/M2

## 2020-12-10 DIAGNOSIS — G21.4 VASCULAR PARKINSONISM (H): Primary | ICD-10-CM

## 2020-12-10 PROCEDURE — 99215 OFFICE O/P EST HI 40 MIN: CPT | Performed by: PSYCHIATRY & NEUROLOGY

## 2020-12-10 NOTE — PROGRESS NOTES
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 Please refer to my initial/other prior notes for further information.  The patient is accompanied by his wife today.    Since the last visit, the patient reports that he is doing good.  He completed [employer] Program that was beneficial.  He is not on any medications currently.  Previous trial of Sinemet resulted in intolerable nausea.  No other medications were tried.    According to patient's wife, the patient has minor cognitive difficulties that she noticed.  He is unable to learn how to use computer efficiently and has mild word finding difficulties as well as difficulty remembering names.  When he is tired, he has noticeably stooped forward posture, but is able to correct himself when she reminds him.  He had 1 fall approximately 2 weeks ago when he got distracted while walking because the payment was uneven and he turned his head to the side to wave somebody.  No other safety concerns.    On review of systems, patient endorses no additional active complaints. Medications, allergies, family and social history were also reviewed.  Planning to spend winter in [place] leaving in the beginning of January 2021.  Will back in May 2021.  There are no other changes reported by patient.  REVIEW OF SYSTEMS:                                                    10-system review was completed. Pertinent positives are included in HPI. The remainder of ROS is negative.  EXAM:                                                    Physical Exam:   Vitals: /74    Pulse 76   Wt 198 lb (89.8 kg)   SpO2 100%   BMI 28.41 kg/m      General: pt is in NAD, cooperative.  Skin: normal turgor, moist mucous membranes, no lesions/rashes noticed.  HEENT: ATNC, white sclera, normal conjunctiva.  Respiratory: Symmetric lung excursion, no accessory respiratory muscle use.  Abdomen: Non distended.  Neurological: awake, cooperative, follows commands, no aphasia or dysarthria noted, cranial nerves II-XII: Mild hypomimia and hypophonia, hearing is reduced bilaterally, equally moves all extremities, no tremor, mild bradykinesia, muscle tone increases with provoking maneuvers in both hands, no dysmetria bilaterally, mildly stooped forward posture, slightly reduced stride length and arm swings.  Pull test is negative.  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  The additional differential diagnosis includes Parkinson-plus conditions, though the suspicion is low given his normal recent DaTSCAN.  Previously we reviewed available treatment options, but the patient was not interested in starting a new medication.  I do not see any significant exam changes today.  Wife reports mild cognitive changes that we will investigate at the next time since they are leaving to [place] in a couple of weeks for 4 months.  We will plan to do MoCA at the next visit.  If abnormal, will proceed with labs, CPT, and neuropsychologic evaluation.    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    I am pleased to see that his symptoms are stable.  We decided not to start any new medications.     We will plan to do cognitive assessment at the next visit.     Next follow-up appointment is in the next 6 months or earlier if needed.    Total Time: 45 minutes with  > 50% spent counseling the patient and his wife on stated above assessment and recommendations.    Vasu Georges MD  HP/ Neurology

## 2020-12-10 NOTE — LETTER
12/10/2020         RE: Leonard Garza  [address] [facility] [address]        Dear Colleague,    Thank you for referring your patient, Leonard Garaz, to the [facility]IC [place]. Please see a copy of my visit note below.    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 Please refer to my initial/other prior notes for further information.  The patient is accompanied by his wife today.    Since the last visit, the patient reports that he is doing good.  He completed [employer] Program that was beneficial.  He is not on any medications currently.  Previous trial of Sinemet resulted in intolerable nausea.  No other medications were tried.    According to patient's wife, the patient has minor cognitive difficulties that she noticed.  He is unable to learn how to use computer efficiently and has mild word finding difficulties as well as difficulty remembering names.  When he is tired, he has noticeably stooped forward posture, but is able to correct himself when she reminds him.  He had 1 fall approximately 2 weeks ago when he got distracted while walking because the payment was uneven and he turned his head to the side to wave somebody.  No other safety concerns.    On review of systems, patient endorses no additional active complaints. Medications, allergies, family and social history were also reviewed.  Planning to spend winter in [place] leaving in the beginning of January 2021.  Will back in May 2021.  There are no other changes reported by patient.  REVIEW OF  SYSTEMS:                                                    10-system review was completed. Pertinent positives are included in HPI. The remainder of ROS is negative.  EXAM:                                                    Physical Exam:   Vitals: /74   Pulse 76   Wt 198 lb (89.8 kg)   SpO2 100%   BMI 28.41 kg/m      General: pt is in NAD, cooperative.  Skin: normal turgor, moist mucous membranes, no lesions/rashes noticed.  HEENT: ATNC, white sclera, normal conjunctiva.  Respiratory: Symmetric lung excursion, no accessory respiratory muscle use.  Abdomen: Non distended.  Neurological: awake, cooperative, follows commands, no aphasia or dysarthria noted, cranial nerves II-XII: Mild hypomimia and hypophonia, hearing is reduced bilaterally, equally moves all extremities, no tremor, mild bradykinesia, muscle tone increases with provoking maneuvers in both hands, no dysmetria bilaterally, mildly stooped forward posture, slightly reduced stride length and arm swings.  Pull test is negative.  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  The additional differential diagnosis includes Parkinson-plus conditions, though the suspicion is low given his normal recent DaTSCAN.  Previously we reviewed available treatment options, but the patient was not interested in starting a new medication.  I do not see any significant exam changes today.  Wife reports mild cognitive changes that we will investigate at the next time since they are leaving to [place] in a couple of weeks for 4 months.  We will plan to do MoCA at the next visit.  If abnormal, will proceed with labs, CPT, and neuropsychologic evaluation.    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    I am  pleased to see that his symptoms are stable.  We decided not to start any new medications.     We will plan to do cognitive assessment at the next visit.     Next follow-up appointment is in the next 6 months or earlier if needed.    Total Time: 45 minutes with > 50% spent counseling the patient and his wife on stated above assessment and recommendations.    Vasu Georges MD  / Neurology        Again, thank you for allowing me to participate in the care of your patient.        Sincerely,        Vasu Georges MD

## 2020-12-10 NOTE — PATIENT INSTRUCTIONS
AFTER VISIT SUMMARY (AVS):    At today's visit we thoroughly discussed current symptoms, available treatment options, and the plan.    I am pleased to see that your symptoms are stable.  We decided not to start any new medications.     We will plan to do cognitive assessment at next visit.     Next follow-up appointment is in the next 6 months or earlier if needed.    Please do not hesitate to call me with any questions or concerns.    Thanks.

## 2021-02-12 DIAGNOSIS — I10 ESSENTIAL HYPERTENSION, BENIGN: ICD-10-CM

## 2021-02-12 DIAGNOSIS — R60.9 EDEMA, UNSPECIFIED TYPE: ICD-10-CM

## 2021-02-15 RX ORDER — IRBESARTAN AND HYDROCHLOROTHIAZIDE 150; 12.5 MG/1; MG/1
TABLET, FILM COATED ORAL
Qty: 90 TABLET | Refills: 0 | Status: SHIPPED | OUTPATIENT
Start: 2021-02-15 | End: 2021-06-01

## 2021-02-15 NOTE — TELEPHONE ENCOUNTER
Routing refill request to provider for review/approval because:  Labs out of range:  Creatinine     Creatinine   Date Value Ref Range Status   08/11/2020 1.30 (H) 0.66 - 1.25 mg/dL Final

## 2021-05-07 ENCOUNTER — MEDICAL CORRESPONDENCE (OUTPATIENT)
Dept: HEALTH INFORMATION MANAGEMENT | Facility: CLINIC | Age: 86
End: 2021-05-07

## 2021-05-29 DIAGNOSIS — R60.9 EDEMA, UNSPECIFIED TYPE: ICD-10-CM

## 2021-05-29 DIAGNOSIS — I10 ESSENTIAL HYPERTENSION, BENIGN: ICD-10-CM

## 2021-06-01 RX ORDER — IRBESARTAN AND HYDROCHLOROTHIAZIDE 150; 12.5 MG/1; MG/1
TABLET, FILM COATED ORAL
Qty: 90 TABLET | Refills: 0 | Status: SHIPPED | OUTPATIENT
Start: 2021-06-01 | End: 2021-09-16

## 2021-06-23 ENCOUNTER — HOSPITAL ENCOUNTER (OUTPATIENT)
Dept: PHYSICAL THERAPY | Facility: CLINIC | Age: 86
Setting detail: THERAPIES SERIES
End: 2021-06-23
Attending: INTERNAL MEDICINE
Payer: MEDICARE

## 2021-06-23 PROCEDURE — 97110 THERAPEUTIC EXERCISES: CPT | Mod: GP | Performed by: PHYSICAL THERAPIST

## 2021-06-23 PROCEDURE — 97162 PT EVAL MOD COMPLEX 30 MIN: CPT | Mod: GP,KX | Performed by: PHYSICAL THERAPIST

## 2021-06-23 ASSESSMENT — 6 MINUTE WALK TEST (6MWT): TOTAL DISTANCE WALKED (FT): 1295

## 2021-06-23 NOTE — PROGRESS NOTES
06/23/21 1200   Quick Adds   Quick Adds Certification   Type of Visit Initial OP PT Evaluation   General Information   Start of Care Date 06/23/21   Referring Physician Holden Blanco   Orders Evaluate and Treat as Indicated   Order Date 05/20/21   Medical Diagnosis [facility] s dementia   Onset of illness/injury or Date of Surgery 05/20/21  (order date)   Surgical/Medical history reviewed Yes   Pertinent history of current problem (include personal factors and/or comorbidities that impact the POC) Leonard returns to skilled PT services to address mobility deficits secondary to Parkinson's. Was seen at this clinic in 2017 and 2020. He reports that his balance feels a bit more off. Reports almost falling this morning but was able to stabilize. Is not on medication for parkinson's at this time. Is trying to walk for exercise. Has a  at the gym as well for strength training.   Patient role/Employment history Retired   Living environment House[facility]   Home/Community Accessibility Comments 2 level house.     Patient/Family Goals Statement Improve balance and walking. Feels that his walking is slower.    Fall Risk Screen   Fall screen completed by PT   Have you fallen 2 or more times in the past year? Yes   Have you fallen and had an injury in the past year? No   Timed Up and Go score (seconds) 9.35   Is patient a fall risk? Yes   Fall screen comments per FGA   Pain   Patient currently in pain No   Cognitive Status Examination   Orientation orientation to person, place and time   Integumentary   Integumentary No deficits were identified   Posture   Posture Comments forward at waist indicating tightness in hip flexors   Strength   Strength Comments LEs 5/5 with MMT but demonstrates functinonal weakness with sit to stand   Transfer Skills   Transfer Comments Lg without use of UEs but tends to have weight in heels   Gait   Gait Comments Ambulates wihtout AD, decreased stride length and foot clearance  bilaterally. Occasional shuffling of steps especially when he increases speed. Decreased arm extension during swing.   Gait Special Tests   Gait Special Tests 25 FOOT TIMED WALK   Gait Special Tests 25 Foot Timed Walk   Seconds 7   Steps 13 Steps   Gait Special Tests Functional Gait Assessment Score out of 30   Score out of 30 22   Gait Special Tests Six Minute Walk Test   Feet 1295 Feet   Comments rates fatigue 4-5/10 on omni   Balance Special Tests Modified CTSIB Conditions   Condition 1, seconds 30 Seconds   Condition 2, seconds 30 Seconds   Condition 4, seconds 30 Seconds   Condition 5, seconds 23 Seconds   Balance Special Tests Sit to Stand Reps in 30 Seconds   Reps in 30 seconds 7   Sensory Examination   Sensory Perception Comments reports mild numbness in his feet, no change   Planned Therapy Interventions   Planned Therapy Interventions balance training;gait training;neuromuscular re-education;ROM;strengthening;stretching;transfer training   Clinical Impression   Criteria for Skilled Therapeutic Interventions Met yes, treatment indicated   PT Diagnosis Difficulty walking   Influenced by the following impairments functional weakness, limited ROM, instability, mild numbness   Functional limitations due to impairments increased fall risk, more difficulty with community and household mobility/ambulation   Clinical Presentation Evolving/Changing   Clinical Presentation Rationale fall history, fatigue, fluctuating gait pattern   Clinical Decision Making (Complexity) Moderate complexity   Therapy Frequency 2 times/Week  (decreasing to 1x/week)   Predicted Duration of Therapy Intervention (days/wks) 8 weeks  (90 day cert due to scheduling conflicts)   Risk & Benefits of therapy have been explained Yes   Patient, Family & other staff in agreement with plan of care Yes   Clinical Impression Comments Héctor would benefit from skilled PT services to address impaired balance and mobility secondary to Parkinson's disease.  He is at a higher risk for falling. As the client is already familiar with the BIG program will complete a modified approach.   Goal 1   Goal Identifier 6MWT   Goal Description The client will ambulate>1450' in 6 minutes due to improvements in balance and overall gait mechanics, thereby improving ability to ambulate community distances safely   Target Date 09/20/21   Goal 2   Goal Identifier FGA   Goal Description Client will improve balance on FGA to at least a 23/30 to help decrease risk for falls    Target Date 09/20/21   Goal 3   Goal Identifier 30 sec STS   Goal Description Client will be able to do at least 9 reps on the 30 sec STS test demontrating, improving ability to safely transfer from a variety of surfaces   Target Date 09/20/21   Goal 4   Goal Identifier BIG program   Goal Description The client will demonstrate independence with BIG exercises and complete daily in order to maintain gains and prevent falls/decline in mobilty at the conclusion of physical therapy   Target Date 09/20/21   Total Evaluation Time   PT [place], Moderate Complexity Minutes (88349) 40   Therapy Certification   Certification date from 06/23/21   Certification date to 09/20/21   Medical Diagnosis [facility] s dementia

## 2021-07-19 ENCOUNTER — HOSPITAL ENCOUNTER (OUTPATIENT)
Dept: SPEECH THERAPY | Facility: CLINIC | Age: 86
Setting detail: THERAPIES SERIES
End: 2021-07-19
Attending: INTERNAL MEDICINE
Payer: MEDICARE

## 2021-07-19 PROCEDURE — 92524 BEHAVRAL QUALIT ANALYS VOICE: CPT | Mod: GN,KX | Performed by: STUDENT IN AN ORGANIZED HEALTH CARE EDUCATION/TRAINING PROGRAM

## 2021-07-22 NOTE — PROGRESS NOTES
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  The last visit was on 12/10/2020.  At that time no medication changes were made, but we planned to do a cognitive assessment at the next visit.  Please refer to my initial/other prior notes for further information.  The patient is accompanied by his wife, who participates in the interview today.    Since the last visit, the patient reports no significant changes in his symptoms, though admits that he has trouble getting out of the car, which takes him a long time, also does not have endurance to go for longer walks (needs to stop and rest).  Wondering if I could sign handicap parking paperwork.  His voice is quite weak.  Trial of Sinemet resulted in intolerable nausea.  Currently he is enrolled in [employer] Program, which is helpful. Denies interval development of new focal neurological symptoms.    According to patient's wife, she did not notice any significant cognitive changes or worsening of his symptoms.  She has no additional concerns.    On review of systems, patient endorses no other active complaints. Medications, allergies, family and social history were also reviewed. There are no changes reported by patient.  Planning to leave to [place] to spend winter in January 2022.  REVIEW OF SYSTEMS:                                                    10-system review was completed. Pertinent positives are included in HPI. The remainder of ROS is negative.  EXAM:                                                     Physical Exam:   Vitals: /65   Pulse 66   Bruce Cognitive Assessment:    [place] Cognitive Assessment (MOCA)  Visuospatial/Executive : 4  Naming: 3  Attention - Digits: 2  Attention - Letters: 1  Attention - Subtraction: 3  Language - Repeat: 2  Language - Fluency : 0  Abstraction: 2  Delayed Recall: 2  Orientation: 5  Education: 0  MOCA Score: 24  Administered by: : Annabelle BERNAL     Bruce Cognitive Assessment Score:  MOCA Score: 24/30.     General: pt is in NAD, cooperative.  Skin: normal turgor, moist mucous membranes, no lesions/rashes noticed.  HEENT: ATNC, white sclera, normal conjunctiva.  Respiratory: Symmetric lung excursion, no accessory respiratory muscle use.  Abdomen: Non distended.  Neurological: awake, cooperative, follows commands, MoCA as per above, no significant exam changes compared to the last visit.  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  Sinemet caused intolerable nausea.  In the past, we reviewed additional treatment options (retrial of Sinemet, amantadine, dopamine agonists), but the patient was not interested.  He is currently enrolled in [employer] Program, which is beneficial.    His MoCA today is 24/30, consistent with mild cognitive impairment.  Previously we discussed that we might consider doing additional lab work-up, CPT, and neuropsychology evaluation if it is in abnormal range.  Today we discussed this work-up, and decided to proceed with labs and neuropsychology evaluation.  I do not think that CPT at this point is warranted.    We also discussed the lumbar and cervical spine MRI's given his reported difficulty walking and getting out of the car to evaluate for additional causes, including cervical or  lumbar spinal stenosis.  We decided to hold off on additional testing at this point, but would consider them in the future if symptoms worsen.    Regarding patient's hypophonia, currently he is working with speech therapist, but if symptoms persist, a referral to [facility] Voice clinic should be considered.    Diagnoses:    ICD-10-CM    1. 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 Cognitive complaints  R41.9 Vitamin B12     Methylmalonic Acid     Vitamin B1 whole blood     TSH with free T4 reflex     NEUROPSYCHOLOGY REFERRAL     Plan: At today's visit we thoroughly discussed current symptoms, cognitive test results (mildly abnormal), additionally needed testing, available treatment options, and the plan.    We decided to proceed with additional testing: screening labs (the patient will call his local [place] clinic to schedule) and neuropsychologic testing (ordered).    I advised him to continue [employer] and [employer] program.    I completed the requested paperwork for handicap parking.    No medication changes.    Next follow-up appointment is in the next 3 months or earlier if needed.    Total Time: 45 minutes spent on the date of the encounter doing chart review, history and exam, documentation and further activities per the note.    Vasu Georges MD  [facility] Neurology  (Chart documentation was completed in part with Dragon voice-recognition software. Even though reviewed, some grammatical, spelling, and word errors may remain.)

## 2021-07-22 NOTE — PATIENT INSTRUCTIONS
AFTER VISIT SUMMARY (AVS):    At today's visit we thoroughly discussed current symptoms, cognitive test results (mildly abnormal), additionally needed testing, available treatment options, and the plan.    We decided to proceed with additional testing: screening labs (please call your local [place] clinic to schedule) and neuropsychologic testing (ordered, the clinic will call you).    Please continue [employer] and Loud program.    No medication changes.    Next follow-up appointment is in the next 3 months or earlier if needed.    Please do not hesitate to call me with any questions or concerns.    Thanks.

## 2021-07-23 ENCOUNTER — OFFICE VISIT (OUTPATIENT)
Dept: NEUROLOGY | Facility: CLINIC | Age: 86
End: 2021-07-23
Attending: PSYCHIATRY & NEUROLOGY
Payer: MEDICARE

## 2021-07-23 VITALS — DIASTOLIC BLOOD PRESSURE: 65 MMHG | HEART RATE: 66 BPM | SYSTOLIC BLOOD PRESSURE: 106 MMHG

## 2021-07-23 DIAGNOSIS — G21.4 VASCULAR PARKINSONISM (H): Primary | ICD-10-CM

## 2021-07-23 DIAGNOSIS — R41.9 COGNITIVE COMPLAINTS: ICD-10-CM

## 2021-07-23 PROCEDURE — G0463 HOSPITAL OUTPT CLINIC VISIT: HCPCS

## 2021-07-23 PROCEDURE — 99215 OFFICE O/P EST HI 40 MIN: CPT | Performed by: PSYCHIATRY & NEUROLOGY

## 2021-07-23 ASSESSMENT — MONTREAL COGNITIVE ASSESSMENT (MOCA)
VISUOSPATIAL/EXECUTIVE SUBSCORE: 4
11. FOR EACH PAIR OF WORDS, WHAT CATEGORY DO THEY BELONG TO (OUT OF 2): 2
9. REPEAT EACH SENTENCE: 2
WHAT LEVEL OF EDUCATION WAS ATTAINED: 0
12. MEMORY INDEX SCORE: 2
WHAT IS THE TOTAL SCORE (OUT OF 30): 24
8. SERIAL SUBTRACTION OF 7S: 3
13. ORIENTATION SUBSCORE: 5
4. NAME EACH OF THE THREE ANIMALS SHOWN: 3
6. READ LIST OF DIGITS [FORWARD/BACKWARD]: 2
10. [FLUENCY] NAME WORDS STARTING WITH DESIGNATED LETTER: 0
7. [VIGILENCE] TAP WHEN HEARING DESIGNATED LETTER: 1

## 2021-07-23 ASSESSMENT — PAIN SCALES - GENERAL: PAINLEVEL: NO PAIN (0)

## 2021-07-23 NOTE — LETTER
7/23/2021         RE: Leonard Garza  [address] [facility] [address]        Dear Colleague,    Thank you for referring your patient, Leonard Garza, to the [facility] NEUROLOGY CLIN[facility] [place]. Please see a copy of my visit note below.    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  The last visit was on 12/10/2020.  At that time no medication changes were made, but we planned to do a cognitive assessment at the next visit.  Please refer to my initial/other prior notes for further information.  The patient is accompanied by his wife, who participates in the interview today.    Since the last visit, the patient reports no significant changes in his symptoms, though admits that he has trouble getting out of the car, which takes him a long time, also does not have endurance to go for longer walks (needs to stop and rest).  Wondering if I could sign handicap parking paperwork.  His voice is quite weak.  Trial of Sinemet resulted in intolerable nausea.  Currently he is enrolled in [employer] [facility], which is helpful. Denies interval development of new focal neurological symptoms.    According to patient's wife, she did not notice any significant cognitive changes or worsening of his symptoms.  She has no additional concerns.    On review of systems, patient endorses no other active complaints. Medications, allergies, family and social history were also reviewed. There are no changes reported by patient.  Planning to leave to [place] to  spend winter in January 2022.  REVIEW OF SYSTEMS:                                                    10-system review was completed. Pertinent positives are included in HPI. The remainder of ROS is negative.  EXAM:                                                    Physical Exam:   Vitals: /65   Pulse 66   Bruce Cognitive Assessment:    Bruce Cognitive Assessment (MOCA)  Visuospatial/Executive : 4  Naming: 3  Attention - Digits: 2  Attention - Letters: 1  Attention - Subtraction: 3  Language - Repeat: 2  Language - Fluency : 0  Abstraction: 2  Delayed Recall: 2  Orientation: 5  Education: 0  MOCA Score: 24  Administered by: : Annabelle BERNAL     Bruce Cognitive Assessment Score:   /30.     General: pt is in NAD, cooperative.  Skin: normal turgor, moist mucous membranes, no lesions/rashes noticed.  HEENT: ATNC, white sclera, normal conjunctiva.  Respiratory: Symmetric lung excursion, no accessory respiratory muscle use.  Abdomen: Non distended.  Neurological: awake, cooperative, follows commands, MoCA as per above, no significant exam changes compared to the last visit.  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  Sinemet caused intolerable nausea.  In the past, we reviewed additional treatment options (retrial of Sinemet, amantadine, dopamine agonists), but the patient was not interested.  He is currently enrolled in [employer] Program, which is beneficial.    His MoCA today is 24/30, consistent with mild cognitive impairment.  Previously we discussed that we might consider doing additional lab work-up, CPT, and neuropsychology evaluation if it is in abnormal range.  Today we discussed this work-up, and decided to proceed with labs and  neuropsychology evaluation.  I do not think that CPT at this point is warranted.    We also discussed the lumbar and cervical spine MRI's given his reported difficulty walking and getting out of the car to evaluate for additional causes, including cervical or lumbar spinal stenosis.  We decided to hold off on additional testing at this point, but would consider them in the future if symptoms worsen.    Regarding patient's hypophonia, currently he is working with speech therapist, but if symptoms persist, a referral to [facility] Voice clinic should be considered.    Diagnoses:    ICD-10-CM    1. 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 Cognitive complaints  R41.9 Vitamin B12     Methylmalonic Acid     Vitamin B1 whole blood     TSH with free T4 reflex     NEUROPSYCHOLOGY REFERRAL     Plan: At today's visit we thoroughly discussed current symptoms, cognitive test results (mildly abnormal), additionally needed testing, available treatment options, and the plan.    We decided to proceed with additional testing: screening labs (the patient will call he is local [facility] clinic to schedule) and neuropsychologic testing (ordered).    I advised him to continue [employer] program.    I completed the requested paperwork for handicap parking.    No medication changes.    Next follow-up appointment is in the next 3 months or earlier if needed.    Total Time: 45 minutes spent on the date of the encounter doing chart review, history and exam, documentation and further activities per the note.    Vasu Georges MD  [facility] Neurology  (Chart documentation was completed in part with Dragon voice-recognition software. Even though reviewed, some grammatical, spelling, and word errors may remain.)      Again, thank you for allowing me to participate in the care of your patient.        Sincerely,        Vasu Georges MD

## 2021-07-28 ENCOUNTER — HOSPITAL ENCOUNTER (OUTPATIENT)
Dept: PHYSICAL THERAPY | Facility: CLINIC | Age: 86
Setting detail: THERAPIES SERIES
End: 2021-07-28
Attending: INTERNAL MEDICINE
Payer: MEDICARE

## 2021-07-28 PROCEDURE — 97110 THERAPEUTIC EXERCISES: CPT | Mod: GP | Performed by: PHYSICAL THERAPIST

## 2021-07-28 PROCEDURE — 97530 THERAPEUTIC ACTIVITIES: CPT | Mod: GP,KX | Performed by: PHYSICAL THERAPIST

## 2021-07-28 PROCEDURE — 97112 NEUROMUSCULAR REEDUCATION: CPT | Mod: GP,KX | Performed by: PHYSICAL THERAPIST

## 2021-07-29 ENCOUNTER — HOSPITAL ENCOUNTER (OUTPATIENT)
Dept: PHYSICAL THERAPY | Facility: CLINIC | Age: 86
Setting detail: THERAPIES SERIES
End: 2021-07-29
Attending: INTERNAL MEDICINE
Payer: MEDICARE

## 2021-07-29 PROCEDURE — 97530 THERAPEUTIC ACTIVITIES: CPT | Mod: GP,KX | Performed by: PHYSICAL THERAPIST

## 2021-07-29 PROCEDURE — 97110 THERAPEUTIC EXERCISES: CPT | Mod: GP | Performed by: PHYSICAL THERAPIST

## 2021-07-29 PROCEDURE — 97112 NEUROMUSCULAR REEDUCATION: CPT | Mod: GP | Performed by: PHYSICAL THERAPIST

## 2021-08-05 ENCOUNTER — HOSPITAL ENCOUNTER (OUTPATIENT)
Dept: PHYSICAL THERAPY | Facility: CLINIC | Age: 86
Setting detail: THERAPIES SERIES
End: 2021-08-05
Attending: INTERNAL MEDICINE
Payer: MEDICARE

## 2021-08-05 PROCEDURE — 97112 NEUROMUSCULAR REEDUCATION: CPT | Mod: GP,KX | Performed by: PHYSICAL THERAPIST

## 2021-08-05 PROCEDURE — 97110 THERAPEUTIC EXERCISES: CPT | Mod: GP,KX | Performed by: PHYSICAL THERAPIST

## 2021-08-05 PROCEDURE — 97530 THERAPEUTIC ACTIVITIES: CPT | Mod: GP | Performed by: PHYSICAL THERAPIST

## 2021-08-09 ENCOUNTER — HOSPITAL ENCOUNTER (OUTPATIENT)
Dept: PHYSICAL THERAPY | Facility: CLINIC | Age: 86
Setting detail: THERAPIES SERIES
End: 2021-08-09
Attending: INTERNAL MEDICINE
Payer: MEDICARE

## 2021-08-09 PROCEDURE — 97110 THERAPEUTIC EXERCISES: CPT | Mod: GP,KX | Performed by: PHYSICAL THERAPIST

## 2021-08-09 PROCEDURE — 97112 NEUROMUSCULAR REEDUCATION: CPT | Mod: GP,KX | Performed by: PHYSICAL THERAPIST

## 2021-08-09 PROCEDURE — 97530 THERAPEUTIC ACTIVITIES: CPT | Mod: GP,KX | Performed by: PHYSICAL THERAPIST

## 2021-08-11 ENCOUNTER — HOSPITAL ENCOUNTER (OUTPATIENT)
Dept: PHYSICAL THERAPY | Facility: CLINIC | Age: 86
Setting detail: THERAPIES SERIES
End: 2021-08-11
Attending: INTERNAL MEDICINE
Payer: MEDICARE

## 2021-08-11 PROCEDURE — 97530 THERAPEUTIC ACTIVITIES: CPT | Mod: GP,KX | Performed by: PHYSICAL THERAPIST

## 2021-08-11 PROCEDURE — 97110 THERAPEUTIC EXERCISES: CPT | Mod: GP,KX | Performed by: PHYSICAL THERAPIST

## 2021-08-11 PROCEDURE — 97112 NEUROMUSCULAR REEDUCATION: CPT | Mod: GP | Performed by: PHYSICAL THERAPIST

## 2021-08-25 ENCOUNTER — HOSPITAL ENCOUNTER (OUTPATIENT)
Dept: PHYSICAL THERAPY | Facility: CLINIC | Age: 86
Setting detail: THERAPIES SERIES
End: 2021-08-25
Attending: INTERNAL MEDICINE
Payer: MEDICARE

## 2021-08-25 PROCEDURE — 97112 NEUROMUSCULAR REEDUCATION: CPT | Mod: GP,KX | Performed by: PHYSICAL THERAPIST

## 2021-08-25 PROCEDURE — 97110 THERAPEUTIC EXERCISES: CPT | Mod: GP,KX | Performed by: PHYSICAL THERAPIST

## 2021-08-25 PROCEDURE — 97530 THERAPEUTIC ACTIVITIES: CPT | Mod: GP,KX | Performed by: PHYSICAL THERAPIST

## 2021-08-25 NOTE — PROGRESS NOTES
[facility]        OUTPATIENT PHYSICAL THERAPY FUNCTIONAL EVALUATION  PLAN OF TREATMENT FOR OUTPATIENT REHABILITATION  (COMPLETE FOR INITIAL CLAIMS ONLY)  Patient's Last Name, First Name, M.I.  YOB: 1935  Leonard Garza     Provider's Name   [facility]   Medical Record No.  [id number]     Start of Care Date:  06/23/21   Onset Date:  05/20/21 (order date)   Type:     _X__PT   ____OT  ____SLP Medical Diagnosis:  [place] s dementia     PT Diagnosis:  Difficulty walking Visits from SOC:  1                              __________________________________________________________________________________  Plan of Treatment/Functional Goals:  balance training, gait training, neuromuscular re-education, ROM, strengthening, stretching, transfer training           GOALS  6MWT  The client will ambulate>1450' in 6 minutes due to improvements in balance and overall gait mechanics, thereby improving ability to ambulate community distances safely  09/20/21    FGA  Client will improve balance on FGA to at least a 23/30 to help decrease risk for falls   09/20/21    30 sec STS  Client will be able to do at least 9 reps on the 30 sec STS test demontrating, improving ability to safely transfer from a variety of surfaces  09/20/21    BIG program  The client will demonstrate independence with BIG exercises and complete daily in order to maintain gains and prevent falls/decline in mobilty at the conclusion of physical therapy  09/20/21                  Therapy Frequency:  2 times/Week (decreasing to 1x/week)   Predicted Duration of Therapy Intervention:  8 weeks (90 day cert due to scheduling conflicts)    Shabnam Norton, PT                                    I CERTIFY THE NEED FOR THESE SERVICES FURNISHED UNDER        THIS PLAN OF TREATMENT AND WHILE UNDER MY CARE .              Physician Signature               Date    X_____________________________________________________                      Certification Date From:  06/23/21   Certification Date To:  09/20/21    Referring Provider:  Holden Blanco    Initial Assessment  See Epic Evaluation- Start of Care Date: 06/23/21

## 2021-09-01 ENCOUNTER — HOSPITAL ENCOUNTER (OUTPATIENT)
Dept: PHYSICAL THERAPY | Facility: CLINIC | Age: 86
Setting detail: THERAPIES SERIES
End: 2021-09-01
Attending: INTERNAL MEDICINE
Payer: MEDICARE

## 2021-09-01 PROCEDURE — 97110 THERAPEUTIC EXERCISES: CPT | Mod: GP,KX | Performed by: PHYSICAL THERAPIST

## 2021-09-01 PROCEDURE — 97112 NEUROMUSCULAR REEDUCATION: CPT | Mod: GP,KX | Performed by: PHYSICAL THERAPIST

## 2021-09-01 PROCEDURE — 97530 THERAPEUTIC ACTIVITIES: CPT | Mod: GP,KX | Performed by: PHYSICAL THERAPIST

## 2021-09-02 NOTE — PROGRESS NOTES
[facility]          OUTPATIENT SPEECH LANGUAGE PATHOLOGY VOICE EVALUATION  PLAN OF TREATMENT FOR OUTPATIENT REHABILITATION  (COMPLETE FOR INITIAL CLAIMS ONLY)    Patient's Last Name, First Name, M.I.  YOB: 1935  Leonard Garza                        Provider s Name: [facility] Medical Record No.  [id number]     Onset Date:  6/9/2021 (order date)    Start of Care Date:  7/19/2021   Type:     ___PT  __OT   _X_SLP    Medical Diagnosis: Vascular Parkinsonism   Speech Language Pathology Diagnosis:  Mild-moderate hypokinetic dysarthria and dysphonia    Visits from SOC: 1      _________________________________________________________________________________  Plan of Treatment/Functional Goals:  Voice         Goals     1. Goal Identifier: Breath       Goal Description: Patient will increase vocal loudness to an average SPL of >75 dB at 30 cm during sustained /a/ phonation of at least 20 seconds duration given min cues in order to increase vocal respiratory support required for functional communication.       Target Date: 11/19/21   2. Goal Identifier: Volume       Goal Description: Patient will increase vocal loudness during narrative reading and semi-spontaneous conversation tasks to a minimum range of 70-75 dB at 30 cm given min cues in order to improve vocal loudness for daily communication tasks.       Target Date: 11/19/21   3. Goal Identifier: Voice quality       Goal Description: In a 20-minute speech task, patient will demonstrate roughness, breathiness, and hypernasality that do not exceed a level of 2 out of 10, 90% of the time by SLP judgment, so that patient is able to meet his voice quality demands.       Target Date: 11/19/21                    Frequency and Duration: 1x/week for 8 weeks, with 1-2 monthly follow-ups  Rosi Martinez, SLP       I CERTIFY THE NEED FOR THESE  SERVICES FURNISHED UNDER        THIS PLAN OF TREATMENT AND WHILE UNDER MY CARE     (Physician co-signature of this document indicates review and certification of the therapy plan).                Certification Date From:  08/19/21 (delayed start d/t scheduling difficulty)  Certification Date To:  11/19/21  Referring Provider: Gian Fuller MD                 Initial Assessment        See Epic Evaluation Start of Care

## 2021-09-02 NOTE — PROGRESS NOTES
Speech-Language Pathology Department   EVALUATION  [facility]    07/19/21 0915   General Information   Type Of Visit Initial   Start Of Care Date 07/19/21   Referring Physician Gian Fuller MD  (PCP)   Orders Evaluate And Treat   Orders Comment Big and loud program   Medical Diagnosis Vascular Parkinsonism   Onset Of Illness/injury Or Date Of Surgery 06/23/21  (order date)   Precautions/Limitations  fall precautions  (also seeing PT)   Hearing Hearing loss--wears bilateral hearing aids   Surgical/Medical history reviewed Yes   Pertinent History Of Current Problem 85yo male initially diagnosed with Parkinson's disease in 2018 and recently diagnosed with vascular parkinsonism presenting with voice and speech symptoms.  Pt has been seen at this clinic for LSVT LOUD in the past, most recently in 2020.  Pt reports that his voice is quiet and his wife will frequently ask him to repeat himself.  His voice will also sound a little hoarse.  He denies significant difficulty with articulation, and does not have any difficulties with swallowing or breathing.  His voice and speech symptoms come and go.  He completed a course of speech therapy in [place] this past March and notes that he did have improvement in his symptoms, but his symptoms have started to worsen again.  Please see chart and previous speech therapy evaluation reports for additional PMH.   Prior Level of Functioning Previous therapy for this problem.   Prior Level Of Function Comment Pt is familiar to this clinician from previous courses of skilled speech therapy following the LSVT LOUD protocol.  Pt was last seen at this clinic in October 2020.  He also received speech therapy in [place] in March 2021.   Patient Role/employment History Retired   Living environment [facility]   General Observations Pt reports that today is a typical voice day.   Patient/family Goals To improve his vocal loudness   General  "Information Comments KX Modifier added--pt has already met his therapy cap prior to completion of this evaluation.  Skilled SLP services considered medically necessary to fulfill MD order for an evaluation.   Fall Risk Screen   Fall screen completed by PT   Voice Profile during conversation, 1 min monologue and paragraph reading   Voice Quality Breathy;Raspy   Voice quality comments SPEECH: Consistent mild-moderate breathiness with intermittent mild roughness.  SINGING: improved volume and quality compared to speech in low-mid range, with increased roughness and breathiness at higher pitches.  THERAPY PROBES: good improvement in voice quality and volume with \"think loud\", flow mode, forward resonance, and semi-occluded vocal tract probes.   Voice quality severity rating continuum (1=Severe, 7=WNL) 5  (CAPE-V Overall Severity: 32/100)   Breath control Weak   Breath Control comments Inspirations for speech are shallow with poor respiratory/phonatory coordination.   Breath control severity rating continuum (1=Severe, 7=WNL) 5   Voice Use / Effort WNL   Voice Use / Effort comments Pt rates his phonatory effort for speech as 4/10 (10 is maximum effort) when not thinking about using voice techniques, and 7/10 when thinking about using voice techniques.   Voice use / Effort severity rating continuum (1=Severe, 7=WNL) 6   Fundamental frequency (Hz)   (Centered around Bb2)   Pitch /Frequency Description Monotone  (Mild)   Pitch / Frequency severity rating continuum (1=Severe, 7=WNL) 5   Average dB 67 dB   Volume Too quiet   Volume comments Volume for conversational speech is mildly reduced during the evaluation; however, pt was using a louder voice during the evaluation than during the history, likely as a training effect from previous therapy.  A whisper is normal.  Loud phonation is clear in quality with good volume increase.   Volume severity rating continuum (1=Severe, 7=WNL) 5   Neuromuscular Control Hypokinetic "   Neuromuscular Control severity rating continuum (1=Severe, 7=WNL) 5   Neuromuscular comments Mildly imprecise articulation noted throughout all speech tasks.  Labial and posterior lingual AMRs are WNL.  Anterior lingual AMRs are intermittently run together.  Labial/lingual SMRs are slow but regular in rhythm.   Resonance Hypernasal  (Mild intermittent)   Resonance severity rating continuum (1=Severe, 7=WNL) 6   Comments Mild-moderate dysphonia and hypokinetic dysarthria characterized by reduced volume, breathiness, roughness, imprecise articulation, monotone intonation, poor respiratory/phonatory coordination, and increased phonatory effort for speech.   Adduction /Abduction Function   Coupe de glottes per sec Unable to determine d/t run together quality   Laryngeal diadokinetic strength Weak   Laryngeal diadokinetic consistency Run together   Adduction / Abduction function comments Hypokinetic   Adduction / Abduction function scale Age 66+, norm per sec:  4   Vibratory Function of Vocal Folds   Prolonged 'ah' at mid pitch (sec) 19.7 seconds at C3   Prolonged 'ah' at high pitch (sec) 27.7 seconds at E3   Prolonged 'ah' at low pitch (sec) 16.5 seconds at Ab2   Vibratory Function of Vocal Folds Scale Males 20 - 80 yrs: 15 - 25 secs   Vibratory Function of Vocal Folds Comments WNL   Compression Strength of Vocal Folds / Larynx Muscles   Vowels average volume dB 75 dB at C3; 76 dB at E3; 71 dB at Ab2   Reading aloud average volume dB 68 dB   Conversation average volume dB 67 dB   Dynamic volume range minimum dB 56 dB   Dynamic volume range maximum dB 84 dB   Efficiency of increased volume Relaxed throat   Ease / Effort of loud/quiet volumes Quiet volume easier   Function of Lengtheners / Shorteners (CT and TA Muscles)   Pitch glides Upper register present;Upper pitches more dysphonic  (Lowest: F2; Highest: F#4)   General Therapy Interventions   Planned Therapy Interventions Voice   Voice LSVT generalized to community  setting;Larynx strengthening;Larynx and TVF flexibility;Voice quality/pitch or volume tasks   Impressions and Recommendations   Communication Diagnosis Mild-moderate hypokinetic dysarthria and dysphonia   Summary 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  Voice and speech symptoms are characterized by reduced volume, breathiness, roughness, imprecise articulation, monotone intonation, poor respiratory/phonatory coordination, and increased phonatory effort for speech.  People frequently have difficulty understanding him because of his voice and speech symptoms.   Recommendations A refresher course of LSVT LOUD based therapy is recommended in order to improve vocal volume, voice quality, and laryngeal strength for daily conversational speech demands.   Frequency and Duration 1x/week for 8 weeks, with 1-2 monthly follow-ups   Prognosis  Good with intervention   Risks and Benefits of Treatment have been explained. Yes   Patient & /or Caregiver  in agreement with plan of care Yes   Patient Education SLP provided education regarding evaluation findings and proposed POC.  Pt verbalized understanding.   Educational Assessment   Barriers to Learning No barriers   Preferred Learning Style Listening;Reading;Demonstration;Pictures / Video   Voice Goals   Voice Goals 1;2;3   Voice Goal 1   Goal Identifier Breath   Goal Description Patient will increase vocal loudness to an average SPL of >75 dB at 30 cm during sustained /a/ phonation of at least 20 seconds duration given min cues in order to increase vocal respiratory support required for functional communication.   Target Date 11/19/21   Voice Goal 2   Goal Identifier Volume   Goal Description Patient will increase vocal loudness during narrative reading and semi-spontaneous conversation tasks to a minimum range of 70-75 dB at 30 cm given min cues in order to improve vocal loudness for daily  communication tasks.   Target Date 11/19/21   Voice Goal 3   Goal Identifier Voice quality   Goal Description In a 20-minute speech task, patient will demonstrate roughness, breathiness, and hypernasality that do not exceed a level of 2 out of 10, 90% of the time by SLP judgment, so that patient is able to meet his voice quality demands.   Target Date 11/19/21   Total Session Time   Voice Minutes (16525) 35   Total Evaluation Time 35   Therapy Certification   Certification date from 08/19/21  (delayed start d/t scheduling difficulty)   Certification date to 11/19/21   Medical Diagnosis Vascular Parkinsonism     Thank you for the referral of this patient.    Allison Alpers Ackmann, B.A. (music) MYANDEL, CCC-SLP  Speech-Language Pathologist  Certificate of Vocology  [facility]  [phone]

## 2021-09-08 ENCOUNTER — HOSPITAL ENCOUNTER (OUTPATIENT)
Dept: PHYSICAL THERAPY | Facility: CLINIC | Age: 86
Setting detail: THERAPIES SERIES
End: 2021-09-08
Attending: INTERNAL MEDICINE
Payer: MEDICARE

## 2021-09-08 PROCEDURE — 97110 THERAPEUTIC EXERCISES: CPT | Mod: GP,KX | Performed by: PHYSICAL THERAPIST

## 2021-09-08 PROCEDURE — 97112 NEUROMUSCULAR REEDUCATION: CPT | Mod: GP | Performed by: PHYSICAL THERAPIST

## 2021-09-15 ENCOUNTER — HOSPITAL ENCOUNTER (OUTPATIENT)
Dept: SPEECH THERAPY | Facility: CLINIC | Age: 86
Setting detail: THERAPIES SERIES
End: 2021-09-15
Attending: INTERNAL MEDICINE
Payer: MEDICARE

## 2021-09-15 DIAGNOSIS — R60.9 EDEMA, UNSPECIFIED TYPE: ICD-10-CM

## 2021-09-15 DIAGNOSIS — I10 ESSENTIAL HYPERTENSION, BENIGN: ICD-10-CM

## 2021-09-15 PROCEDURE — 92507 TX SP LANG VOICE COMM INDIV: CPT | Mod: GN | Performed by: SPEECH-LANGUAGE PATHOLOGIST

## 2021-09-15 NOTE — LETTER
[facility]  [address] [place] [address]  [phone]      9/20/2021       Leonard Garza  [address] [place] [id number]        Dear Leonard,  You are due for a follow up appointment with Dr. Fuller regarding your prescription for irbesartan-hydrochlorothiazide (AVALIDE) 150-12.5 MG tablet   Please call to schedule, [phone].   Your prescriptions has been refilled for 90 days, There will be no new refills until after your follow up with Dr. Fuller.       Sincerely,      Gian Fuller MD/Fior Peguero, [facility]  Internal Medicine

## 2021-09-16 RX ORDER — IRBESARTAN AND HYDROCHLOROTHIAZIDE 150; 12.5 MG/1; MG/1
TABLET, FILM COATED ORAL
Qty: 90 TABLET | Refills: 0 | Status: SHIPPED | OUTPATIENT
Start: 2021-09-16 | End: 2021-11-11

## 2021-09-16 NOTE — TELEPHONE ENCOUNTER
I have filled prescription but patient was to follow-up for recheck of blood pressure and renal function test

## 2021-09-16 NOTE — TELEPHONE ENCOUNTER
Routing refill request to provider for review/approval because:  Labs not current:    Creatinine   Date Value Ref Range Status   08/11/2020 1.30 (H) 0.66 - 1.25 mg/dL Final     Patient needs to be seen because it has been more than 1 year since last office visit.  Marisa Cruz RN

## 2021-09-19 DIAGNOSIS — Z13.6 CARDIOVASCULAR SCREENING; LDL GOAL LESS THAN 100: ICD-10-CM

## 2021-09-20 ENCOUNTER — HOSPITAL ENCOUNTER (OUTPATIENT)
Dept: SPEECH THERAPY | Facility: CLINIC | Age: 86
Setting detail: THERAPIES SERIES
End: 2021-09-20
Attending: INTERNAL MEDICINE
Payer: MEDICARE

## 2021-09-20 PROCEDURE — 92507 TX SP LANG VOICE COMM INDIV: CPT | Mod: GN,KX | Performed by: STUDENT IN AN ORGANIZED HEALTH CARE EDUCATION/TRAINING PROGRAM

## 2021-09-20 RX ORDER — ATORVASTATIN CALCIUM 20 MG/1
TABLET, FILM COATED ORAL
Qty: 90 TABLET | Refills: 0 | Status: SHIPPED | OUTPATIENT
Start: 2021-09-20 | End: 2021-11-11

## 2021-09-20 NOTE — TELEPHONE ENCOUNTER
Routing refill request to provider for review/approval because:  Labs not current:    LDL Cholesterol Calculated   Date Value Ref Range Status   08/11/2020 64 <100 mg/dL Final     Comment:     Desirable:       <100 mg/dl   Patient needs to be seen because it has been more than 1 year since last office visit.  Marisa Cruz RN

## 2021-09-27 ENCOUNTER — HOSPITAL ENCOUNTER (OUTPATIENT)
Dept: SPEECH THERAPY | Facility: CLINIC | Age: 86
Setting detail: THERAPIES SERIES
End: 2021-09-27
Attending: INTERNAL MEDICINE
Payer: MEDICARE

## 2021-09-27 PROCEDURE — 92507 TX SP LANG VOICE COMM INDIV: CPT | Mod: GN,KX | Performed by: STUDENT IN AN ORGANIZED HEALTH CARE EDUCATION/TRAINING PROGRAM

## 2021-10-04 ENCOUNTER — HOSPITAL ENCOUNTER (OUTPATIENT)
Dept: SPEECH THERAPY | Facility: CLINIC | Age: 86
Setting detail: THERAPIES SERIES
End: 2021-10-04
Attending: INTERNAL MEDICINE
Payer: MEDICARE

## 2021-10-04 PROCEDURE — 92507 TX SP LANG VOICE COMM INDIV: CPT | Mod: GN,KX | Performed by: STUDENT IN AN ORGANIZED HEALTH CARE EDUCATION/TRAINING PROGRAM

## 2021-10-11 ENCOUNTER — HOSPITAL ENCOUNTER (OUTPATIENT)
Dept: SPEECH THERAPY | Facility: CLINIC | Age: 86
Setting detail: THERAPIES SERIES
End: 2021-10-11
Attending: INTERNAL MEDICINE
Payer: MEDICARE

## 2021-10-11 PROCEDURE — 92507 TX SP LANG VOICE COMM INDIV: CPT | Mod: GN,KX | Performed by: STUDENT IN AN ORGANIZED HEALTH CARE EDUCATION/TRAINING PROGRAM

## 2021-10-18 ENCOUNTER — HOSPITAL ENCOUNTER (OUTPATIENT)
Dept: SPEECH THERAPY | Facility: CLINIC | Age: 86
Setting detail: THERAPIES SERIES
End: 2021-10-18
Attending: INTERNAL MEDICINE
Payer: MEDICARE

## 2021-10-18 PROCEDURE — 92507 TX SP LANG VOICE COMM INDIV: CPT | Mod: GN,KX | Performed by: STUDENT IN AN ORGANIZED HEALTH CARE EDUCATION/TRAINING PROGRAM

## 2021-10-18 NOTE — PROGRESS NOTES
"Outpatient Speech Language Pathology Discharge Note     Patient: Leonard Garza  : 1935    Beginning/End Dates of Reporting Period:  2021 to 10/18/2021    Referring Provider: Dr. Fuller    Therapy Diagnosis: Mild-moderate hypokinetic dysarthria and dysphonia    Client Self Report: Pt arrived on time, reporting that he has noticed increased frequency of choking on his saliva.  Overall, he thinks he is doing well with his therapy exercises.  He plans on returning for follow-up after he returns to [place] from [place] in the spring.     Objective Measurements:      Breath: Patient produced sustained phonation \"ah\" for an average 79 dB for an average 22.3 sec given no-min cues in 15 trials. Pt produced pitch glides low to high and high to low in a range of Ab2-A3 in 15 trials while maintaining loudness >70 dB when given min cues. Volume: Pt produced 10 functional phrases for 5 repetitions with an average 74 dB given no-min cues. Given min cues to maintain target loudness, especially toward the end of the session, pt participated in semi-spontaneous conversation with SLP=73 dB and read aloud from a magazine=71 dB.  Pt demonstrating increased vocal breathiness toward the end of the session, with minimal roughness and hypernasality.            Goals:  Goal Identifier Breath   Goal Description Patient will increase vocal loudness to an average SPL of >75 dB at 30 cm during sustained /a/ phonation of at least 20 seconds duration given min cues in order to increase vocal respiratory support required for functional communication.   Target Date 21   Date Met  10/18/21   Progress (detail required for progress note):  Good, goal met.  Patient demonstrates an average 79 dB and 22.3 second duration during sustained vowel phonation across 15 repetitions given no-min cues from SLP.     Goal Identifier Volume   Goal Description Patient will increase vocal loudness during narrative reading and semi-spontaneous " conversation tasks to a minimum range of 70-75 dB at 30 cm given min cues in order to improve vocal loudness for daily communication tasks.   Target Date 11/19/21   Date Met  10/18/21   Progress (detail required for progress note):  Good, goal met.  Patient demonstrates an average 73 dB for semi-spontaneous conversation and an average 71 dB for extended narrative reading given min cues.     Goal Identifier Voice quality   Goal Description In a 20-minute speech task, patient will demonstrate roughness, breathiness, and hypernasality that do not exceed a level of 2 out of 10, 90% of the time by SLP judgment, so that patient is able to meet his voice quality demands.   Target Date 11/19/21   Date Met      Progress (detail required for progress note):  Goal met for roughness and hypernasality, not met for breathiness.  Pt demonstrating breathiness that increases with voice use throughout the session.  Breathiness does not exceed a level 2 out of 10 about 70% of the time given min cues to use voice strategies.         Plan:  Discharge from therapy.  Patient has met his Breath and Volume goals during the current reporting period with skilled speech therapy and regular completion of a home exercise program.  He continues to demonstrate some vocal fatigue and deterioration of voice quality with voice use, however.  Patient will be leaving [place] to spend the winter in [place] soon, and will maintain a regular home regimen of practice of therapy exercises while he is away to maintain therapy gains and continue progress toward his voice quality goal.    Discharge:    Reason for Discharge: Patient has met 2/3 goals and will be leaving [place] soon to spend the winter in [place].      Discharge Plan: Patient to continue home program and will return for follow-up after he returns to [place] in spring 2022.      Allison Alpers Ackmann, B.A. (music), M.A., CCC-SLP  Speech-Language Pathologist  Certificate of Vocology  M  [facility]  [phone]

## 2021-10-19 ENCOUNTER — TRANSFERRED RECORDS (OUTPATIENT)
Dept: HEALTH INFORMATION MANAGEMENT | Facility: CLINIC | Age: 86
End: 2021-10-19
Payer: MEDICARE

## 2021-10-20 ENCOUNTER — OFFICE VISIT (OUTPATIENT)
Dept: NEUROLOGY | Facility: CLINIC | Age: 86
End: 2021-10-20
Attending: PSYCHIATRY & NEUROLOGY
Payer: MEDICARE

## 2021-10-20 VITALS
HEIGHT: 70 IN | WEIGHT: 198 LBS | HEART RATE: 60 BPM | SYSTOLIC BLOOD PRESSURE: 117 MMHG | BODY MASS INDEX: 28.35 KG/M2 | DIASTOLIC BLOOD PRESSURE: 70 MMHG | RESPIRATION RATE: 16 BRPM

## 2021-10-20 DIAGNOSIS — G21.4 VASCULAR PARKINSONISM (H): Primary | ICD-10-CM

## 2021-10-20 DIAGNOSIS — R41.9 COGNITIVE COMPLAINTS: ICD-10-CM

## 2021-10-20 PROCEDURE — 99214 OFFICE O/P EST MOD 30 MIN: CPT | Performed by: PSYCHIATRY & NEUROLOGY

## 2021-10-20 PROCEDURE — G0463 HOSPITAL OUTPT CLINIC VISIT: HCPCS

## 2021-10-20 ASSESSMENT — MIFFLIN-ST. JEOR: SCORE: 1594.37

## 2021-10-20 NOTE — PATIENT INSTRUCTIONS
AFTER VISIT SUMMARY (AVS):    At today's visit we thoroughly discussed current symptoms, needed evaluation, available treatment options, and the plan.    Please complete recommended laboratory work-up (at your primary care provider clinic) and neuropsychology evaluation (please call at [phone] to schedule).    No medication changes.    Next follow-up appointment is in the next 6 months or earlier if needed.    Please do not hesitate to call me with any questions or concerns.    Thanks.

## 2021-10-20 NOTE — LETTER
"    10/20/2021         RE: Leonard Garza  [address] [address]        Dear Colleague,    Thank you for referring your patient, Leonard Garza, to the [facility] [place]. Please see a copy of my visit note below.    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  Last visit was on 7/23/2021.  At that time, additional laboratory work-up was ordered.  The patient was referred to neuropsychology evaluation.  Please refer to my initial/other prior notes for further information.  The patient is accompanied by his wife, who participates in the interview today.    Since the last visit, the patient reports that his memory is not better.  Last night he saw \"people that were not there\" while watching TV.  Symptoms quickly disappeared after he covered his left eye.  That was the only instance without recurrence.  He found a [employer] program beneficial (just finished).  Speech therapist recommended another session in approximately 6 months from now.  He denies interval development of new focal neurological symptoms.    According to patient's wife, cognitive changes are stable.  The patient has difficulty getting in and out of car, dressing, and walking at times.  Bystanders commented that he might have Parkinson's disease.  She has no other concerns.    Laboratory and neuropsychology evaluation were not done.    On review of systems, patient endorses no other active complaints. Medications, allergies, family and social history were also reviewed.  He is planning to spend winter " "in [place] leaving on January 1 and returning back to [place] on May 1.  There are no changes reported by patient.  REVIEW OF SYSTEMS:                                                    10-system review was completed. Pertinent positives are included in HPI. The remainder of ROS is negative.  EXAM:                                                    Physical Exam:   Vitals: /70   Pulse 60   Resp 16   Ht 1.778 m (5' 10\")   Wt 89.8 kg (198 lb)   BMI 28.41 kg/m      General: pt is in NAD, cooperative.  Skin: normal turgor, moist mucous membranes, no lesions/rashes noticed.  HEENT: ATNC, white sclera, normal conjunctiva.  Respiratory: Symmetric lung excursion, no accessory respiratory muscle use.  Abdomen: Non distended.  Neurological: awake, cooperative, follows commands, mild hypomimia, hearing is reduced, mild hypophonia, tone increases with provoking maneuvers, left more than right, no tremor, mildly stooped forward posture with slightly decreased stride length and reduced arm swings.  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  He reports that his symptoms are stable, as evidenced on his exam.  He had an episode of brief visual hallucinations that might be suggestive of Lewy body dementia.  We will monitor.  For his cognitive complaints, I ordered additional labs and neuropsychology evaluation, but they were not done.  I encouraged the patient to do so.  We will help him to arrange.  In the past we discussed retrial of Sinemet (caused intolerable nausea) versus a trial of amantadine or dopamine agonists.  Given that his symptoms are stable, I do not think that we need to do it now, but will consider in the future.    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 Cognitive complaints  " R41.9      Plan: At today's visit we thoroughly discussed current symptoms, needed evaluation, available treatment options, and the plan.    I advised the patient to complete recommended laboratory work-up (at his primary care provider clinic) and neuropsychology evaluation (advised him to call at [phone] to schedule).    No medication changes.    Next follow-up appointment is in the next 6 months or earlier if needed.    Total Time: 31 minutes spent on the date of the encounter doing chart review, history and exam, documentation and further activities per the note.    Vasu Georges MD  [facility] Neu[facility]  (Chart documentation was completed in part with Dragon voice-recognition software. Even though reviewed, some grammatical, spelling, and word errors may remain.)      Again, thank you for allowing me to participate in the care of your patient.        Sincerely,        Vasu Georges MD

## 2021-10-20 NOTE — PROGRESS NOTES
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  Last visit was on 7/23/2021.  At that time, additional laboratory work-up was ordered.  The patient was referred to neuropsychology evaluation.  Please refer to my initial/other prior notes for further information.  The patient is accompanied by his wife, who participates in the interview today.    Since the last visit, the patient reports that his memory is not better.  Last night he saw \"people that were not there\" while watching TV.  Symptoms quickly disappeared after he covered his left eye.  That was the only instance without recurrence.  He found a [employer] program beneficial (just finished).  Speech therapist recommended another session in approximately 6 months from now.  He denies interval development of new focal neurological symptoms.    According to patient's wife, cognitive changes are stable.  The patient has difficulty getting in and out of car, dressing, and walking at times.  Bystanders commented that he might have Parkinson's disease.  She has no other concerns.    Laboratory and neuropsychology evaluation were not done.    On review of systems, patient endorses no other active complaints. Medications, allergies, family and social history were also reviewed.  He is planning to spend winter in [place] leaving on January 1 and returning back to [place] on May 1.  There are no changes reported by patient.  REVIEW OF SYSTEMS:                                                    10-system review was completed. Pertinent positives are included in " "HPI. The remainder of ROS is negative.  EXAM:                                                    Physical Exam:   Vitals: /70   Pulse 60   Resp 16   Ht 1.778 m (5' 10\")   Wt 89.8 kg (198 lb)   BMI 28.41 kg/m      General: pt is in NAD, cooperative.  Skin: normal turgor, moist mucous membranes, no lesions/rashes noticed.  HEENT: ATNC, white sclera, normal conjunctiva.  Respiratory: Symmetric lung excursion, no accessory respiratory muscle use.  Abdomen: Non distended.  Neurological: awake, cooperative, follows commands, mild hypomimia, hearing is reduced, mild hypophonia, tone increases with provoking maneuvers, left more than right, no tremor, mildly stooped forward posture with slightly decreased stride length and reduced arm swings.  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  He reports that his symptoms are stable, as evidenced on his exam.  He had an episode of brief visual hallucinations that might be suggestive of Lewy body dementia.  We will monitor.  For his cognitive complaints, I ordered additional labs and neuropsychology evaluation, but they were not done.  I encouraged the patient to do so.  We will help him to arrange.  In the past we discussed retrial of Sinemet (caused intolerable nausea) versus a trial of amantadine or dopamine agonists.  Given that his symptoms are stable, I do not think that we need to do it now, but will consider in the future.    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 Cognitive complaints  R41.9      Plan: At today's visit we thoroughly discussed current symptoms, needed evaluation, available treatment options, and the plan.    I advised the patient to complete recommended laboratory work-up (at his primary care provider clinic) and " neuropsychology evaluation (advised him to call at [phone] to schedule).    No medication changes.    Next follow-up appointment is in the next 6 months or earlier if needed.    Total Time: 31 minutes spent on the date of the encounter doing chart review, history and exam, documentation and further activities per the note.    Vasu Georges MD  [facility]  (Chart documentation was completed in part with Dragon voice-recognition software. Even though reviewed, some grammatical, spelling, and word errors may remain.)

## 2021-10-25 ENCOUNTER — HOSPITAL ENCOUNTER (EMERGENCY)
Facility: CLINIC | Age: 86
Discharge: HOME | End: 2021-10-25
Attending: EMERGENCY MEDICINE | Admitting: EMERGENCY MEDICINE
Payer: MEDICARE

## 2021-10-25 ENCOUNTER — APPOINTMENT (OUTPATIENT)
Dept: GENERAL RADIOLOGY | Facility: CLINIC | Age: 86
End: 2021-10-25
Attending: EMERGENCY MEDICINE
Payer: MEDICARE

## 2021-10-25 VITALS
TEMPERATURE: 97 F | HEART RATE: 66 BPM | RESPIRATION RATE: 16 BRPM | DIASTOLIC BLOOD PRESSURE: 56 MMHG | SYSTOLIC BLOOD PRESSURE: 110 MMHG | OXYGEN SATURATION: 99 %

## 2021-10-25 DIAGNOSIS — S20.212A CONTUSION OF LEFT CHEST WALL, INITIAL ENCOUNTER: ICD-10-CM

## 2021-10-25 PROBLEM — I63.50 CEREBRAL ARTERY OCCLUSION WITH CEREBRAL INFARCTION (H): Status: ACTIVE | Noted: 2021-10-25

## 2021-10-25 PROBLEM — G20.A1 PARKINSON'S DISEASE (H): Status: ACTIVE | Noted: 2019-05-06

## 2021-10-25 PROBLEM — Z98.890 H/O CAROTID ENDARTERECTOMY: Status: ACTIVE | Noted: 2020-03-11

## 2021-10-25 PROCEDURE — 250N000013 HC RX MED GY IP 250 OP 250 PS 637: Performed by: EMERGENCY MEDICINE

## 2021-10-25 PROCEDURE — 99283 EMERGENCY DEPT VISIT LOW MDM: CPT

## 2021-10-25 PROCEDURE — 71101 X-RAY EXAM UNILAT RIBS/CHEST: CPT | Mod: LT

## 2021-10-25 RX ORDER — LIDOCAINE 4 G/G
1 PATCH TOPICAL EVERY 24 HOURS
Qty: 5 PATCH | Refills: 0 | Status: SHIPPED | OUTPATIENT
Start: 2021-10-25 | End: 2021-10-30

## 2021-10-25 RX ORDER — LIDOCAINE 4 G/G
1 PATCH TOPICAL ONCE
Status: DISCONTINUED | OUTPATIENT
Start: 2021-10-25 | End: 2021-10-25 | Stop reason: HOSPADM

## 2021-10-25 RX ADMIN — LIDOCAINE 1 PATCH: 246 PATCH TOPICAL at 09:52

## 2021-10-25 ASSESSMENT — ENCOUNTER SYMPTOMS
COLOR CHANGE: 0
NECK PAIN: 0
SHORTNESS OF BREATH: 0
NECK STIFFNESS: 0
LIGHT-HEADEDNESS: 0
VOMITING: 0
COUGH: 0
FEVER: 0
ABDOMINAL PAIN: 0
WOUND: 0
CHILLS: 0

## 2021-10-25 NOTE — ED PROVIDER NOTES
History     Chief Complaint:  Fall    HPI   Leonard Garza is a 86 year old male with history of vascular Parkinson's disease, gait instability, falls who presents with left posterior chest wall pain following a fall 3 days ago.  The patient describes getting out of his car 3 days ago.  He lost his balance, and fell back against the car after trying to walk up 2 steps.  He hit his posterior left back and chest.  He slid down the car, and laid flat on his back.  His neighbor, who is a physician that lives next door, helped the patient back into the house with the help of his wife.  The patient denies head injury or loss of consciousness.  He has full range of motion in the left arm, without any numbness or weakness.  He denies any midline neck or upper back pain.  He denies any other injuries or pain.  He has been taking Tylenol and ibuprofen as needed for symptoms.    Review of Systems   Constitutional: Negative for chills and fever.   Respiratory: Negative for cough and shortness of breath.    Gastrointestinal: Negative for abdominal pain and vomiting.   Musculoskeletal: Negative for neck pain and neck stiffness.   Skin: Negative for color change and wound.   Neurological: Negative for syncope and light-headedness.   All other systems reviewed and are negative.    Allergies:    Dopamine  Hay Fever & [A.R.M.]  Ragweeds      Medications:      aspirin (ASPIRIN 81) 81 MG chewable tablet  atorvastatin (LIPITOR) 20 MG tablet  calcium-vitamin D (CALTRATE) 600-400 MG-UNIT per tablet  irbesartan-hydrochlorothiazide (AVALIDE) 150-12.5 MG tablet        Past Medical History:      Past Medical History:   Diagnosis Date     Basal cell cancer 10/6/2010     Basal cell cancer 10/6/2010     CKD (chronic kidney disease) stage 3, GFR 30-59 ml/min (H) 6/13/2011     Hypertension      Inguinal hernia 10/6/2010     PERIPH VASCULAR DIS NOS 8/17/2006     Unspecified cerebral artery occlusion with cerebral infarction      Past Surgical  History:      Past Surgical History:   Procedure Laterality Date     CYSTOSCOPY, LITHOLAPAXY, COMBINED N/A 11/14/2016    Procedure: COMBINED CYSTOSCOPY, LITHOLAPAXY;  Surgeon: Ceasar Silverio MD;  Location: [facility] OR     CYSTOSCOPY, TRANSURETHRAL RESECTION (TUR) PROSTATE, COMBINED N/A 11/14/2016    Procedure: COMBINED CYSTOSCOPY, TRANSURETHRAL RESECTION (TUR) PROSTATE;  Surgeon: Ceasar Silverio MD;  Location: [facility] OR     ENT SURGERY      nasal septoplasty for fx x3     GENITOURINARY SURGERY      remove testicle     HERNIORRHAPHY INGUINAL  12/11/2012    Procedure: HERNIORRHAPHY INGUINAL;  Right Inguinal Hernia Repair with Mesh, Right Hydrocelectomy;  Surgeon: Miles Redd MD;  Location: [facility] OR     HYDROCELECTOMY INGUINAL  12/11/2012    Procedure: HYDROCELECTOMY INGUINAL;;  Surgeon: Ceasar Silverio MD;  Location: [facility] OR     ORTHOPEDIC SURGERY      shoulder reduction for dislocation x3     VASCULAR SURGERY      carotid endarderectomy       Family History:      Family History   Problem Relation Age of Onset     Neurologic Disorder Brother         PARKINSONISM AGE 73     Hypertension Mother        Social History:  Accompanied by wife.    Physical Exam     Patient Vitals for the past 24 hrs:   BP Temp Temp src Pulse Resp SpO2   10/25/21 0743 101/57 97  F (36.1  C) Temporal 67 18 98 %       Physical Exam  General: alert, laying on gurney, no distress  HENT: mucous membranes moist  Head: Atraumatic.  No bruising or laceration.  CV: regular rate, regular rhythm  Resp: normal effort, clear throughout, no crackles or wheezing  GI: abdomen soft and nontender, no guarding  MSK: no bony tenderness.  No crepitus or tenderness to  Posterior chest wall on the left.  No midline cervical, thoracic, or lumbar spine tenderness.  Full range of motion in bilateral upper and lower extremities.  Skin: appropriately warm and dry.  No bruising or laceration to chest.  Neuro: alert, clear speech, oriented.  Hard of hearing.  Symmetric  smile.  Strength 5 out of 5 bilateral upper extremities, bilateral lower extremities.  Negative pronator drift.  Psych: normal mood and affect    Emergency Department Course   Imaging:  Ribs XR, unilat 3 views + PA chest,  left   Final Result   IMPRESSION: No apparent left rib displaced fracture. The lungs appear   clear. No apparent pneumothorax or pleural effusion.      SHARON BOCANEGRA MD            SYSTEM ID:  [id number]          Laboratory:  Labs Ordered and Resulted from Time of ED Arrival Up to the Time of Departure from the ED - No data to display    Emergency Department Course:    Reviewed:  I reviewed nursing notes, vitals and past history    Assessments:  0741 I obtained history and examined the patient as noted above.   1015 I rechecked the patient and explained findings.       Interventions:    Medications   Lidocaine (LIDOCARE) 4 % Patch 1 patch (1 patch Transdermal Patch/Med Applied 10/25/21 0952)   lidocaine patch in PLACE (has no administration in time range)       Disposition:  The patient was discharged to home.    Impression & Plan    Medical Decision Making:  Leonard Garza is a 86 year old male with history of vascular Parkinson's disease, gait instability, falls who presents with left posterior chest wall pain following a fall 3 days ago.  On exam, he has normal vitals.  No signs of head or spine injury.  Skin is without bruising, no evidence of crepitus or fracture on exam.  Lungs are clear, no clinical evidence to suggest pneumothorax.  Patient is not currently anticoagulated.  X-rays of the chest were obtained, which are negative for fracture.  Given that he has painless range of motion in the shoulder, I do not suspect shoulder injury.  I recommend continued Tylenol, ibuprofen as directed, lidocaine patches as well to help with the pain.  Otherwise, return to the ED for worsening pain, new symptoms such as numbness or weakness, or for other concerns.    Diagnosis:    ICD-10-CM    1.  Contusion of left chest wall, initial encounter  S20.212A        Discharge Medications:  New Prescriptions    LIDOCAINE (LIDOCARE) 4 % PATCH    Place 1 patch onto the skin every 24 hours for 5 days To prevent lidocaine toxicity, patient should be patch free for 12 hrs daily.         Scribe Disclosure:  Coby IRVIN MD, am serving as a scribe at 9:27 AM on 10/25/2021 to document services personally performed by Coby Nicholas MD based on my observations and the provider's statements to me.      Coby Nicholas MD  10/25/21 1028

## 2021-10-25 NOTE — ED TRIAGE NOTES
Fell backwards onto car hitting his back. Slid down the car, no head injury. Happened three days ago. Hx of Parkinson's disease. No neck pain. No LOC

## 2021-10-25 NOTE — DISCHARGE INSTRUCTIONS
Discharge Instructions  Chest Injury    You have been seen today because of a chest injury.  You may have contusion (bruise) of the chest or a rib fracture (broken bone).  Rib fractures can be hard to see on x-ray, so we cannot always be sure whether your rib is broken or bruised. Fortunately, the treatment of these injuries is usually the same, and includes pain control and preventing complications.    Generally, every Emergency Department visit should have a follow-up clinic visit with either a primary or a specialty clinic/provider. Please follow-up as instructed by your emergency provider today.    Return to the Emergency Department if:  You become short of breath.  You develop a fever over 101.5 F.  You pass out or become very weak or pale.  You have abdominal (belly) pain that is new or increasing.  You cough up blood.  You have new symptoms or anything that worries you.    Follow-up with your provider:  As directed by your provider today.  If you are not improved in two weeks.  If you need more pain medicine, since we do not refill pain pills through the Emergency Department.    Home care instructions:  Chest injuries can be painful.  You may take an over-the-counter pain medication such as Tylenol  (acetaminophen), Advil  (ibuprofen), Motrin  (ibuprofen) or Aleve  (naproxen).  Applying ice packs to the painful area can help your pain.   Holding a pillow against your chest can help with pain when you need to move or cough.  You may need to rest and avoid lifting particularly in the first few days after your injury.  Prevention of pneumonia (lung infection) is also a part of managing chest injuries.  Because it can hurt to take deep breaths, you could develop collapsed areas of lung that can develop infection.  To prevent this, you need to take ten very deep breaths every hour while you are awake. Sometimes you will be given a device called an incentive spirometer to help with this. You also need to make  yourself cough every hour.  Rib belts or binders are not generally recommended, since they may increase the risk of pneumonia. If you do use one, use it for only short periods of time.   If you were given a prescription for medicine here today, be sure to read all of the information (including the package insert) that comes with your prescription.  This will include important information about the medicine, its side effects, and any warnings that you need to know about.  The pharmacist who fills the prescription can provide more information and answer questions you may have about the medicine.  If you have questions or concerns that the pharmacist cannot address, please call or return to the Emergency Department.   Remember that you can always come back to the Emergency Department if you are not able to see your regular provider in the amount of time listed above, if you get any new symptoms, or if there is anything that worries you.

## 2021-11-11 ENCOUNTER — OFFICE VISIT (OUTPATIENT)
Dept: INTERNAL MEDICINE | Facility: CLINIC | Age: 86
End: 2021-11-11
Payer: MEDICARE

## 2021-11-11 VITALS
HEART RATE: 75 BPM | TEMPERATURE: 97.6 F | BODY MASS INDEX: 28.03 KG/M2 | DIASTOLIC BLOOD PRESSURE: 76 MMHG | HEIGHT: 70 IN | RESPIRATION RATE: 16 BRPM | WEIGHT: 195.8 LBS | SYSTOLIC BLOOD PRESSURE: 124 MMHG | OXYGEN SATURATION: 98 %

## 2021-11-11 DIAGNOSIS — R60.9 EDEMA, UNSPECIFIED TYPE: ICD-10-CM

## 2021-11-11 DIAGNOSIS — Z12.5 SCREENING PSA (PROSTATE SPECIFIC ANTIGEN): ICD-10-CM

## 2021-11-11 DIAGNOSIS — Z00.00 ENCOUNTER FOR SUBSEQUENT ANNUAL WELLNESS VISIT IN MEDICARE PATIENT: Primary | ICD-10-CM

## 2021-11-11 DIAGNOSIS — I10 ESSENTIAL HYPERTENSION, BENIGN: ICD-10-CM

## 2021-11-11 DIAGNOSIS — G20.A1 PARKINSON'S DISEASE (H): ICD-10-CM

## 2021-11-11 DIAGNOSIS — I63.50 CEREBRAL ARTERY OCCLUSION WITH CEREBRAL INFARCTION (H): ICD-10-CM

## 2021-11-11 DIAGNOSIS — Z12.11 COLON CANCER SCREENING: ICD-10-CM

## 2021-11-11 DIAGNOSIS — Z13.6 CARDIOVASCULAR SCREENING; LDL GOAL LESS THAN 100: ICD-10-CM

## 2021-11-11 LAB
ALBUMIN SERPL-MCNC: 3.6 G/DL (ref 3.4–5)
ALP SERPL-CCNC: 154 U/L (ref 40–150)
ALT SERPL W P-5'-P-CCNC: 18 U/L (ref 0–70)
ANION GAP SERPL CALCULATED.3IONS-SCNC: 8 MMOL/L (ref 3–14)
AST SERPL W P-5'-P-CCNC: 15 U/L (ref 0–45)
BILIRUB SERPL-MCNC: 0.6 MG/DL (ref 0.2–1.3)
BUN SERPL-MCNC: 33 MG/DL (ref 7–30)
CALCIUM SERPL-MCNC: 9.1 MG/DL (ref 8.5–10.1)
CHLORIDE BLD-SCNC: 109 MMOL/L (ref 94–109)
CHOLEST SERPL-MCNC: 139 MG/DL
CO2 SERPL-SCNC: 22 MMOL/L (ref 20–32)
CREAT SERPL-MCNC: 1.46 MG/DL (ref 0.66–1.25)
ERYTHROCYTE [DISTWIDTH] IN BLOOD BY AUTOMATED COUNT: 13.6 % (ref 10–15)
FASTING STATUS PATIENT QL REPORTED: YES
GFR SERPL CREATININE-BSD FRML MDRD: 44 ML/MIN/1.73M2
GLUCOSE BLD-MCNC: 100 MG/DL (ref 70–99)
HCT VFR BLD AUTO: 41.8 % (ref 40–53)
HDLC SERPL-MCNC: 58 MG/DL
HGB BLD-MCNC: 13.6 G/DL (ref 13.3–17.7)
LDLC SERPL CALC-MCNC: 68 MG/DL
MCH RBC QN AUTO: 30.4 PG (ref 26.5–33)
MCHC RBC AUTO-ENTMCNC: 32.5 G/DL (ref 31.5–36.5)
MCV RBC AUTO: 94 FL (ref 78–100)
NONHDLC SERPL-MCNC: 81 MG/DL
PLATELET # BLD AUTO: 244 10E3/UL (ref 150–450)
POTASSIUM BLD-SCNC: 4.3 MMOL/L (ref 3.4–5.3)
PROT SERPL-MCNC: 7.4 G/DL (ref 6.8–8.8)
PSA SERPL-MCNC: 2.09 UG/L (ref 0–4)
RBC # BLD AUTO: 4.47 10E6/UL (ref 4.4–5.9)
SODIUM SERPL-SCNC: 139 MMOL/L (ref 133–144)
TRIGL SERPL-MCNC: 64 MG/DL
WBC # BLD AUTO: 7.9 10E3/UL (ref 4–11)

## 2021-11-11 PROCEDURE — 85027 COMPLETE CBC AUTOMATED: CPT | Performed by: INTERNAL MEDICINE

## 2021-11-11 PROCEDURE — 80061 LIPID PANEL: CPT | Performed by: INTERNAL MEDICINE

## 2021-11-11 PROCEDURE — G0439 PPPS, SUBSEQ VISIT: HCPCS | Performed by: INTERNAL MEDICINE

## 2021-11-11 PROCEDURE — 36415 COLL VENOUS BLD VENIPUNCTURE: CPT | Performed by: INTERNAL MEDICINE

## 2021-11-11 PROCEDURE — 80053 COMPREHEN METABOLIC PANEL: CPT | Performed by: INTERNAL MEDICINE

## 2021-11-11 PROCEDURE — G0103 PSA SCREENING: HCPCS | Performed by: INTERNAL MEDICINE

## 2021-11-11 RX ORDER — ATORVASTATIN CALCIUM 20 MG/1
20 TABLET, FILM COATED ORAL DAILY
Qty: 90 TABLET | Refills: 1 | Status: SHIPPED | OUTPATIENT
Start: 2021-11-11 | End: 2022-08-12

## 2021-11-11 RX ORDER — IRBESARTAN AND HYDROCHLOROTHIAZIDE 150; 12.5 MG/1; MG/1
1 TABLET, FILM COATED ORAL DAILY
Qty: 90 TABLET | Refills: 1 | Status: SHIPPED | OUTPATIENT
Start: 2021-11-11 | End: 2022-08-12

## 2021-11-11 ASSESSMENT — ACTIVITIES OF DAILY LIVING (ADL): CURRENT_FUNCTION: NO ASSISTANCE NEEDED

## 2021-11-11 ASSESSMENT — ENCOUNTER SYMPTOMS
SORE THROAT: 0
MYALGIAS: 1
ABDOMINAL PAIN: 0
HEARTBURN: 1
DYSURIA: 0
SHORTNESS OF BREATH: 0
WEAKNESS: 0
CHILLS: 0
PALPITATIONS: 0
EYE PAIN: 0
ARTHRALGIAS: 0
FEVER: 0
NAUSEA: 0
HEMATURIA: 0
HEADACHES: 0
HEMATOCHEZIA: 0
DIZZINESS: 1
CONSTIPATION: 1
NERVOUS/ANXIOUS: 0
FREQUENCY: 1
PARESTHESIAS: 0
JOINT SWELLING: 0

## 2021-11-11 ASSESSMENT — MIFFLIN-ST. JEOR: SCORE: 1584.39

## 2021-11-11 NOTE — LETTER
[facility]  [address] [place] [address]  [phone]      11/11/2021       Leonard Garza  [address] [facility] [address]    Dear Hétcor,    Good to see you in the clinic and have a great holiday season.    I am pleased to inform you that your routine blood work including your hemoglobin, sodium, potassium and calcium function tests are all normal.    Your kidney function test remain slightly abnormal but stable and should be rechecked here in the clinic in 6 months with a follow-up visit.  Your values have remained stable.  It is important to make sure you keep yourself well-hydrated.  Sometimes discontinuing the diuretic within your blood pressure pill will also help improve your kidney function test.  I would continue to follow this closely but if your kidney function did change we may want to consider having you see a nephrologist just for reassurance and/or consider discontinuing your hydrochlorothiazide dose.    Your blood sugar function test is just slightly abnormal and elevated and should be rechecked here in the clinic in 6-12 months with a follow-up visit with me, araseli.  In the meantime, please work on your diet limiting your carbohydrates and getting some good, regular exercise.    One of your liver function tests, your alkaline phosphatase level, is just slightly abnormal and should be rechecked here in the clinic in 3 month with a follow-up lab visit.  As you know this is a nonspecific test result and is only just a couple points above the normal range but should be followed for reassurance.    Your cholesterol looks good and I would not change anything at this point but would repeat your labs in 12 months.    In addition, your PSA or prostate screening antigen is stable and should be repeated annually.    Sincerely,      Gian Fuller MD  Internal Medicine

## 2021-11-11 NOTE — PROGRESS NOTES
"SUBJECTIVE:   Leonard Garza is a 86 year old male who presents for Preventive Visit.  Patient has been advised of split billing requirements and indicates understanding: Yes   Are you in the first 12 months of your Medicare coverage?  No    Healthy Habits:     In general, how would you rate your overall health?  Good    Frequency of exercise:  4-5 days/week    Duration of exercise:  15-30 minutes    Do you usually eat at least 4 servings of fruit and vegetables a day, include whole grains    & fiber and avoid regularly eating high fat or \"junk\" foods?  No    Taking medications regularly:  Yes    Medication side effects:  Not applicable and Lightheadedness    Ability to successfully perform activities of daily living:  No assistance needed    Home Safety:  Throw rugs in the hallway and lack of grab bars in the bathroom    Hearing Impairment:  Difficulty following a conversation in a noisy restaurant or crowded room, feel that people are mumbling or not speaking clearly, difficulty following dialogue in the theater, find that men's voices are easier to understand than woman's, difficulty understanding soft or whispered speech and difficulty understanding speech on the telephone    In the past 6 months, have you been bothered by leaking of urine?  No    In general, how would you rate your overall mental or emotional health?  Good      PHQ-2 Total Score: 0    Additional concerns today:  No    Do you feel safe in your environment? Yes    Have you ever done Advance Care Planning? (For example, a Health Directive, POLST, or a discussion with a medical provider or your loved ones about your wishes): No, advance care planning information given to patient to review.  Advanced care planning was discussed at today's visit.       Fall risk: mild    Cognitive Screening   1) Repeat 3 items (Leader, Season, Table)    2) Clock draw: NORMAL  3) 3 item recall: Recalls 2 objects   Results: NORMAL clock, 1-2 items recalled: COGNITIVE " IMPAIRMENT LESS LIKELY    Mini-CogTM Copyright NUZHAT Harris. Licensed by the author for use in [facility]; reprinted with permission (sozackary@.[facility]). All rights reserved.      Do you have sleep apnea, excessive snoring or daytime drowsiness?: no    Reviewed and updated as needed this visit by clinical staff                Reviewed and updated as needed this visit by Provider               Social History     Tobacco Use     Smoking status: Former Smoker     Smokeless tobacco: Never Used     Tobacco comment: quit 40 yrs ago   Substance Use Topics     Alcohol use: No     Comment: social         Alcohol Use 10/15/2014   Prescreen: >3 drinks/day or >7 drinks/week? The patient does not drink >3 drinks per day nor >7 drinks per week.       Current providers sharing in care for this patient include:   Patient Care Team:  Gian Fuller MD as PCP - General  Gian Fuller MD as Assigned PCP  Vasu Goerges MD as Assigned Neuroscience Provider    The following health maintenance items are reviewed in Epic and correct as of today:  Health Maintenance Due   Topic Date Due     ANNUAL REVIEW OF  ORDERS  Never done     COLORECTAL CANCER SCREENING  12/19/2013     MEDICARE ANNUAL WELLNESS VISIT  08/02/2019     PHQ-2  01/01/2021     FALL RISK ASSESSMENT  08/11/2021     INFLUENZA VACCINE (1) 09/01/2021     COVID-19 Vaccine (2 - Pfizer 3-dose booster series) 09/29/2021       Immunization History   Administered Date(s) Administered     COVID-19,PF,Pfizer (12+ Yrs) 09/08/2021     HEPA 10/02/2009     HepB 10/02/2009     Influenza (High Dose) 3 valent vaccine 11/05/2015, 11/28/2016, 12/18/2017, 10/05/2018, 12/15/2020     Influenza (IIV3) PF 10/01/2013     Pneumo Conj 13-V (2010&after) 11/05/2015     Pneumococcal 23 valent 05/12/1999, 12/29/2004     TD (ADULT, 7+) 10/02/2009, 12/15/2020     Tetanus 05/01/2004     Typhoid Oral 10/02/2009     Zoster vaccine recombinant adjuvanted (SHINGRIX) 10/04/2020,  "11/01/2020             Review of Systems   Constitutional: Negative for chills and fever.   HENT: Positive for hearing loss. Negative for congestion, ear pain and sore throat.    Eyes: Negative for pain and visual disturbance.   Respiratory: Negative for shortness of breath.    Cardiovascular: Positive for peripheral edema. Negative for chest pain and palpitations.   Gastrointestinal: Positive for constipation and heartburn. Negative for abdominal pain, hematochezia and nausea.   Genitourinary: Positive for frequency. Negative for dysuria, genital sores, hematuria, impotence, penile discharge and urgency.   Musculoskeletal: Positive for myalgias. Negative for arthralgias and joint swelling.   Skin: Negative for rash.   Neurological: Positive for dizziness. Negative for weakness, headaches and paresthesias.   Psychiatric/Behavioral: Negative for mood changes. The patient is not nervous/anxious.        OBJECTIVE:   /76   Pulse 75   Temp 97.6  F (36.4  C) (Temporal)   Resp 16   Ht 1.778 m (5' 10\")   Wt 88.8 kg (195 lb 12.8 oz)   SpO2 98%   BMI 28.09 kg/m   Estimated body mass index is 28.09 kg/m  as calculated from the following:    Height as of this encounter: 1.778 m (5' 10\").    Weight as of this encounter: 88.8 kg (195 lb 12.8 oz).     Physical Exam     GENERAL: alert and no distress  EYES: Eyes grossly normal to inspection, PERRL and conjunctivae and sclerae normal  HENT: HA in place, ear canals occluded with cerumen and was cleaned by me and TM's then normal, nose and mouth without ulcers or lesions  NECK: no adenopathy, no asymmetry, masses, or scars and thyroid normal to palpation, no carotid bruits.  RESP: lungs clear to auscultation - no rales, rhonchi or wheezes  CV: regular rate and rhythm, normal S1 S2, no S3 or S4, no click or rub  ABDOMEN: soft, nontender, no hepatosplenomegaly, no masses and bowel sounds normal  MS: no gross musculoskeletal defects noted, no edema  NEURO: There is some mild " baseline memory change with mild baseline imbalance.  Minimal tremor  PSYCH: mentation appears normal, affect normal/bright    ASSESSMENT / PLAN:     (Z00.00) Encounter for subsequent annual wellness visit in Medicare patient  (primary encounter diagnosis)  Comment: Benedicto advised keeping up-to-date on routine vaccinations  Plan: CBC with platelets, Comprehensive metabolic         panel, Lipid Profile            (G20) Parkinson's disease (H)  Comment: Patient has more parkinsonian syndrome.  No distinct tremor noted, following up with neurology  Plan:     (I10) Essential hypertension, benign  Comment: Stable on therapy continue with current medical management  Plan: Comprehensive metabolic panel,         irbesartan-hydrochlorothiazide (AVALIDE)         150-12.5 MG tablet            (I63.50) Cerebral artery occlusion with cerebral infarction (H)  Comment: Stable and modifying risk factors.  No focal changes noted on exam  Plan:     (Z13.6) CARDIOVASCULAR SCREENING; LDL GOAL LESS THAN 100  Comment: Labs ordered as fasting per routine, continuing with statin therapy  Plan: Lipid Profile, atorvastatin (LIPITOR) 20 MG         tablet            (Z12.5) Screening PSA (prostate specific antigen)  Comment: Ordered as routine screening per patient request.  Plan: PSA, screen            (Z12.11) Colon cancer screening  Comment: Suggest annual FIT testing  Plan: Fecal colorectal cancer screen FIT            (R60.9) Edema, unspecified type  Comment: Stable and continue with current diuretic component of blood pressure management  Plan: irbesartan-hydrochlorothiazide (AVALIDE)         150-12.5 MG tablet              Patient has been advised of split billing requirements and indicates understanding: Yes  COUNSELING:  Reviewed preventive health counseling, as reflected in patient instructions       Regular exercise       Healthy diet/nutrition    Estimated body mass index is 28.41 kg/m  as calculated from the following:    Height  "as of 10/20/21: 1.778 m (5' 10\").    Weight as of 10/20/21: 89.8 kg (198 lb).      He reports that he has quit smoking. He has never used smokeless tobacco.      Appropriate preventive services were discussed with this patient, including applicable screening as appropriate for cardiovascular disease, diabetes, osteopenia/osteoporosis, and glaucoma.  As appropriate for age/gender, discussed screening for colorectal cancer, prostate cancer, breast cancer, and cervical cancer. Checklist reviewing preventive services available has been given to the patient.    Reviewed patients plan of care and provided an AVS. The Basic Care Plan (routine screening as documented in Health Maintenance) for Leonard meets the Care Plan requirement. This Care Plan has been established and reviewed with the Patient.    Counseling Resources:  ATP IV Guidelines  Pooled Cohorts Equation Calculator  Breast Cancer Risk Calculator  Breast Cancer: Medication to Reduce Risk  FRAX Risk Assessment  ICSI Preventive Guidelines  Dietary Guidelines for Americans, 2010  [employer] MyPlate  ASA Prophylaxis  Lung CA Screening    Gian Fuller MD  [facility]    Identified Health Risks:  "

## 2021-11-22 ENCOUNTER — TRANSFERRED RECORDS (OUTPATIENT)
Dept: HEALTH INFORMATION MANAGEMENT | Facility: CLINIC | Age: 86
End: 2021-11-22
Payer: MEDICARE

## 2022-04-07 ENCOUNTER — TELEPHONE (OUTPATIENT)
Dept: NEUROLOGY | Facility: CLINIC | Age: 87
End: 2022-04-07
Payer: MEDICARE

## 2022-04-07 NOTE — TELEPHONE ENCOUNTER
Received a call on an inside OB physician line from Leonard Garza.  He was verifying that the tests were ordered in the computer.    I reviewed that the tests are in Epic and will still be valid and visible at the time of his lab appointment.    Patient expresses understanding.   Yessenia Ureña CMA

## 2022-04-07 NOTE — TELEPHONE ENCOUNTER
Reviewed notes and Neuropsych testing is the last thing that needs to be completed.    Outbound call to Raquel [phone]. Left message with a reminder to get the labs Dr Garcia ordered complete at his clinic and then adv that the only other testing was the Neuropsychology appointment. Advised the referral is already in the system and he can call [phone] to schedule an appointment, also let er know sometimes they are schedule out quite a ways so just to take the next appointment that is available

## 2022-04-07 NOTE — TELEPHONE ENCOUNTER
Patient has been in [place] all winter and needs to schedule neurological testing that was referenced at last appointment.  Patient also states that the testing needed to be completed prior to the May 9th clinic appointment. Patient needs information on how to schedule testing.

## 2022-04-07 NOTE — TELEPHONE ENCOUNTER
Outbound call to Miguel [phone]. Spoke with him and advised the labs were in his chart and can be easily accessed buy the staff at [facility] or which ever clinic he chose to go to

## 2022-04-07 NOTE — TELEPHONE ENCOUNTER
[facility] Call Center    Phone Message    May a detailed message be left on voicemail: yes     Reason for Call: Other: Pt returning call from Dr. Georges's office.      Writer informed pt that he needed lab done first before seeing Dr. Georges on 05/09. Then to set up his appt for neuropsych.    Pt is wondering what labs he needs to do. He's also wondering if Dr. Georges can send the lab orders to his clinic so he can do them.    Please contact pt about the labs he has to do.  Ph: [phone]    Action Taken: Message routed to:  Other:  Neurology    Travel Screening: Not Applicable

## 2022-04-13 ENCOUNTER — TELEPHONE (OUTPATIENT)
Dept: NEUROPSYCHOLOGY | Facility: CLINIC | Age: 87
End: 2022-04-13
Payer: MEDICARE

## 2022-04-18 DIAGNOSIS — G21.4 VASCULAR PARKINSONISM (H): Primary | ICD-10-CM

## 2022-04-18 DIAGNOSIS — R41.9 COGNITIVE COMPLAINTS: ICD-10-CM

## 2022-05-09 ENCOUNTER — OFFICE VISIT (OUTPATIENT)
Dept: NEUROLOGY | Facility: CLINIC | Age: 87
End: 2022-05-09
Payer: MEDICARE

## 2022-05-09 VITALS — DIASTOLIC BLOOD PRESSURE: 78 MMHG | HEART RATE: 59 BPM | OXYGEN SATURATION: 98 % | SYSTOLIC BLOOD PRESSURE: 125 MMHG

## 2022-05-09 DIAGNOSIS — G21.4 VASCULAR PARKINSONISM (H): Primary | ICD-10-CM

## 2022-05-09 DIAGNOSIS — R47.1 DYSARTHRIA: ICD-10-CM

## 2022-05-09 DIAGNOSIS — R29.818 DIFFICULTY BALANCING: ICD-10-CM

## 2022-05-09 PROCEDURE — 99214 OFFICE O/P EST MOD 30 MIN: CPT | Performed by: PSYCHIATRY & NEUROLOGY

## 2022-05-09 ASSESSMENT — MONTREAL COGNITIVE ASSESSMENT (MOCA)
8. SERIAL SUBTRACTION OF 7S: 3
WHAT LEVEL OF EDUCATION WAS ATTAINED: 0
7. [VIGILENCE] TAP WHEN HEARING DESIGNATED LETTER: 1
6. READ LIST OF DIGITS [FORWARD/BACKWARD]: 2
10. [FLUENCY] NAME WORDS STARTING WITH DESIGNATED LETTER: 0
12. MEMORY INDEX SCORE: 3
WHAT IS THE TOTAL SCORE (OUT OF 30): 23
11. FOR EACH PAIR OF WORDS, WHAT CATEGORY DO THEY BELONG TO (OUT OF 2): 2
VISUOSPATIAL/EXECUTIVE SUBSCORE: 3
4. NAME EACH OF THE THREE ANIMALS SHOWN: 2
13. ORIENTATION SUBSCORE: 6
9. REPEAT EACH SENTENCE: 1

## 2022-05-09 NOTE — LETTER
2022         RE: Leonard Garza  [address] [facility] [address]        Dear Colleague,    Thank you for referring your patient, Leonard Garza, to the [facility] NEUROLOGY CLINICS [facility]. Please see a copy of my visit note below.    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  He was last seen on 10/20/2021.  At that time, I encouraged the patient to complete recommended laboratory work-up and neuropsychology evaluation.  Please refer to my initial/other prior notes for further information.  He is accompanied by wife, who participates in interview today.    Since the last visit, the patient reports that wife noted that his speech is not clear.  He continues to struggle with his balance.  His wife adds that the patient seems to be depressed and has lack of initiative, but the patient does not agree with this observation.  He denies interval development of new focal neurological symptoms.  Did not complete his labs.  Neuropsychology testing is scheduled on 2022.  EXAM:                                                    Physical Exam:   Vitals: /78 (BP Location: Right arm, Patient Position: Sitting, Cuff Size: Adult Regular)   Pulse 59   SpO2 98%   Bruce Cognitive Assessment:    [place] Cognitive Assessment (MOCA)  Visuospatial/Executive : 3  Namin  Attention - Digits: 2  Attention - Letters: 1  Attention - Subtraction: 3  Language - Repeat: 1  Language - Fluency : 0  Abstraction: 2  Delayed Recall: 3  Orientation: 6  Education: 0  MOCA  Score: 23  Administered by: : Lay GALDAMEZ     Bruce Cognitive Assessment Score:  MOCA Score: 23/30.     General: pt is in NAD, cooperative.  Skin: normal turgor, moist mucous membranes, no lesions/rashes noticed.  HEENT: ATNC, white sclera, normal conjunctiva.  Respiratory: Symmetric lung excursion, no accessory respiratory muscle use.  Abdomen: Non distended.  Neurological: awake, cooperative, follows commands, no exam changes compared to the last visit.  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  Reports speech difficulty and continued trouble with his balance.    His MoCA today is 23/30 (stable), compared to 24/30 at the initial visit.  He is scheduled to have neuropsychology evaluation at the end of August 2022.  We will review it at the next visit.      Previously, Sinemet caused significant nausea.  We discussed that it could be cautiously retried at the lower dose versus amantadine or dopamine agonists.  However, his parkinsonism symptoms seems to be stable, and I do not think that initiation of additional medications would be helpful at this point.      At the same time, he complains of dysarthria/speech difficulty and might benefit from speech therapy.  For his continued balance difficulty, I placed an order for balance PT.    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 Dysarthria  R47.1 Speech Therapy Referral   3. Difficulty balancing  R29.818 Physical Therapy Referral     Plan: At today's visit we thoroughly discussed current symptoms, available treatment options, and the plan.    We decided to try speech therapy for speech difficulty and physical therapy for balance difficulty.    I advised the patient to complete the recommended labs, as previously ordered.    I will see him after he  "completes neuropsychology testing that is currently scheduled at the end of August 2022.    Next follow-up appointment is in the next 4 months or earlier if needed.    Total Time: 31 minutes spent on the date of the encounter doing chart review, history and exam, documentation and further activities per the note.    Vasu Georges MD  [facility] Neurology  (Chart documentation was completed in part with Dragon voice-recognition software. Even though reviewed, some grammatical, spelling, and word errors may remain.)      Leonard Garza is a 87 year old male who presents for:  Chief Complaint   Patient presents with     Follow Up     Parkinson's/Memory         Initial Vitals:  /78 (BP Location: Right arm, Patient Position: Sitting, Cuff Size: Adult Regular)   Pulse 59   SpO2 98%  Estimated body mass index is 28.09 kg/m  as calculated from the following:    Height as of 11/11/21: 1.778 m (5' 10\").    Weight as of 11/11/21: 88.8 kg (195 lb 12.8 oz).. There is no height or weight on file to calculate BSA. BP completed using cuff size: regular    Nursing Comments: MoCA 2 /30    Lay Reyes      Again, thank you for allowing me to participate in the care of your patient.        Sincerely,        Vasu Georges MD    "

## 2022-05-09 NOTE — PATIENT INSTRUCTIONS
AFTER VISIT SUMMARY (AVS):    At today's visit we thoroughly discussed current symptoms, available treatment options, and the plan.    We decided to try speech therapy for speech difficulty and physical therapy for balance difficulty.    Please complete the recommended labs, as previously ordered.    I will see you after you complete neuropsychology testing that is currently scheduled at the end of August 2022.    Next follow-up appointment is in the next 4 months or earlier if needed.    Please do not hesitate to call me with any questions or concerns.    Thanks.

## 2022-05-09 NOTE — PROGRESS NOTES
"Leonard Garza is a 87 year old male who presents for:  Chief Complaint   Patient presents with     Follow Up     Parkinson's/Memory         Initial Vitals:  /78 (BP Location: Right arm, Patient Position: Sitting, Cuff Size: Adult Regular)   Pulse 59   SpO2 98%  Estimated body mass index is 28.09 kg/m  as calculated from the following:    Height as of 11/11/21: 1.778 m (5' 10\").    Weight as of 11/11/21: 88.8 kg (195 lb 12.8 oz).. There is no height or weight on file to calculate BSA. BP completed using cuff size: regular    Nursing Comments: MoCA 2=  23/30    Lay Reyes  "

## 2022-05-09 NOTE — PROGRESS NOTES
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  He was last seen on 10/20/2021.  At that time, I encouraged the patient to complete recommended laboratory work-up and neuropsychology evaluation.  Please refer to my initial/other prior notes for further information.  He is accompanied by wife, who participates in interview today.    Since the last visit, the patient reports that wife noted that his speech is not clear.  He continues to struggle with his balance.  His wife adds that the patient seems to be depressed and has lack of initiative, but the patient does not agree with this observation.  He denies interval development of new focal neurological symptoms.  Did not complete his labs.  Neuropsychology testing is scheduled on 2022.  EXAM:                                                    Physical Exam:   Vitals: /78 (BP Location: Right arm, Patient Position: Sitting, Cuff Size: Adult Regular)   Pulse 59   SpO2 98%   Bruce Cognitive Assessment:    Bruce Cognitive Assessment (MOCA)  Visuospatial/Executive : 3  Namin  Attention - Digits: 2  Attention - Letters: 1  Attention - Subtraction: 3  Language - Repeat: 1  Language - Fluency : 0  Abstraction: 2  Delayed Recall: 3  Orientation: 6  Education: 0  MOCA Score: 23  Administered by: : Lay GALDAMEZ     [place] Cognitive Assessment Score:  MOCA Score: 23/30.     General: pt is in NAD, cooperative.  Skin: normal turgor, moist mucous membranes, no lesions/rashes noticed.  HEENT: ATNC, white sclera, normal  conjunctiva.  Respiratory: Symmetric lung excursion, no accessory respiratory muscle use.  Abdomen: Non distended.  Neurological: awake, cooperative, follows commands, no exam changes compared to the last visit.  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  Reports speech difficulty and continued trouble with his balance.    His MoCA today is 23/30 (stable), compared to 24/30 at the initial visit.  He is scheduled to have neuropsychology evaluation at the end of August 2022.  We will review it at the next visit.      Previously, Sinemet caused significant nausea.  We discussed that it could be cautiously retried at the lower dose versus amantadine or dopamine agonists.  However, his parkinsonism symptoms seems to be stable, and I do not think that initiation of additional medications would be helpful at this point.      At the same time, he complains of dysarthria/speech difficulty and might benefit from speech therapy.  For his continued balance difficulty, I placed an order for balance PT.    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 Dysarthria  R47.1 Speech Therapy Referral   3. Difficulty balancing  R29.818 Physical Therapy Referral     Plan: At today's visit we thoroughly discussed current symptoms, available treatment options, and the plan.    We decided to try speech therapy for speech difficulty and physical therapy for balance difficulty.    I advised the patient to complete the recommended labs, as previously ordered.    I will see him after he completes neuropsychology testing that is currently scheduled at the end of August 2022.    Next follow-up appointment is in the next 4 months or earlier if needed.    Total Time: 31 minutes spent on the date of the encounter doing chart review, history  and exam, documentation and further activities per the note.    Vasu Georges MD  [facility] Neurology  (Chart documentation was completed in part with Dragon voice-recognition software. Even though reviewed, some grammatical, spelling, and word errors may remain.)

## 2022-05-11 ENCOUNTER — HOSPITAL ENCOUNTER (OUTPATIENT)
Dept: PHYSICAL THERAPY | Facility: CLINIC | Age: 87
Discharge: HOME | End: 2022-05-11
Attending: PSYCHIATRY & NEUROLOGY
Payer: MEDICARE

## 2022-05-11 DIAGNOSIS — R29.818 DIFFICULTY BALANCING: ICD-10-CM

## 2022-05-11 DIAGNOSIS — R26.89 IMPAIRED GAIT AND MOBILITY: Primary | ICD-10-CM

## 2022-05-11 PROCEDURE — 97161 PT EVAL LOW COMPLEX 20 MIN: CPT | Mod: GP

## 2022-05-18 NOTE — PROGRESS NOTES
05/11/22 0914   Signing Clinician's Name / Credentials   Signing clinician's name / credentials Jerri Rocha, PT, DPT   Functional Gait Assessment (ALL Johns, Lance GGianna F., et al. (2004))   1. GAIT LEVEL SURFACE 2  (gait pattern deviations)   2. CHANGE IN GAIT SPEED 3   3. GAIT WITH HORIZONTAL HEAD TURNS 2   4. GAIT WITH VERTICAL HEAD TURNS 2   5. GAIT AND PIVOT TURN 2   6. STEP OVER OBSTACLE 2   7. GAIT WITH NARROW BASE OF SUPPORT 0   8. GAIT WITH EYES CLOSED 0  (ran into wall and lost balance)   9. AMBULATING BACKWARDS 2   10. STEPS 2   Total Functional Gait Assessment Score   TOTAL SCORE: (MAXIMUM SCORE 30) 17

## 2022-05-18 NOTE — PROGRESS NOTES
05/11/22 0800   Quick Adds   Quick Adds Certification   Type of Visit Initial OP PT Evaluation   General Information   Start of Care Date 05/11/22   Referring Physician Dr. Georges   Orders Evaluate and Treat as Indicated   Order Date 05/09/22   Medical Diagnosis difficulty balancing   Onset of illness/injury or Date of Surgery 05/09/22  (date of order)   Surgical/Medical history reviewed Yes   Pertinent history of current problem (include personal factors and/or comorbidities that impact the POC) Patient reports to OP PT and is known to clinic as patient was previously seen for a modified version of the Big program. Patient reports finishing program with last scheduled visit 9/8/2021. Patient was in [place] for winter and reports back to PT this date with increased balance problems. Per most recent neuro note, parkinsonism symptoms have been stable. Patient reports that gait has become worse over previous months. Patient also reports decreased activity and exericse, has  but has not been working with them recently.   Prior level of function comment IND with all functions   Patient role/Employment history Retired  (MD)   Home/Community Accessibility Comments multi-level home, reports being careful with stairs   Assistive Devices Comments no AD with ambulation   Patient/Family Goals Statement trying to improve balance   Fall Risk Screen   Fall screen completed by PT   Have you fallen 2 or more times in the past year? Yes   Have you fallen and had an injury in the past year? No   Is patient a fall risk? Yes;Department fall risk interventions implemented   Abuse Screen (yes response referral indicated)   Feels Unsafe at Home or Work/School no   Feels Threatened by Someone no   Does Anyone Try to Keep You From Having Contact with Others or Doing Things Outside Your Home? no   Physical Signs of Abuse Present no   Cognitive Status Examination   Cognitive Comment alert, responds to multi-step commands    Posture   Posture Comments Significant kyphosis and forward head posture, rigidity in trunk rotation noted with ambulation   Range of Motion (ROM)   ROM Comment Decreased spinal AROM with functional mobility. Otherwise WFL.   Strength   Strength Comments LE and proximal weakness noted with functional mobility   Gait   Gait Comments Ambulates with shuffled gait pattern, decreased foot clearance and step length   Gait Special Tests 25 Foot Timed Walk   Seconds 8.06   Steps 17 Steps   Comments Gait Speed: 0.95 m/s no AD   Gait Special Tests Functional Gait Assessment Score out of 30   Score out of 30 17   Balance   Balance Comments 360 degree turn R 8 steps, L 7 steps   Balance Special Tests Modified CTSIB Conditions   Condition 1, seconds 30 Seconds   Condition 2, seconds 30 Seconds   Condition 4, seconds 0 Seconds  (unable to bring feet together on foam)   Condition 5, seconds 0 Seconds  (unable to bring feet together on foam)   Balance Special Tests Sit to Stand Reps in 30 Seconds   Reps in 30 seconds 5   Height 18   Modality Interventions   Planned Modality Interventions Comments Per therapist discretion   Planned Therapy Interventions   Planned Therapy Interventions ADL retraining;balance training;gait training;neuromuscular re-education;motor coordination training;ROM;strengthening;stretching   Clinical Impression   Criteria for Skilled Therapeutic Interventions Met yes, treatment indicated   PT Diagnosis Impaired balance and gait   Influenced by the following impairments decreased force production, postural impairments with rigidity, gait dysfunction, static and dynamic balance impairments with increased falls risk   Functional limitations due to impairments transfers, home and community ambulation   Clinical Presentation Stable/Uncomplicated   Clinical Presentation Rationale current presentation   Clinical Decision Making (Complexity) Low complexity   Therapy Frequency 2 times/Week  (decreased frequency as  indicated)   Predicted Duration of Therapy Intervention (days/wks) up to 90 days   Risk & Benefits of therapy have been explained Yes   Patient, Family & other staff in agreement with plan of care Yes   Clinical Impression Comments Patient presents with above impairments in presense of parkinsonism symptoms which have recently resulted in increased falls and patient fear of falling. Patient would benefit from skilled therapy to address impairments, decrease risk of falling, and maximize safety with functional mobility. Patient also with episode of nausea and emesis at end and following physical therapy evaluation after lying supine. Patient did not display any central signs and may benefit from evaluation for BPPV.   GOALS   PT Eval Goals 1;2;3;4   Goal 1   Goal Identifier Gait Speed   Goal Description Patient to improve gait speed to >1.05 m/s in order to display improved efficiency and mechanics with community ambulation for completion of errands and regular exercise.   Goal Progress baseline: 0.95 m/s   Target Date 08/09/22   Goal 2   Goal Identifier FGA   Goal Description Patient to improve on FGA to 25/30 to display significant improvement in dynamic balance for safety and decreased fall risk with home and community ambulation.   Goal Progress baseline: 17/30   Target Date 08/09/22   Goal 3   Goal Identifier Functional Strength   Goal Description Patient to improve with 30 second sit to stand to 9 repetitions to display improved LE endurance for greater tolerance and safety with completion of transfers from chairs, bed and in/out of cars.   Goal Progress baseline: 5 rep   Target Date 08/09/22   Goal 4   Goal Identifier HEP   Goal Description Patient to independently complete regular exercise program with correct mechanics in order to safely manage impairments upon discharge from skilled therapy.   Target Date 08/09/22   Total Evaluation Time   PT Eval, Low Complexity Minutes (64847) 45   Therapy Certification    Certification date from 05/11/22   Certification date to 08/09/22   Medical Diagnosis difficulty balancing   Certification I certify the need for these services furnished under this plan of treatment and while under my care.  (Physician co-signature of this document indicates review and certification of the therapy plan).

## 2022-05-18 NOTE — PROGRESS NOTES
[facility]                                                                                   OUTPATIENT PHYSICAL THERAPY FUNCTIONAL EVALUATION  PLAN OF TREATMENT FOR OUTPATIENT REHABILITATION  (COMPLETE FOR INITIAL CLAIMS ONLY)  Patient's Last Name, First Name, M.I.  YOB: 1935  GregLeonard NOLAND     Provider's Name   [facility]   Medical Record No.  [id number]     Start of Care Date:  05/11/22   Onset Date:  05/09/22 (date of order)   Type:     _X__PT   ____OT  ____SLP Medical Diagnosis:  difficulty balancing     PT Diagnosis:  Impaired balance and gait Visits from SOC:  1                              __________________________________________________________________________________  Plan of Treatment/Functional Goals:  ADL retraining, balance training, gait training, neuromuscular re-education, motor coordination training, ROM, strengthening, stretching           GOALS  Gait Speed  Patient to improve gait speed to >1.05 m/s in order to display improved efficiency and mechanics with community ambulation for completion of errands and regular exercise.  08/09/22    FGA  Patient to improve on FGA to 25/30 to display significant improvement in dynamic balance for safety and decreased fall risk with home and community ambulation.  08/09/22    Functional Strength  Patient to improve with 30 second sit to stand to 9 repetitions to display improved LE endurance for greater tolerance and safety with completion of transfers from chairs, bed and in/out of cars.  08/09/22    HEP  Patient to independently complete regular exercise program with correct mechanics in order to safely manage impairments upon discharge from skilled therapy.  08/09/22           Therapy Frequency:  2 times/Week (decreased frequency as indicated)   Predicted Duration of Therapy Intervention:  up to 90  berhane Rocha, PT                                    I CERTIFY THE NEED FOR THESE SERVICES FURNISHED UNDER        THIS PLAN OF TREATMENT AND WHILE UNDER MY CARE     (Physician co-signature of this document indicates review and certification of the therapy plan).                Certification Date From:  05/11/22   Certification Date To:  08/09/22    Referring Provider:  Dr. Georges    Initial Assessment  See Epic Evaluation- Start of Care Date: 05/11/22

## 2022-05-27 ENCOUNTER — HOSPITAL ENCOUNTER (OUTPATIENT)
Dept: PHYSICAL THERAPY | Facility: CLINIC | Age: 87
Discharge: HOME | End: 2022-05-27
Payer: MEDICARE

## 2022-05-27 DIAGNOSIS — R26.89 IMPAIRED GAIT AND MOBILITY: Primary | ICD-10-CM

## 2022-05-27 PROCEDURE — 97112 NEUROMUSCULAR REEDUCATION: CPT | Mod: GP | Performed by: PHYSICAL THERAPIST

## 2022-06-13 ENCOUNTER — HOSPITAL ENCOUNTER (OUTPATIENT)
Dept: SPEECH THERAPY | Facility: CLINIC | Age: 87
Discharge: HOME | End: 2022-06-13
Payer: MEDICARE

## 2022-06-13 DIAGNOSIS — R47.1 DYSARTHRIA: Primary | ICD-10-CM

## 2022-06-13 PROCEDURE — 92522 EVALUATE SPEECH PRODUCTION: CPT | Mod: GN | Performed by: STUDENT IN AN ORGANIZED HEALTH CARE EDUCATION/TRAINING PROGRAM

## 2022-06-13 NOTE — PROGRESS NOTES
[facility]          OUTPATIENT SPEECH LANGUAGE PATHOLOGY COMMUNICATION  EVALUATION  PLAN OF TREATMENT FOR OUTPATIENT REHABILITATION  (COMPLETE FOR INITIAL CLAIMS ONLY)  Patient's Last Name, First Name, M.I.  YOB: 1935  Leonard Garza                        Provider s Name: [facility] Medical Record No.  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 Goal Identifier: Intelligibility       Goal Description: Patient will learn, implement, and demonstrate use of speech intelligibility strategies to increase intelligibility across all speech levels (i.e. words/phrases, sentences, extended reading, conversation) to at least 98% accy with min cues to meet speech intelligibility need for daily communication with family and friends.       Target Date: 09/11/22                        Predicted Duration of Therapy Intervention (days/wks): 1x/week for 6 weeks, with 1-2 monthly follow-ups    Rosi Martinez, SLP       I CERTIFY THE NEED FOR THESE SERVICES FURNISHED UNDER        THIS PLAN OF TREATMENT AND WHILE UNDER MY CARE     (Physician co-signature of this document indicates review and certification of the therapy plan).                  Certification Date From:  06/13/22  Certification Date To:   09/11/22          Referring Physician:  Vasu Georges MD (Neurology)    Initial Assessment        See Epic Evaluation Start Of  Care Date: 06/13/22

## 2022-06-13 NOTE — PROGRESS NOTES
"     Speech-Language Pathology Department   SPEECH AND VOICE EVALUATION  [facility]    06/13/22 3654   General Information   Type of Evaluation Dysarthria;Voice   Type Of Visit Initial   Start Of Care Date 06/13/22   Referring Physician Vasu Georges MD  (Neurology)   Orders Evaluate And Treat   Medical Diagnosis Dysarthria, Parkinsonism   Onset Of Illness/injury Or Date Of Surgery 05/09/22  (order date)   Precautions/Limitations  fall precautions  (Also seeing PT)   Hearing Pt wears bilateral hearing aids   Surgical/Medical history reviewed Yes   Pertinent History Of Current Problem 88yo male with 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  Pt reports that his wife says he is hard to understand at times.  He notes that his speech sounds mumbly and \"lazy\", but not necessarily slurred.  His volume is a little softer at times than it was when he was most recently discharged from speech therapy in 2021.  He notes occasional, nonproblematic hoarseness.  He denies difficulties with swallowing and breathing.  PMH also significant for memory impairment (neuropsychology testing scheduled for 8/29/22), difficulty balancing (currently seeing PT).  Please see chart for additional PMH.   Prior Level Of Function Comment Pt is familiar to this SLP from previous courses of skilled speech therapy.  Pt completed the LSVT LOUD program in 2018 and has been seen for follow-up \"refresher\" courses of therapy using amplitude-based voice techniques in 2019, 2020, and most recently in 2021.   Patient Role/employment History Retired  (MD)   Patient/family Goals To be heard and understood by others in conversation   Speech   Deficits in Speech " Respiration None   Deficits in Phonation None  (Average loudness: 73 dB for paragraph reading, 71 dB for conversation)   Sustained vowel production 19.5  (Average loudness: 80 dB)   S/Z Ratio 0.56  (/s/ is mildly distorted, possibly leading to shorter duration d/t excess air flow)   Deficits in Articulation Hypokinetic dysarthria  (Labial AMRs=WNL.  Lingual AMRs=mildly imprecise, fast, and irregular in rhythm.  Labial/lingual SMRs=WNL.)   Deficits in Resonance Hypernasal  (Mild)   Deficits in Prosody Other (Comment);disordered intonation patterns  (Occasional fast rate of speech, monotone.  Pitch range on glissade: E2-F#4.)   AIDS-words, % intelligible 100   AIDS-sentences, % intelligible 96   Apraxia Battery:  Word Repetition - mulitple syllables (out of 10 possible) 9   Apraxia Battery:  Repeat Sentences (out of 5 possible) 4   Speech Comments Pt talking noticeably louder and clearer during evaluation compared to initial history, likely indicating an effect from previous courses of therapy.  Suspect deficits are in the mild-moderate severity range in naturalistic environments rather than mild as suggested by testing today.   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   Functional limitations due to impairments Difficulty being heard and understood by others in conversation   Rehab potential affected by Motivated with good adherence to therapy recommendations  in past courses of therapy, potentially progressive nature of symptoms   Therapy Frequency 1 time;per week   Predicted Duration of Therapy Intervention (days/wks) 1x/week for 6 weeks, with 1-2 monthly follow-ups   Risks and Benefits of Treatment have been explained. 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  Dysarthria is characterized by intermittently imprecise articulation and fast rate, monotone intonation, mild hypernasality, and reduced volume (although reduced volume was noted directly observed during today's evaluation).  People sometimes have difficulty understanding him because of his speech deficits.  RECOMMEND: A course of skilled speech therapy targeting improved speech intelligibility is recommended, so that is better able to be heard and understood by others in daily conversations.  SLP also recommends that pt continue with daily home practice of the LSVT LOUD maintenance exercises.   Cognitive/Communication Goals   Cognitive/Communication Goals 1   Cognitive/Communication Goal 1   Goal Identifier Intelligibility   Goal Description Patient will learn, implement, and demonstrate use of speech intelligibility strategies to increase intelligibility across all speech levels (i.e. words/phrases, sentences, extended reading, conversation) to at least 98% accy with min cues to meet speech intelligibility need for daily communication with family and friends.   Target Date 09/11/22   Total Session Time   Sound production (artic, phonology, apraxia, dysarthria) Minutes (33315) 30   Total Evaluation Time 30   Therapy Certification   Certification date from 06/13/22   Certification date to 09/11/22   Medical Diagnosis Dysarthria, Parkinsonism     Thank you for the referral of this patient.    Allison Alpers Ackmann, B.A. (music), MGiannaA.,  CCC-SLP  Speech-Language Pathologist  [facility] Certificate of Vocology  [facility]  [phone]

## 2022-06-23 ENCOUNTER — TRANSFERRED RECORDS (OUTPATIENT)
Dept: INTERNAL MEDICINE | Facility: CLINIC | Age: 87
End: 2022-06-23

## 2022-06-30 ENCOUNTER — HOSPITAL ENCOUNTER (OUTPATIENT)
Dept: PHYSICAL THERAPY | Facility: CLINIC | Age: 87
Discharge: HOME | End: 2022-06-30
Payer: MEDICARE

## 2022-06-30 DIAGNOSIS — R26.89 IMPAIRED GAIT AND MOBILITY: Primary | ICD-10-CM

## 2022-06-30 DIAGNOSIS — R29.818 DIFFICULTY BALANCING: ICD-10-CM

## 2022-06-30 PROCEDURE — 97110 THERAPEUTIC EXERCISES: CPT | Mod: GP | Performed by: PHYSICAL THERAPIST

## 2022-06-30 PROCEDURE — 97112 NEUROMUSCULAR REEDUCATION: CPT | Mod: GP | Performed by: PHYSICAL THERAPIST

## 2022-07-07 ENCOUNTER — HOSPITAL ENCOUNTER (OUTPATIENT)
Dept: PHYSICAL THERAPY | Facility: CLINIC | Age: 87
Discharge: HOME | End: 2022-07-07
Payer: MEDICARE

## 2022-07-07 ENCOUNTER — HOSPITAL ENCOUNTER (OUTPATIENT)
Dept: SPEECH THERAPY | Facility: CLINIC | Age: 87
Discharge: HOME | End: 2022-07-07

## 2022-07-07 DIAGNOSIS — R47.1 DYSARTHRIA: Primary | ICD-10-CM

## 2022-07-07 DIAGNOSIS — R26.89 IMPAIRED GAIT AND MOBILITY: Primary | ICD-10-CM

## 2022-07-07 PROCEDURE — 97110 THERAPEUTIC EXERCISES: CPT | Mod: GP | Performed by: PHYSICAL THERAPIST

## 2022-07-07 PROCEDURE — 92507 TX SP LANG VOICE COMM INDIV: CPT | Mod: GN | Performed by: STUDENT IN AN ORGANIZED HEALTH CARE EDUCATION/TRAINING PROGRAM

## 2022-07-07 PROCEDURE — 97112 NEUROMUSCULAR REEDUCATION: CPT | Mod: GP | Performed by: PHYSICAL THERAPIST

## 2022-07-14 ENCOUNTER — HOSPITAL ENCOUNTER (OUTPATIENT)
Dept: PHYSICAL THERAPY | Facility: CLINIC | Age: 87
Discharge: HOME | End: 2022-07-14
Payer: MEDICARE

## 2022-07-14 DIAGNOSIS — R26.89 IMPAIRED GAIT AND MOBILITY: Primary | ICD-10-CM

## 2022-07-14 PROCEDURE — 97110 THERAPEUTIC EXERCISES: CPT | Mod: GP | Performed by: PHYSICAL THERAPIST

## 2022-07-14 PROCEDURE — 97112 NEUROMUSCULAR REEDUCATION: CPT | Mod: GP | Performed by: PHYSICAL THERAPIST

## 2022-07-20 ENCOUNTER — HOSPITAL ENCOUNTER (OUTPATIENT)
Dept: SPEECH THERAPY | Facility: CLINIC | Age: 87
Discharge: HOME | End: 2022-07-20
Payer: MEDICARE

## 2022-07-20 DIAGNOSIS — R47.1 DYSARTHRIA: Primary | ICD-10-CM

## 2022-07-20 PROCEDURE — 92507 TX SP LANG VOICE COMM INDIV: CPT | Mod: GN | Performed by: STUDENT IN AN ORGANIZED HEALTH CARE EDUCATION/TRAINING PROGRAM

## 2022-08-12 DIAGNOSIS — I10 ESSENTIAL HYPERTENSION, BENIGN: ICD-10-CM

## 2022-08-12 DIAGNOSIS — Z13.6 CARDIOVASCULAR SCREENING; LDL GOAL LESS THAN 100: ICD-10-CM

## 2022-08-12 DIAGNOSIS — R60.9 EDEMA, UNSPECIFIED TYPE: ICD-10-CM

## 2022-08-12 RX ORDER — ATORVASTATIN CALCIUM 20 MG/1
TABLET, FILM COATED ORAL
Qty: 90 TABLET | Refills: 0 | Status: SHIPPED | OUTPATIENT
Start: 2022-08-12 | End: 2022-08-22

## 2022-08-12 RX ORDER — IRBESARTAN AND HYDROCHLOROTHIAZIDE 150; 12.5 MG/1; MG/1
TABLET, FILM COATED ORAL
Qty: 90 TABLET | Refills: 1 | Status: SHIPPED | OUTPATIENT
Start: 2022-08-12 | End: 2022-08-22

## 2022-08-12 NOTE — TELEPHONE ENCOUNTER
Routing refill request to provider for review/approval because:  Labs out of range:    Creatinine   Date Value Ref Range Status   11/11/2021 1.46 (H) 0.66 - 1.25 mg/dL Final   08/11/2020 1.30 (H) 0.66 - 1.25 mg/dL Final       Jazz Parmar RN

## 2022-08-22 ENCOUNTER — OFFICE VISIT (OUTPATIENT)
Dept: INTERNAL MEDICINE | Facility: CLINIC | Age: 87
End: 2022-08-22
Payer: MEDICARE

## 2022-08-22 VITALS
WEIGHT: 187 LBS | HEART RATE: 68 BPM | DIASTOLIC BLOOD PRESSURE: 78 MMHG | TEMPERATURE: 97.4 F | HEIGHT: 68 IN | OXYGEN SATURATION: 100 % | BODY MASS INDEX: 28.34 KG/M2 | SYSTOLIC BLOOD PRESSURE: 131 MMHG

## 2022-08-22 DIAGNOSIS — Z12.11 COLON CANCER SCREENING: ICD-10-CM

## 2022-08-22 DIAGNOSIS — Z13.6 CARDIOVASCULAR SCREENING; LDL GOAL LESS THAN 100: ICD-10-CM

## 2022-08-22 DIAGNOSIS — G20.A1 PARKINSON'S DISEASE (H): Primary | ICD-10-CM

## 2022-08-22 DIAGNOSIS — I73.9 PERIPHERAL VASCULAR DISEASE (H): ICD-10-CM

## 2022-08-22 DIAGNOSIS — R60.9 EDEMA, UNSPECIFIED TYPE: ICD-10-CM

## 2022-08-22 DIAGNOSIS — I10 ESSENTIAL HYPERTENSION, BENIGN: ICD-10-CM

## 2022-08-22 LAB
ANION GAP SERPL CALCULATED.3IONS-SCNC: 5 MMOL/L (ref 3–14)
BUN SERPL-MCNC: 23 MG/DL (ref 7–30)
CALCIUM SERPL-MCNC: 9 MG/DL (ref 8.5–10.1)
CHLORIDE BLD-SCNC: 108 MMOL/L (ref 94–109)
CO2 SERPL-SCNC: 26 MMOL/L (ref 20–32)
CREAT SERPL-MCNC: 1.31 MG/DL (ref 0.66–1.25)
GFR SERPL CREATININE-BSD FRML MDRD: 53 ML/MIN/1.73M2
GLUCOSE BLD-MCNC: 92 MG/DL (ref 70–99)
POTASSIUM BLD-SCNC: 4.2 MMOL/L (ref 3.4–5.3)
SODIUM SERPL-SCNC: 139 MMOL/L (ref 133–144)

## 2022-08-22 PROCEDURE — G0439 PPPS, SUBSEQ VISIT: HCPCS | Performed by: INTERNAL MEDICINE

## 2022-08-22 PROCEDURE — 36415 COLL VENOUS BLD VENIPUNCTURE: CPT | Performed by: INTERNAL MEDICINE

## 2022-08-22 PROCEDURE — 99214 OFFICE O/P EST MOD 30 MIN: CPT | Mod: 25 | Performed by: INTERNAL MEDICINE

## 2022-08-22 PROCEDURE — 80048 BASIC METABOLIC PNL TOTAL CA: CPT | Performed by: INTERNAL MEDICINE

## 2022-08-22 RX ORDER — IRBESARTAN AND HYDROCHLOROTHIAZIDE 150; 12.5 MG/1; MG/1
1 TABLET, FILM COATED ORAL DAILY
Qty: 90 TABLET | Refills: 1 | Status: SHIPPED | OUTPATIENT
Start: 2022-08-22 | End: 2023-03-02

## 2022-08-22 RX ORDER — ATORVASTATIN CALCIUM 20 MG/1
20 TABLET, FILM COATED ORAL DAILY
Qty: 90 TABLET | Refills: 3 | Status: SHIPPED | OUTPATIENT
Start: 2022-08-22 | End: 2023-03-02

## 2022-08-22 ASSESSMENT — ENCOUNTER SYMPTOMS
FREQUENCY: 0
NERVOUS/ANXIOUS: 0
SORE THROAT: 0
COUGH: 0
JOINT SWELLING: 0
CHILLS: 0
EYE PAIN: 0
NAUSEA: 0
WEAKNESS: 0
SHORTNESS OF BREATH: 0
FEVER: 0
HEMATOCHEZIA: 0
ARTHRALGIAS: 0
HEARTBURN: 1
DYSURIA: 0
ABDOMINAL PAIN: 0
DIZZINESS: 0
MYALGIAS: 0
HEMATURIA: 0
HEADACHES: 0
DIARRHEA: 0
CONSTIPATION: 1
PALPITATIONS: 0
PARESTHESIAS: 0

## 2022-08-22 ASSESSMENT — ACTIVITIES OF DAILY LIVING (ADL): CURRENT_FUNCTION: NO ASSISTANCE NEEDED

## 2022-08-22 NOTE — PROGRESS NOTES
"SUBJECTIVE:   Leonard Garza is a 87 year old male who presents for Preventive Visit. For assisted living care.    Patient has been advised of split billing requirements and indicates understanding: Yes  Are you in the first 12 months of your Medicare coverage?  No    Healthy Habits:     In general, how would you rate your overall health?  Very good    Frequency of exercise:  4-5 days/week    Duration of exercise:  30-45 minutes    Do you usually eat at least 4 servings of fruit and vegetables a day, include whole grains    & fiber and avoid regularly eating high fat or \"junk\" foods?  Yes    Taking medications regularly:  Yes    Barriers to taking medications:  None    Medication side effects:  None    Ability to successfully perform activities of daily living:  No assistance needed    Home Safety:  No safety concerns identified    Hearing Impairment:  Difficulty following a conversation in a noisy restaurant or crowded room, difficulty following dialogue in the theater, difficult to understand a speaker at a public meeting or Jain service and difficulty understanding soft or whispered speech    In the past 6 months, have you been bothered by leaking of urine?  No    In general, how would you rate your overall mental or emotional health?  Very good      PHQ-2 Total Score: 0    Additional concerns today:  Yes    Do you feel safe in your environment? Yes    Have you ever done Advance Care Planning? (For example, a Health Directive, POLST, or a discussion with a medical provider or your loved ones about your wishes): Yes, advance care planning is on file.       Fall risk: mild  Cognitive Screening:  intact    Mini-CogTM Copyright NUZHAT Harris. Licensed by the author for use in [facility]; reprinted with permission (manish@.[facility]). All rights reserved.      Do you have sleep apnea, excessive snoring or daytime drowsiness?: no    Reviewed and updated as needed this visit by clinical staff   Tobacco  " Allergies  Meds                Reviewed and updated as needed this visit by Provider     Meds               Social History     Tobacco Use     Smoking status: Former Smoker     Smokeless tobacco: Never Used     Tobacco comment: quit 40 yrs ago   Substance Use Topics     Alcohol use: No     Comment: social       Alcohol Use 8/22/2022   Prescreen: >3 drinks/day or >7 drinks/week? No   Prescreen: >3 drinks/day or >7 drinks/week? -     Current Outpatient Medications   Medication     atorvastatin (LIPITOR) 20 MG tablet     irbesartan-hydrochlorothiazide (AVALIDE) 150-12.5 MG tablet     aspirin (ASA) 81 MG chewable tablet     No current facility-administered medications for this visit.       Current providers sharing in care for this patient include:   Patient Care Team:  Gian Fuller MD as PCP - General  Gian Fuller MD as Assigned PCP  Vasu Georges MD as Assigned Neuroscience Provider    The following health maintenance items are reviewed in Epic and correct as of today:  Health Maintenance Due   Topic Date Due     ANNUAL REVIEW OF  ORDERS  Never done     COLORECTAL CANCER SCREENING  12/19/2013     COVID-19 Vaccine (2 - Pfizer series) 09/29/2021       Immunization History   Administered Date(s) Administered     COVID-19,PF,Pfizer (12+ Yrs) 09/08/2021     FLUAD(HD)65+ QUAD 11/01/2021     HEPA 10/02/2009     HepB 10/02/2009     Influenza (High Dose) 3 valent vaccine 11/05/2015, 11/28/2016, 12/18/2017, 10/05/2018, 12/15/2020     Influenza (IIV3) PF 10/01/2013     Pneumo Conj 13-V (2010&after) 11/05/2015     Pneumococcal 23 valent 05/12/1999, 12/29/2004     TD (ADULT, 7+) 10/02/2009, 12/15/2020     Tetanus 05/01/2004     Typhoid Oral 10/02/2009     Zoster vaccine recombinant adjuvanted (SHINGRIX) 10/04/2020, 11/01/2020       Review of Systems  CONSTITUTIONAL: NEGATIVE for fever, chills, mild change in weight  EYES: NEGATIVE for vision changes or irritation  ENT/MOUTH: NEGATIVE for ear,  "mouth and throat problems  RESP: NEGATIVE for significant cough or SOB  CV: NEGATIVE for chest pain, palpitations or peripheral edema  GI: NEGATIVE for nausea, abdominal pain, heartburn, or change in bowel habits  : NEGATIVE for frequency, dysuria, or hematuria  MUSCULOSKELETAL: NEGATIVE for significant arthralgias or myalgia  HEME: NEGATIVE for bleeding problems  PSYCHIATRIC: NEGATIVE for changes in mood or affect    OBJECTIVE:   /78   Pulse 68   Temp 97.4  F (36.3  C)   Ht 1.727 m (5' 8\")   Wt 84.8 kg (187 lb)   SpO2 100%   BMI 28.43 kg/m   Estimated body mass index is 28.43 kg/m  as calculated from the following:    Height as of this encounter: 1.727 m (5' 8\").    Weight as of this encounter: 84.8 kg (187 lb).     Physical Exam  GENERAL: alert and no distress  EYES: Eyes grossly normal to inspection, PERRL and conjunctivae and sclerae normal  HENT: ear canals and TM's normal, nose and mouth without ulcers or lesions  NECK: no adenopathy, no asymmetry, masses, or scars and thyroid normal to palpation  RESP: lungs clear to auscultation - no rales, rhonchi or wheezes  CV: regular rate and rhythm, normal S1 S2, no S3 or S4, no click or rub  MS: no gross musculoskeletal defects noted  NEURO: Gait is mildly slowed but not ataxic.  Slight small steps are noted.  Mild masked facies is noted.  Speech is slow and deliberate.  PSYCH: mentation appears normal, affect flat    ASSESSMENT / PLAN:   (G20) Parkinson's disease (H)  (primary encounter diagnosis)  Comment: ?parkinsonian syndrome, ?vascular component  Plan: Following up with neurology as directed.    (I10) Essential hypertension, benign  Comment: Stable and continue with current medical management.  Plan: irbesartan-hydrochlorothiazide (AVALIDE)         150-12.5 MG tablet, Basic metabolic panel  (Ca,        Cl, CO2, Creat, Gluc, K, Na, BUN)        Recheck renal function    (Z13.6) CARDIOVASCULAR SCREENING; LDL GOAL LESS THAN 100  Comment: Continue with " "statin therapy as ordered  Plan: atorvastatin (LIPITOR) 20 MG tablet          LDL Cholesterol Calculated   Date Value Ref Range Status   11/11/2021 68 <=100 mg/dL Final   08/11/2020 64 <100 mg/dL Final     Comment:     Desirable:       <100 mg/dl       (I73.9) Peripheral vascular disease (H)  Comment: Table without active complaints.  Continue with risk reduction therapy  Plan:     (Z12.11) Colon cancer screening  Comment: Discussed colonoscopy versus FIT testing.  Plan:     (R60.9) Edema, unspecified type  Comment: Stable and continue with diuretic therapy as previously ordered.  Plan: irbesartan-hydrochlorothiazide (AVALIDE)         150-12.5 MG tablet              Patient has been advised of split billing requirements and indicates understanding: Yes    COUNSELING:  Reviewed preventive health counseling, as reflected in patient instructions       Regular exercise       Healthy diet/nutrition    Estimated body mass index is 28.43 kg/m  as calculated from the following:    Height as of this encounter: 1.727 m (5' 8\").    Weight as of this encounter: 84.8 kg (187 lb).      He reports that he has quit smoking. He has never used smokeless tobacco.      Appropriate preventive services were discussed with this patient, including applicable screening as appropriate for cardiovascular disease, diabetes, osteopenia/osteoporosis, and glaucoma.  As appropriate for age/gender, discussed screening for colorectal cancer, prostate cancer, breast cancer, and cervical cancer. Checklist reviewing preventive services available has been given to the patient.    Reviewed patients plan of care and provided an AVS. The Basic Care Plan (routine screening as documented in Health Maintenance) for Leonard meets the Care Plan requirement. This Care Plan has been established and reviewed with the Patient.    Counseling Resources:  ATP IV Guidelines  Pooled Cohorts Equation Calculator  Breast Cancer Risk Calculator  Breast Cancer: Medication to " Reduce Risk  FRAX Risk Assessment  ICSI Preventive Guidelines  Dietary Guidelines for Americans, 2010  Televerde's MyPlate  ASA Prophylaxis  Lung CA Screening    Gian Fuller MD  [facility]    Identified Health Risks:

## 2022-08-22 NOTE — LETTER
[facility]  [address] [place] [address]  [phone]      8/22/2022       Leonard Garza  [address] [place] [id number]        Dear Héctor,    Your basic panel is essentially returned normal and your kidney function tests have improved.  Your kidney values are still just slightly abnormal but certainly something that can be observed at this point with a consideration of a recheck in 3-6 months.    Sincerely,      Gian Fuller MD  Internal Medicine

## 2022-08-29 ENCOUNTER — OFFICE VISIT (OUTPATIENT)
Dept: NEUROPSYCHOLOGY | Facility: CLINIC | Age: 87
End: 2022-08-29
Attending: PSYCHIATRY & NEUROLOGY
Payer: MEDICARE

## 2022-08-29 DIAGNOSIS — F09 MENTAL DISORDER DUE TO GENERAL MEDICAL CONDITION: Primary | ICD-10-CM

## 2022-08-29 DIAGNOSIS — G21.4 VASCULAR PARKINSONISM (H): ICD-10-CM

## 2022-08-29 PROCEDURE — 96133 NRPSYC TST EVAL PHYS/QHP EA: CPT

## 2022-08-29 PROCEDURE — 96132 NRPSYC TST EVAL PHYS/QHP 1ST: CPT

## 2022-08-29 PROCEDURE — 90791 PSYCH DIAGNOSTIC EVALUATION: CPT

## 2022-08-29 PROCEDURE — 96139 PSYCL/NRPSYC TST TECH EA: CPT

## 2022-08-29 PROCEDURE — 96138 PSYCL/NRPSYC TECH 1ST: CPT
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
## 2022-08-30 NOTE — NURSING NOTE
The patient was seen for neuropsychological evaluation at the request of Vasu Georges MD for the purposes of diagnostic clarification and treatment planning.  256 minutes of test administration and scoring were provided by this writer.  Please see Dr. Jannette Presley's report for a full interpretation of the findings.    Lala Martinez  Psychometrist

## 2022-09-05 NOTE — CONFIDENTIAL NOTE
Patient Leonard Garza  [address] [birth year]    MRN [id number]  Provider      39   Tech AJ    Sex Male   Occupation     Education 20   Language     Preferred Hand Right   Station OP    Age 87                 DEMENTIA RATING SCALE - 2   TEST FORM Standard     Raw MAS       Attention 33 8       Init/Psv 36 11       Construct 6 10       Concept 29 6       Memory 19 5       Total / 144 123 6                WAIS-IV          Raw SS       Comprehension 24 12       Letter Num Seq 15 7       Digit Span 19 9       Matrix Reasoning 12 13                WIDE RANGE ACHIEVEMENT TEST    TEST FORM 5     Raw SS %ile Grade Eq.     Reading 66 114 82 >12.9              WECHSLER MEMORY SCALE - IV         Raw SS/%ile       Info/Orientation 13        Logical Memory I 35 14       Logical Memory II 10 9       LM Recognition  21 >75                BOSTON NAMING TEST         Score (Correct+Stim Cue)  51 MAS 7     # Correct Stimulus Cue  0       # Correct Phonemic Cue  5                D-KEFS VERBAL FLUENCY    TEST FORM Standard     Raw SS       Letter Fluency 19 6       Category Fluency 30 10       Switching Fluency             Total Correct 12 12       Switching Accuracy 11 12                CLOCK DRAWING         Command 2 /3 Rating       Copy 2 /3 Rating      TRAIL MAKING TEST          Seconds Errors MAS      Trails A 44 0 9      Trails B 166 3 6               STROOP          Raw MAS       Word 66 4       Color  46 7       Color/Word 14 4                BUNCH JUDGMENT OF LINE ORIENTATION  TEST FORM H    Raw Score 14        MAS 5                 HVLT-R    TEST FORM 1     Raw T       Trial 1 6        Trial 2 6        Trial 3 9        Total 1-3 21 47       Learning 3        Delay 8 51       Percent Retained 89% 54       True Positives 11        Discrimination Index 11 54       False Positives 0                 BRIEF VISUAL MEMORY TEST - REVISED  TEST FORM 1     Raw T       Trial 1 1        Trial 2 2        Trial 3 4        Total 1-3 7 27        Learning 3 46       Delay 1 23       Percent Retained 25% <1 %ile      Recognition Hits 5        Discrimination Index 4 11-16 %ile      False Alarms 1                 EDWARDO-O COMPLEX FIGURE TEST          Raw %ile       Copy  28 >16       Time to Copy 392 >16                HINA COGNITVE ASSESSMENT (MOCA)  TEST FORM 0    Score 0 /30                GDS         Score 0        Interpretation Minimal                 APATHY SCALE         Score 10

## 2022-09-07 ENCOUNTER — TELEPHONE (OUTPATIENT)
Dept: NEUROLOGY | Facility: CLINIC | Age: 87
End: 2022-09-07

## 2022-09-07 NOTE — LETTER
September 8, 2022      Leonard Garza  [address] [facility] [address]        Dear Leonard,     Here are the office visit notes from your neuropsychology testing as you requested.  These will be discussed further at you upcoming office visit with Dr. Georges 9/21/22 at 11:15 AM.      Please let us know if you have any questions before your appointment.      Sincerely,        Sara DAMON RN, BSN  [facility]

## 2022-09-07 NOTE — TELEPHONE ENCOUNTER
Patient requests a call back regarding recent testing he had. He is wanting to get the results. Please call him back at [phone]. Thank you~

## 2022-09-08 NOTE — TELEPHONE ENCOUNTER
Called patient back, let him know that Dr. Georges will go over his results at 9/21/22 appointment.     Pt adamant that he needs at least a summary of the results within the next couple days, so he can give the results to a doctor in [place].  He also is buying a place in [place] and they require the results prior to letting him buy the place.     Patient requested that OV notes from neuropsych be emailed to him.  Emailed and mailed patient a copy as requested.  Advised patient that these will be discussed further at upcoming appointment.     Sara DAMON RN, BSN  [facility]

## 2022-09-21 ENCOUNTER — OFFICE VISIT (OUTPATIENT)
Dept: NEUROLOGY | Facility: CLINIC | Age: 87
End: 2022-09-21
Payer: MEDICARE

## 2022-09-21 VITALS — DIASTOLIC BLOOD PRESSURE: 70 MMHG | SYSTOLIC BLOOD PRESSURE: 103 MMHG | OXYGEN SATURATION: 99 % | HEART RATE: 59 BPM

## 2022-09-21 DIAGNOSIS — G21.4 VASCULAR PARKINSONISM (H): Primary | ICD-10-CM

## 2022-09-21 DIAGNOSIS — G31.84 MILD COGNITIVE IMPAIRMENT: ICD-10-CM

## 2022-09-21 PROCEDURE — 99214 OFFICE O/P EST MOD 30 MIN: CPT | Performed by: PSYCHIATRY & NEUROLOGY

## 2022-09-21 NOTE — LETTER
9/21/2022         RE: Leonard Garza  [address] [facility] [address]        Dear Colleague,    Thank you for referring your patient, Leonard Garza, to the [facility]. Please see a copy of my visit note below.    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  Last visit was on 5/9/2022.  Please refer to my initial/other prior notes for further information.  Accompanied by his wife today.    Since the last visit, the patient reports that his symptoms are stable.  Wife agrees.  He feels that speech and physical therapy are helpful, but he does not do them regularly.  He denies interval development of new focal neurological symptoms.  He did not complete recommended labs.    Neuropsychologic testing on 8/29/2022 demonstrated impairment in learning and retrieval of visual information while verbal learning and memory were within normal limits.  Visual-spatial abilities were impaired, but visual construction and nonverbal reasoning were within normal limits.  In addition, mild executive dysfunction was noted.  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  Repeat evaluation in 12 months was advised.    The patient plans to spend winter in [place] leaving in January and returning in 5 to 6 months.  EXAM:                                                    Physical Exam:   Vitals: /70 (BP Location: Right arm,  Patient Position: Sitting, Cuff Size: Adult Regular)   Pulse 59   SpO2 99%     General: pt is in NAD, cooperative.  Skin: normal turgor, moist mucous membranes, no lesions/rashes noticed.  HEENT: ATNC, white sclera, normal conjunctiva.  Respiratory: Symmetric lung excursion, no accessory respiratory muscle use.  Abdomen: Non distended.  Neurological: awake, cooperative, follows commands, mild hypomimia, hearing is reduced, mild hypophonia, tone increases with provoking maneuvers, left more than right, no tremor, mildly stooped forward posture and slightly decreased stride length and reduced arm swings.  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  He completed neuropsychologic testing that demonstrated findings consistent with multidomain mild cognitive impairment.  The trial of Sinemet in the past caused intolerable nausea.  We also discussed option of amantadine or dopamine agonists in the past.  We had a detailed discussion regarding evaluation results and the plan, as summarized below.    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 Mild cognitive impairment  G31.84      Plan: At today's visit we thoroughly discussed current symptoms, evaluation results, diagnosis, and the plan.    We we will repeat cognitive test at the next visit.  We discussed the importance of regular cognitive exercises, as well as exercises shown in speech and physical therapy.    No new medications.    Next follow-up appointment is in the next 6-9 months or earlier if needed.    Total Time: 31 minutes spent on the date of the encounter doing chart review, history and exam, documentation and further activities per the note.    Vasu Georges MD  [facility] Neurology  (Chart documentation was completed  "in part with Dragon voice-recognition software. Even though reviewed, some grammatical, spelling, and word errors may remain.)      Leonard Garza is a 87 year old male who presents for:  Chief Complaint   Patient presents with     Follow Up     Parkinson- Review Neuropsych         Initial Vitals:  /70 (BP Location: Right arm, Patient Position: Sitting, Cuff Size: Adult Regular)   Pulse 59   SpO2 99%  Estimated body mass index is 28.43 kg/m  as calculated from the following:    Height as of 8/22/22: 1.727 m (5' 8\").    Weight as of 8/22/22: 84.8 kg (187 lb).. There is no height or weight on file to calculate BSA. BP completed using cuff size: regular        Lay Reyes      Again, thank you for allowing me to participate in the care of your patient.        Sincerely,        Vasu Georges MD    "

## 2022-09-21 NOTE — PROGRESS NOTES
"Leonard Garza is a 87 year old male who presents for:  Chief Complaint   Patient presents with     Follow Up     Parkinson- Review Neuropsych         Initial Vitals:  /70 (BP Location: Right arm, Patient Position: Sitting, Cuff Size: Adult Regular)   Pulse 59   SpO2 99%  Estimated body mass index is 28.43 kg/m  as calculated from the following:    Height as of 8/22/22: 1.727 m (5' 8\").    Weight as of 8/22/22: 84.8 kg (187 lb).. There is no height or weight on file to calculate BSA. BP completed using cuff size: regular        Lay Reyes  "

## 2022-09-21 NOTE — PROGRESS NOTES
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  Last visit was on 5/9/2022.  Please refer to my initial/other prior notes for further information.  Accompanied by his wife today.    Since the last visit, the patient reports that his symptoms are stable.  Wife agrees.  He feels that speech and physical therapy are helpful, but he does not do them regularly.  He denies interval development of new focal neurological symptoms.  He did not complete recommended labs.    Neuropsychologic testing on 8/29/2022 demonstrated impairment in learning and retrieval of visual information while verbal learning and memory were within normal limits.  Visual-spatial abilities were impaired, but visual construction and nonverbal reasoning were within normal limits.  In addition, mild executive dysfunction was noted.  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  Repeat evaluation in 12 months was advised.    The patient plans to spend winter in [place] leaving in January and returning in 5 to 6 months.  EXAM:                                                    Physical Exam:   Vitals: /70 (BP Location: Right arm, Patient Position: Sitting, Cuff Size: Adult Regular)   Pulse 59   SpO2 99%     General: pt is in NAD, cooperative.  Skin: normal turgor, moist mucous membranes, no lesions/rashes noticed.  HEENT: ATNC, white sclera, normal conjunctiva.  Respiratory: Symmetric  lung excursion, no accessory respiratory muscle use.  Abdomen: Non distended.  Neurological: awake, cooperative, follows commands, mild hypomimia, hearing is reduced, mild hypophonia, tone increases with provoking maneuvers, left more than right, no tremor, mildly stooped forward posture and slightly decreased stride length and reduced arm swings.  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  He completed neuropsychologic testing that demonstrated findings consistent with multidomain mild cognitive impairment.  The trial of Sinemet in the past caused intolerable nausea.  We also discussed option of amantadine or dopamine agonists in the past.  We had a detailed discussion regarding evaluation results and the plan, as summarized below.    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 Mild cognitive impairment  G31.84      Plan: At today's visit we thoroughly discussed current symptoms, evaluation results, diagnosis, and the plan.    We we will repeat cognitive test at the next visit.  We discussed the importance of regular cognitive exercises, as well as exercises shown in speech and physical therapy.    No new medications.    Next follow-up appointment is in the next 6-9 months or earlier if needed.    Total Time: 31 minutes spent on the date of the encounter doing chart review, history and exam, documentation and further activities per the note.    Vasu Georges MD  [facility]  (Chart documentation was completed in part with Dragon voice-recognition software. Even though reviewed, some grammatical, spelling, and word errors may remain.)

## 2022-09-21 NOTE — PATIENT INSTRUCTIONS
AFTER VISIT SUMMARY (AVS):    At today's visit we thoroughly discussed current symptoms, evaluation results, diagnosis, and the plan.    We we will repeat cognitive test at the next visit.  We discussed the importance of regular cognitive exercises, as well as exercises shown in speech and physical therapy.    No new medications.    Next follow-up appointment is in the next 6-9 months or earlier if needed.    Please do not hesitate to call me with any questions or concerns.    Thanks.

## 2022-10-12 ENCOUNTER — TRANSFERRED RECORDS (OUTPATIENT)
Dept: HEALTH INFORMATION MANAGEMENT | Facility: CLINIC | Age: 87
End: 2022-10-12

## 2022-11-29 ENCOUNTER — TRANSFERRED RECORDS (OUTPATIENT)
Dept: HEALTH INFORMATION MANAGEMENT | Facility: CLINIC | Age: 87
End: 2022-11-29

## 2023-02-23 ENCOUNTER — NURSE TRIAGE (OUTPATIENT)
Dept: INTERNAL MEDICINE | Facility: CLINIC | Age: 88
End: 2023-02-23
Payer: MEDICARE

## 2023-02-23 NOTE — TELEPHONE ENCOUNTER
Patient Contact    Attempt # 1    Was call answered?  No.  Left message on voicemail with information to call me back.    Upon call back, please review provider message below.

## 2023-02-23 NOTE — TELEPHONE ENCOUNTER
Raquel called back     Relayed below message from PCP     Felipa REGALADO, Triage RN  [facility]

## 2023-02-23 NOTE — TELEPHONE ENCOUNTER
I believe this patient is out of state thus would suggest that patient be seen by a provider in the [place] area to do a full assessment as to candidacy for Paxlovid therapy.

## 2023-02-23 NOTE — TELEPHONE ENCOUNTER
[phone] LV to call back clinic     [phone] spoke to pt's wife and left a message to have patient call clinic.     Please triage and discuss COVID treatment options/KYLAH Ragsdale.

## 2023-02-23 NOTE — TELEPHONE ENCOUNTER
COVID Positive/Requesting COVID treatment    Patient is positive for COVID and requesting treatment options.    Date of positive COVID test (PCR or at home)? 2/22/2023  Current COVID symptoms: cough, fatigue, muscle or body aches, new loss of taste or smell, sore throat and congestion or runny nose  Date COVID symptoms began: 2/2/20/2023    Message should be routed to clinic RN pool. Best phone number to use for call back: [phone]

## 2023-02-28 ENCOUNTER — TELEPHONE (OUTPATIENT)
Dept: NEUROLOGY | Facility: CLINIC | Age: 88
End: 2023-02-28
Payer: MEDICARE

## 2023-03-01 ENCOUNTER — TELEPHONE (OUTPATIENT)
Dept: NEUROLOGY | Facility: CLINIC | Age: 88
End: 2023-03-01
Payer: MEDICARE

## 2023-03-01 NOTE — TELEPHONE ENCOUNTER
Patient was rescheduled fom May 17th due to provider scheduling needs.  Patient spouse concerned that waiting until July may be too far out.  Patient will be returning from [place] late spring.

## 2023-03-02 ENCOUNTER — TELEPHONE (OUTPATIENT)
Dept: INTERNAL MEDICINE | Facility: CLINIC | Age: 88
End: 2023-03-02
Payer: MEDICARE

## 2023-03-02 DIAGNOSIS — I10 ESSENTIAL HYPERTENSION, BENIGN: ICD-10-CM

## 2023-03-02 DIAGNOSIS — R60.9 EDEMA, UNSPECIFIED TYPE: ICD-10-CM

## 2023-03-02 DIAGNOSIS — Z13.6 CARDIOVASCULAR SCREENING; LDL GOAL LESS THAN 100: ICD-10-CM

## 2023-03-02 RX ORDER — ATORVASTATIN CALCIUM 20 MG/1
20 TABLET, FILM COATED ORAL DAILY
Qty: 90 TABLET | Refills: 3 | Status: SHIPPED | OUTPATIENT
Start: 2023-03-02 | End: 2024-03-28

## 2023-03-02 RX ORDER — IRBESARTAN AND HYDROCHLOROTHIAZIDE 150; 12.5 MG/1; MG/1
1 TABLET, FILM COATED ORAL DAILY
Qty: 90 TABLET | Refills: 3 | Status: SHIPPED | OUTPATIENT
Start: 2023-03-02 | End: 2024-04-01

## 2023-04-26 ENCOUNTER — TELEPHONE (OUTPATIENT)
Dept: NEUROLOGY | Facility: CLINIC | Age: 88
End: 2023-04-26

## 2023-04-26 NOTE — TELEPHONE ENCOUNTER
Patients wife called and stated that patients symptoms have gotten worse within the last few weeks. She would like Patient to see Dr. José Manuel MARMOLEJO. Patient is willing to come back to [place] to see José Manuel due to current symptoms. Per wife he has Developed tremors, and has had more recurrent falls. They can make it back the  2nd week in May. Doctor in [place] canceled appt on 05/03/23 and that was pushed out to august. [phone] Raquel, patient s wife

## 2023-06-14 NOTE — ADDENDUM NOTE
Encounter addended by: Jerri Rocha, PT on: 6/14/2023 8:40 AM   Actions taken: Clinical Note Signed, Episode resolved

## 2023-06-14 NOTE — PROGRESS NOTES
PT: Patient seen for 5 OP PT visits and then cancelled additional scheduled visits. Patient with noted ongoing deficits in balance and gait. Goals not met at this time. Patient will likely benefit from additional PT in future to address deficits.

## 2023-07-17 NOTE — PROGRESS NOTES
ESTABLISHED PATIENT NEUROLOGY NOTE    DATE OF VISIT: 7/18/2023  CLINIC LOCATION: [facility]  MRN: [id number]  PATIENT NAME: Leonard Garza  YOB: 1935    REASON FOR VISIT: No chief complaint on file.    SUBJECTIVE:                                                      HISTORY OF PRESENT ILLNESS: Patient is here to follow up regarding mild cognitive impairment and vascular parkinsonism.  The last visit was on 9/21/2022.  Please refer to my initial/other prior notes for further information.  He is accompanied by his wife today.    Since the last visit, the patient reports ***.  He denies interval development of new neurological symptoms.  EXAM:                                                    Physical Exam:   Vitals: There were no vitals taken for this visit.  Bruce Cognitive Assessment:          Bruce Cognitive Assessment Score:   /30.     General: pt is in NAD, cooperative.  Skin: normal turgor, moist mucous membranes, no lesions/rashes noticed.  HEENT: ATNC, white sclera, normal conjunctiva.  Respiratory: Symmetric lung excursion, no accessory respiratory muscle use.  Abdomen: Non distended.  Neurological: awake, cooperative, follows commands, no exam changes compared to the previous visits.  ASSESSMENT AND PLAN:                                                    Assessment: 88 year old male patient presents for follow-up of vascular parkinsonism and mild cognitive impairment.  MoCA today is ***/30, compared to 24/30 in July 2021 and 23/30 in May 2022.  He tried Sinemet, but it caused intolerable nausea.  Amantadine and dopamine agonist could be considered.    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 Mild cognitive impairment  G31.84         Plan:  There are no Patient Instructions on file for this visit.    Total Time: *** minutes spent on the date of the encounter doing chart review, history and exam, documentation and further activities  per the note.    Vasu Georges MD  [facility]rology  (Chart documentation was completed in part with Dragon voice-recognition software. Even though reviewed, some grammatical, spelling, and word errors may remain.)

## 2023-07-18 ENCOUNTER — OFFICE VISIT (OUTPATIENT)
Dept: NEUROLOGY | Facility: CLINIC | Age: 88
End: 2023-07-18
Payer: MEDICARE

## 2023-07-18 VITALS — DIASTOLIC BLOOD PRESSURE: 66 MMHG | HEART RATE: 54 BPM | SYSTOLIC BLOOD PRESSURE: 102 MMHG | OXYGEN SATURATION: 95 %

## 2023-07-18 DIAGNOSIS — R29.818 DIFFICULTY BALANCING: ICD-10-CM

## 2023-07-18 DIAGNOSIS — R49.8 HYPOPHONIA: ICD-10-CM

## 2023-07-18 DIAGNOSIS — G31.84 MILD COGNITIVE IMPAIRMENT: ICD-10-CM

## 2023-07-18 DIAGNOSIS — G21.4 VASCULAR PARKINSONISM (H): Primary | ICD-10-CM

## 2023-07-18 PROCEDURE — 99214 OFFICE O/P EST MOD 30 MIN: CPT | Performed by: PSYCHIATRY & NEUROLOGY

## 2023-07-18 ASSESSMENT — MONTREAL COGNITIVE ASSESSMENT (MOCA)
9. REPEAT EACH SENTENCE: 1
7. [VIGILENCE] TAP WHEN HEARING DESIGNATED LETTER: 1
6. READ LIST OF DIGITS [FORWARD/BACKWARD]: 2
VISUOSPATIAL/EXECUTIVE SUBSCORE: 4
WHAT IS THE TOTAL SCORE (OUT OF 30): 23
8. SERIAL SUBTRACTION OF 7S: 3
13. ORIENTATION SUBSCORE: 5
4. NAME EACH OF THE THREE ANIMALS SHOWN: 3
11. FOR EACH PAIR OF WORDS, WHAT CATEGORY DO THEY BELONG TO (OUT OF 2): 2
10. [FLUENCY] NAME WORDS STARTING WITH DESIGNATED LETTER: 0
WHAT LEVEL OF EDUCATION WAS ATTAINED: 0
12. MEMORY INDEX SCORE: 2

## 2023-07-18 NOTE — PATIENT INSTRUCTIONS
AFTER VISIT SUMMARY (AVS):    At today's visit we thoroughly discussed current symptoms, cognitive test results (stable), available treatment options, and the plan.  We will repeat cognitive testing in the next visit.    We decided to repeat Big and Loud Program.    No new medications.    Next follow-up appointment is in the next 9-12 months or earlier if needed.    Please do not hesitate to call me with any questions or concerns.    Thanks.

## 2023-07-18 NOTE — PROGRESS NOTES
"Leonard Garza is a 88 year old male who presents for:  Chief Complaint   Patient presents with     Follow Up     Parkinson's- patient reports they are well         Initial Vitals:  /66 (BP Location: Left arm, Patient Position: Sitting, Cuff Size: Adult Regular)   Pulse 54   SpO2 95%  Estimated body mass index is 28.43 kg/m  as calculated from the following:    Height as of 8/22/22: 1.727 m (5' 8\").    Weight as of 8/22/22: 84.8 kg (187 lb).. There is no height or weight on file to calculate BSA. BP completed using cuff size: regular    MoCA 7.3= 23/30     Lay Reyes  "

## 2023-07-18 NOTE — LETTER
7/18/2023         RE: Leonard Garza  [address] [facility] [address]        Dear Colleague,    Thank you for referring your patient, Leonard Garza, to the [facility]. Please see a copy of my visit note below.    ESTABLISHED PATIENT NEUROLOGY NOTE    DATE OF VISIT: 7/18/2023  CLINIC LOCATION: [facility]  MRN: [id number]  PATIENT NAME: Leonard Garza  YOB: 1935    REASON FOR VISIT:   Chief Complaint   Patient presents with     Follow Up     Parkinson's- patient reports they are well      SUBJECTIVE:                                                      HISTORY OF PRESENT ILLNESS: Patient is here to follow up regarding mild cognitive impairment and vascular parkinsonism.  The last visit was on 9/21/2022.  Please refer to my initial/other prior notes for further information.  He is accompanied by his wife today.    Since the last visit, the patient reports continued difficulty with his balance and several falls, but otherwise feels that his parkinsonism symptoms are stable.  Memory is the same.  He denies interval development of new neurological symptoms.  Wife agrees.  She has no additional concerns.  EXAM:                                                    Physical Exam:   Vitals: /66 (BP Location: Left arm, Patient Position: Sitting, Cuff Size: Adult Regular)   Pulse 54   SpO2 95%   Bruce Cognitive Assessment:    Bruce Cognitive Assessment (MOCA)  Visuospatial/Executive : 4  Naming: 3  Attention - Digits: 2  Attention - Letters: 1  Attention - Subtraction: 3  Language - Repeat: 1  Language - Fluency : 0  Abstraction: 2  Delayed Recall: 2  Orientation: 5  Education: 0  MOCA Score: 23  Administered by: : Lay GALDAMEZ     [place] Cognitive Assessment Score:  MOCA Score: 23/30.     General: pt is in NAD, cooperative.  Skin: normal turgor, moist mucous membranes, no lesions/rashes noticed.  HEENT: ATNC, white sclera, normal  conjunctiva.  Respiratory: Symmetric lung excursion, no accessory respiratory muscle use.  Abdomen: Non distended.  Neurological: awake, cooperative, follows commands, no exam changes compared to the previous visits.  ASSESSMENT AND PLAN:                                                    Assessment: 88 year old male patient presents for follow-up of vascular parkinsonism and mild cognitive impairment.  MoCA today is 23/30, compared to 24/30 in July 2021 and 23/30 in May 2022.  He tried Sinemet, but it caused intolerable nausea.  Amantadine and dopamine agonist could be considered in the future if necessary, but at the present time I do not think that we need to start any additional medications.  He would benefit from another round of Big and Loud Program.  Physical therapy might also be helpful for his balance difficulty.  I have placed necessary referrals.    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 Mild cognitive impairment  G31.84         Plan: At today's visit we thoroughly discussed current symptoms, cognitive test results (stable), available treatment options, and the plan.  We will repeat cognitive test at the next visit.    We decided to repeat Big and Loud Program.    No new medications.    Next follow-up appointment is in the next 9-12 months or earlier if needed.    Total Time: 31 minutes spent on the date of the encounter doing chart review, history and exam, documentation and further activities per the note.    Vasu Georges MD  [facility] Neurology  (Chart documentation was completed in part with Dragon voice-recognition software. Even though reviewed, some grammatical, spelling, and word errors may remain.)      Leonard Garza is a 88 year old male who presents for:  Chief Complaint   Patient presents with     Follow Up     Parkinson's- patient reports they are well         Initial Vitals:  /66 (BP Location: Left arm, Patient Position: Sitting,  "Cuff Size: Adult Regular)   Pulse 54   SpO2 95%  Estimated body mass index is 28.43 kg/m  as calculated from the following:    Height as of 8/22/22: 1.727 m (5' 8\").    Weight as of 8/22/22: 84.8 kg (187 lb).. There is no height or weight on file to calculate BSA. BP completed using cuff size: regular    MoCA 7.3= 23/30     Lay Reyes      Again, thank you for allowing me to participate in the care of your patient.        Sincerely,        Vasu Georges MD    "

## 2023-07-18 NOTE — PROGRESS NOTES
ESTABLISHED PATIENT NEUROLOGY NOTE    DATE OF VISIT: 7/18/2023  CLINIC LOCATION: [facility]  MRN: [id number]  PATIENT NAME: Leonard Garza  YOB: 1935    REASON FOR VISIT:   Chief Complaint   Patient presents with     Follow Up     Parkinson's- patient reports they are well      SUBJECTIVE:                                                      HISTORY OF PRESENT ILLNESS: Patient is here to follow up regarding mild cognitive impairment and vascular parkinsonism.  The last visit was on 9/21/2022.  Please refer to my initial/other prior notes for further information.  He is accompanied by his wife today.    Since the last visit, the patient reports continued difficulty with his balance and several falls, but otherwise feels that his parkinsonism symptoms are stable.  Memory is the same.  He denies interval development of new neurological symptoms.  Wife agrees.  She has no additional concerns.  EXAM:                                                    Physical Exam:   Vitals: /66 (BP Location: Left arm, Patient Position: Sitting, Cuff Size: Adult Regular)   Pulse 54   SpO2 95%   [place] Cognitive Assessment:    [place] Cognitive Assessment (MOCA)  Visuospatial/Executive : 4  Naming: 3  Attention - Digits: 2  Attention - Letters: 1  Attention - Subtraction: 3  Language - Repeat: 1  Language - Fluency : 0  Abstraction: 2  Delayed Recall: 2  Orientation: 5  Education: 0  MOCA Score: 23  Administered by: : Lay GALDAMEZ     [place] Cognitive Assessment Score:  MOCA Score: 23/30.     General: pt is in NAD, cooperative.  Skin: normal turgor, moist mucous membranes, no lesions/rashes noticed.  HEENT: ATNC, white sclera, normal conjunctiva.  Respiratory: Symmetric lung excursion, no accessory respiratory muscle use.  Abdomen: Non distended.  Neurological: awake, cooperative, follows commands, no exam changes compared to the previous visits.  ASSESSMENT AND PLAN:                                                     Assessment: 88 year old male patient presents for follow-up of vascular parkinsonism and mild cognitive impairment.  MoCA today is 23/30, compared to 24/30 in July 2021 and 23/30 in May 2022.  He tried Sinemet, but it caused intolerable nausea.  Amantadine and dopamine agonist could be considered in the future if necessary, but at the present time I do not think that we need to start any additional medications.  He would benefit from another round of Big and Loud Program.  Physical therapy might also be helpful for his balance difficulty.  I have placed necessary referrals.    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 Mild cognitive impairment  G31.84         Plan: At today's visit we thoroughly discussed current symptoms, cognitive test results (stable), available treatment options, and the plan.  We will repeat cognitive test at the next visit.    We decided to repeat Big and Loud Program.    No new medications.    Next follow-up appointment is in the next 9-12 months or earlier if needed.    Total Time: 31 minutes spent on the date of the encounter doing chart review, history and exam, documentation and further activities per the note.    Vasu Georges MD  [facility] Neurology  (Chart documentation was completed in part with Dragon voice-recognition software. Even though reviewed, some grammatical, spelling, and word errors may remain.)

## 2023-07-24 ENCOUNTER — PATIENT OUTREACH (OUTPATIENT)
Dept: CARE COORDINATION | Facility: CLINIC | Age: 88
End: 2023-07-24
Payer: MEDICARE

## 2023-08-07 ENCOUNTER — PATIENT OUTREACH (OUTPATIENT)
Dept: CARE COORDINATION | Facility: CLINIC | Age: 88
End: 2023-08-07
Payer: MEDICARE

## 2023-08-16 ENCOUNTER — TELEPHONE (OUTPATIENT)
Dept: NEUROLOGY | Facility: CLINIC | Age: 88
End: 2023-08-16
Payer: MEDICARE

## 2023-08-16 NOTE — TELEPHONE ENCOUNTER
[facility] Call Center    Phone Message    May a detailed message be left on voicemail: yes     Reason for Call: Order(s): Other:   Reason for requested: Order for physical therapy and Speech therapy need to go to: New home mailing address (temporary)  Leonard Garza  [facility] [address], [place], [place]  [address]    If questions, please call Raquel at     Date needed: ASAP  Provider name: ASCENCION Hauser    Action Taken: Message routed to:  [facility]    Travel Screening: Not Applicable

## 2023-08-17 NOTE — TELEPHONE ENCOUNTER
Printed the requested referral and got them ready to mail to the requested address, tried to contact Raquel to advise I had sent them but the phone rang without answer

## 2023-10-16 ENCOUNTER — APPOINTMENT (RX ONLY)
Dept: URBAN - METROPOLITAN AREA CLINIC 166 | Facility: CLINIC | Age: 88
Setting detail: DERMATOLOGY
End: 2023-10-16

## 2023-10-16 DIAGNOSIS — D22 MELANOCYTIC NEVI: ICD-10-CM

## 2023-10-16 DIAGNOSIS — Z85.828 PERSONAL HISTORY OF OTHER MALIGNANT NEOPLASM OF SKIN: ICD-10-CM

## 2023-10-16 DIAGNOSIS — L57.0 ACTINIC KERATOSIS: ICD-10-CM

## 2023-10-16 DIAGNOSIS — L82.1 OTHER SEBORRHEIC KERATOSIS: ICD-10-CM

## 2023-10-16 DIAGNOSIS — L81.4 OTHER MELANIN HYPERPIGMENTATION: ICD-10-CM

## 2023-10-16 DIAGNOSIS — Z71.89 OTHER SPECIFIED COUNSELING: ICD-10-CM

## 2023-10-16 DIAGNOSIS — L21.8 OTHER SEBORRHEIC DERMATITIS: ICD-10-CM

## 2023-10-16 DIAGNOSIS — D49.2 NEOPLASM OF UNSPECIFIED BEHAVIOR OF BONE, SOFT TISSUE, AND SKIN: ICD-10-CM

## 2023-10-16 PROBLEM — D22.9 MELANOCYTIC NEVI, UNSPECIFIED: Status: ACTIVE | Noted: 2023-10-16

## 2023-10-16 PROCEDURE — 11103 TANGNTL BX SKIN EA SEP/ADDL: CPT

## 2023-10-16 PROCEDURE — 99203 OFFICE O/P NEW LOW 30 MIN: CPT | Mod: 25

## 2023-10-16 PROCEDURE — ? PRESCRIPTION

## 2023-10-16 PROCEDURE — 17003 DESTRUCT PREMALG LES 2-14: CPT

## 2023-10-16 PROCEDURE — ? BIOPSY BY SHAVE METHOD

## 2023-10-16 PROCEDURE — ? OTHER

## 2023-10-16 PROCEDURE — ? LIQUID NITROGEN

## 2023-10-16 PROCEDURE — ? TREATMENT REGIMEN

## 2023-10-16 PROCEDURE — ? COUNSELING

## 2023-10-16 PROCEDURE — 11102 TANGNTL BX SKIN SINGLE LES: CPT

## 2023-10-16 PROCEDURE — 17000 DESTRUCT PREMALG LESION: CPT | Mod: 59

## 2023-10-16 RX ORDER — KETOCONAZOLE 20 MG/ML
SHAMPOO, SUSPENSION TOPICAL
Qty: 120 | Refills: 3 | Status: ERX | COMMUNITY
Start: 2023-10-16

## 2023-10-16 RX ADMIN — KETOCONAZOLE: 20 SHAMPOO, SUSPENSION TOPICAL at 00:00

## 2023-10-16 ASSESSMENT — LOCATION DETAILED DESCRIPTION DERM
LOCATION DETAILED: LEFT CENTRAL TEMPLE
LOCATION DETAILED: RIGHT MEDIAL FOREHEAD
LOCATION DETAILED: LEFT CENTRAL ZYGOMA
LOCATION DETAILED: RIGHT PROXIMAL DORSAL FOREARM
LOCATION DETAILED: LEFT SUPERIOR PREAURICULAR CHEEK
LOCATION DETAILED: UPPER STERNUM
LOCATION DETAILED: LEFT FOREHEAD
LOCATION DETAILED: RIGHT SUPERIOR PARIETAL SCALP
LOCATION DETAILED: RIGHT INFERIOR POSTERIOR NECK
LOCATION DETAILED: SUPERIOR MID FOREHEAD
LOCATION DETAILED: LEFT SUPERIOR MEDIAL MALAR CHEEK
LOCATION DETAILED: POSTERIOR MID-PARIETAL SCALP
LOCATION DETAILED: RIGHT FOREHEAD
LOCATION DETAILED: LEFT MEDIAL FRONTAL SCALP
LOCATION DETAILED: LEFT PROXIMAL DORSAL FOREARM
LOCATION DETAILED: LEFT CENTRAL PARIETAL SCALP
LOCATION DETAILED: LEFT SUPERIOR PARIETAL SCALP
LOCATION DETAILED: INFERIOR THORACIC SPINE
LOCATION DETAILED: RIGHT CENTRAL TEMPLE
LOCATION DETAILED: RIGHT PROXIMAL RADIAL DORSAL FOREARM

## 2023-10-16 ASSESSMENT — LOCATION ZONE DERM
LOCATION ZONE: FACE
LOCATION ZONE: TRUNK
LOCATION ZONE: ARM
LOCATION ZONE: SCALP
LOCATION ZONE: NECK

## 2023-10-16 ASSESSMENT — LOCATION SIMPLE DESCRIPTION DERM
LOCATION SIMPLE: LEFT FOREARM
LOCATION SIMPLE: UPPER BACK
LOCATION SIMPLE: POSTERIOR SCALP
LOCATION SIMPLE: RIGHT FOREARM
LOCATION SIMPLE: RIGHT TEMPLE
LOCATION SIMPLE: LEFT ZYGOMA
LOCATION SIMPLE: SUPERIOR FOREHEAD
LOCATION SIMPLE: LEFT TEMPLE
LOCATION SIMPLE: CHEST
LOCATION SIMPLE: SCALP
LOCATION SIMPLE: LEFT SCALP
LOCATION SIMPLE: LEFT CHEEK
LOCATION SIMPLE: POSTERIOR NECK
LOCATION SIMPLE: LEFT FOREHEAD
LOCATION SIMPLE: RIGHT FOREHEAD

## 2023-10-16 NOTE — PROCEDURE: COUNSELING
Sunscreen Recommendations: continue spf 30 + daily, hats, long sleeves
Nicotinamide Supplementation Recommendations: Continue 500 mg nicotinamide BID
Detail Level: Zone
Detail Level: Generalized
Detail Level: Detailed

## 2023-10-16 NOTE — PROCEDURE: OTHER
Other (Free Text): Unknown location
Detail Level: Zone
Render Risk Assessment In Note?: no
Note Text (......Xxx Chief Complaint.): This diagnosis correlates with the

## 2023-10-16 NOTE — PROCEDURE: LIQUID NITROGEN
Consent: The patient's consent was obtained including but not limited to risks of crusting, scabbing, blistering, scarring, darker or lighter pigmentary change, recurrence, incomplete removal and infection.
Show Aperture Variable?: Yes
Duration Of Freeze Thaw-Cycle (Seconds): 0
Detail Level: Detailed
Post-Care Instructions: I reviewed with the patient in detail post-care instructions. Patient is to wear sunprotection, and avoid picking at any of the treated lesions. Pt may apply Vaseline to crusted or scabbing areas.
Render Post-Care Instructions In Note?: no
Number Of Freeze-Thaw Cycles: 2 freeze-thaw cycles

## 2023-10-16 NOTE — PROCEDURE: TREATMENT REGIMEN
Plan to follow up in 6-8 weeks
Detail Level: Zone
Initiate Treatment: ketoconazole 2 % shampoo Shampoo scalp 2-3x weekly to scalp as needed.  Let sit for 5 min before rinsing.

## 2023-11-09 ENCOUNTER — APPOINTMENT (RX ONLY)
Dept: URBAN - METROPOLITAN AREA CLINIC 170 | Facility: CLINIC | Age: 88
Setting detail: DERMATOLOGY
End: 2023-11-09

## 2023-11-09 DIAGNOSIS — L57.0 ACTINIC KERATOSIS: ICD-10-CM

## 2023-11-09 PROBLEM — C44.329 SQUAMOUS CELL CARCINOMA OF SKIN OF OTHER PARTS OF FACE: Status: ACTIVE | Noted: 2023-11-09

## 2023-11-09 PROCEDURE — ? PRESCRIPTION

## 2023-11-09 PROCEDURE — ? COUNSELING

## 2023-11-09 PROCEDURE — ? TREATMENT REGIMEN

## 2023-11-09 PROCEDURE — 99214 OFFICE O/P EST MOD 30 MIN: CPT

## 2023-11-09 PROCEDURE — ? CONSULTATION FOR MOHS SURGERY

## 2023-11-09 RX ORDER — FLUOROURACIL 2 G/40G
CREAM TOPICAL
Qty: 40 | Refills: 0 | Status: ERX | COMMUNITY
Start: 2023-11-09

## 2023-11-09 RX ADMIN — FLUOROURACIL: 2 CREAM TOPICAL at 00:00

## 2023-11-09 ASSESSMENT — LOCATION SIMPLE DESCRIPTION DERM: LOCATION SIMPLE: SCALP

## 2023-11-09 ASSESSMENT — LOCATION ZONE DERM: LOCATION ZONE: SCALP

## 2023-11-09 ASSESSMENT — LOCATION DETAILED DESCRIPTION DERM: LOCATION DETAILED: LEFT SUPERIOR PARIETAL SCALP

## 2023-11-09 NOTE — PROCEDURE: CONSULTATION FOR MOHS SURGERY
Chart Reviewed  Care Everywhere updated  Immunizations reconciled  Health Maintenance updated  Ordered:  Upcoming:      
Detail Level: Detailed
Size Of Lesion: 0.6
X Size Of Lesion In Cm (Optional): 0.7
Name Of The Referring Provider For Procedure: Margaret Hubbard MD
Incorporate Mauc In Note: Yes

## 2023-11-17 ENCOUNTER — APPOINTMENT (RX ONLY)
Dept: URBAN - METROPOLITAN AREA CLINIC 170 | Facility: CLINIC | Age: 88
Setting detail: DERMATOLOGY
End: 2023-11-17

## 2023-11-17 PROBLEM — C44.329 SQUAMOUS CELL CARCINOMA OF SKIN OF OTHER PARTS OF FACE: Status: ACTIVE | Noted: 2023-11-17

## 2023-11-17 PROCEDURE — 17311 MOHS 1 STAGE H/N/HF/G: CPT

## 2023-11-17 PROCEDURE — ? MOHS SURGERY

## 2023-11-17 PROCEDURE — 12052 INTMD RPR FACE/MM 2.6-5.0 CM: CPT

## 2023-11-17 NOTE — PROCEDURE: MOHS SURGERY
Mohs Case Number: [id number]
Date Of Previous Biopsy (Optional): 10/1623
Previous Accession (Optional): [id number]
Biopsy Photograph Reviewed: Yes
Referring Physician (Optional): Margaret Hubbard
Consent Type: Consent 1 (Standard)
Eye Shield Used: No
Surgeon Performing Repair (Optional): Momo Augustin
Initial Size Of Lesion: 0.7
X Size Of Lesion In Cm (Optional): 0.8
Number Of Stages: 1
Primary Defect Width In Cm (Final Defect Size - Required For Flaps/Grafts): 1.1
Repair Type: Intermediate Layered Repair
Oculoplastic Surgeon Procedure Text (A): After obtaining clear surgical margins the patient was sent to oculoplastics for surgical repair.  The patient understands they will receive post-surgical care and follow-up from the referring physician's office.
Oculoplastic Surgeon Procedure Text (B): After obtaining clear surgical margins the patient was sent to oculoplastics for surgical repair.  The patient understands they will receive post-surgical care and follow-up from the referring physician's office.
Otolaryngologist Procedure Text (A): After obtaining clear surgical margins the patient was sent to otolaryngology for surgical repair.  The patient understands they will receive post-surgical care and follow-up from the referring physician's office.
Otolaryngologist Procedure Text (B): After obtaining clear surgical margins the patient was sent to otolaryngology for surgical repair.  The patient understands they will receive post-surgical care and follow-up from the referring physician's office.
Plastic Surgeon Procedure Text (A): After obtaining clear surgical margins the patient was sent to plastics for surgical repair.  The patient understands they will receive post-surgical care and follow-up from the referring physician's office.
Plastic Surgeon Procedure Text (B): After obtaining clear surgical margins the patient was sent to plastics for surgical repair.  The patient understands they will receive post-surgical care and follow-up from the referring physician's office.
Mid-Level Procedure Text (A): After obtaining clear surgical margins the patient was sent to a mid-level provider for surgical repair.  The patient understands they will receive post-surgical care and follow-up from the mid-level provider.
Mid-Level Procedure Text (B): After obtaining clear surgical margins the patient was sent to a mid-level provider for surgical repair.  The patient understands they will receive post-surgical care and follow-up from the mid-level provider.
Provider Procedure Text (A): After obtaining clear surgical margins the defect was repaired by another provider.
Asc Procedure Text (A): After obtaining clear surgical margins the patient was sent to an ASC for surgical repair.  The patient understands they will receive post-surgical care and follow-up from the ASC physician.
Asc Procedure Text (B): After obtaining clear surgical margins the patient was sent to an ASC for surgical repair.  The patient understands they will receive post-surgical care and follow-up from the ASC physician.
Simple / Intermediate / Complex Repair - Final Wound Length In Cm: 3.2
Suturegard Retention Suture: 2-0 Nylon
Retention Suture Bite Size: 3 mm
Length To Time In Minutes Device Was In Place: 10
Undermining Type: Entire Wound
Debridement Text: The wound edges were debrided prior to proceeding with the closure to facilitate wound healing.
Helical Rim Text: The closure involved the helical rim.
Vermilion Border Text: The closure involved the vermilion border.
Nostril Rim Text: The closure involved the nostril rim.
Retention Suture Text: Retention sutures were placed to support the closure and prevent dehiscence.
Secondary Defect Length In Cm (Required For Flaps): 0
Area H Indication Text: Tumor located in Area H (eyelids, eyebrows, nose, lips, chin, ear, pre-auricular, post-auricular, temple, genitalia, hands, feet, ankles and areola).  Tissue conservation and complete peripheral and deep margin assessment are critical in these anatomic locations.
Area M Indication Text: Tumor located in Area M (cheek, forehead, scalp, neck, jawline and pretibial skin).  Mohs surgery is indicated for tumors in these anatomic locations.
Area L Indication Text: Tumor is located in Area L (trunk and extremities).  Mohs surgery is indicated for larger tumors, or tumors with aggressive histologic features, in these anatomic locations.
Depth Of Tumor Invasion (For Histology): tumor not visualized (deep and peripheral margins are clear of tumor)
Perineural Invasion (For Histology - Be Specific If Possible): absent
Special Stains Stage 1 - Results: Base On Clearance Noted Above
Stage 2: Additional Anesthesia Type: 1% lidocaine with epinephrine
Staging Info: By selecting yes to the question above you will include information on AJCC 8 tumor staging in your Mohs note. Information on tumor staging will be automatically added for SCCs on the head and neck. AJCC 8 includes tumor size, tumor depth, perineural involvement and bone invasion.
Tumor Depth: Less than 6mm from granular layer and no invasion beyond the subcutaneous fat
Was The Patient On Physician Recommended Anticoagulation Therapy?: Please Select the Appropriate Response
Medical Necessity Statement: Based on my clinical judgement, Mohs surgery is the most appropriate treatment for this cancer compared to other treatments.
Alternatives Discussed Intro (Do Not Add Period): I discussed alternative treatments to Mohs surgery and specifically discussed the risks and benefits of
Consent 1/Introductory Paragraph: Mohs surgery was explained to the patient and consent was obtained. The risks, benefits and alternatives to therapy were discussed in detail. Specifically, the risks of infection, scarring, bleeding, prolonged wound healing, incomplete removal, allergy to anesthesia or topical antimicrobials, sensory or motor nerve injury, and recurrence were addressed. Prior to the procedure, the treatment site was clearly identified and confirmed by the patient. All components of Universal Protocol/PAUSE Rule completed.
Consent 2/Introductory Paragraph: Mohs surgery was explained to the patient and consent was obtained. The risks, benefits and alternatives to therapy were discussed in detail. Specifically, the risks of infection, scarring, bleeding, prolonged wound healing, incomplete removal, allergy to anesthetic, cleansing solutions, or medication, nerve injury and recurrence were addressed. Prior to the procedure, the treatment site was clearly identified and confirmed by the patient. All components of Universal Protocol/PAUSE Rule completed.
Consent 3/Introductory Paragraph: I gave the patient a chance to ask questions they had about the procedure.  Following this I explained the Mohs procedure and consent was obtained. The risks, benefits and alternatives to therapy were discussed in detail. Specifically, the risks of infection, scarring, bleeding, prolonged wound healing, incomplete removal, allergy to anesthesia, nerve injury and recurrence were addressed. Prior to the procedure, the treatment site was clearly identified and confirmed by the patient. All components of Universal Protocol/PAUSE Rule completed.
Consent (Temporal Branch)/Introductory Paragraph: The rationale for Mohs was explained to the patient and consent was obtained. The risks, benefits and alternatives to therapy were discussed in detail. Specifically, the risks of damage to the temporal branch of the facial nerve, infection, scarring, bleeding, prolonged wound healing, incomplete removal, allergy to anesthesia, and recurrence were addressed. Prior to the procedure, the treatment site was clearly identified and confirmed by the patient. All components of Universal Protocol/PAUSE Rule completed.
Consent (Marginal Mandibular)/Introductory Paragraph: The rationale for Mohs was explained to the patient and consent was obtained. The risks, benefits and alternatives to therapy were discussed in detail. Specifically, the risks of damage to the marginal mandibular branch of the facial nerve, infection, scarring, bleeding, prolonged wound healing, incomplete removal, allergy to anesthesia, and recurrence were addressed. Prior to the procedure, the treatment site was clearly identified and confirmed by the patient. All components of Universal Protocol/PAUSE Rule completed.
Consent (Spinal Accessory)/Introductory Paragraph: The rationale for Mohs was explained to the patient and consent was obtained. The risks, benefits and alternatives to therapy were discussed in detail. Specifically, the risks of damage to the spinal accessory nerve, infection, scarring, bleeding, prolonged wound healing, incomplete removal, allergy to anesthesia, and recurrence were addressed. Prior to the procedure, the treatment site was clearly identified and confirmed by the patient. All components of Universal Protocol/PAUSE Rule completed.
Consent (Near Eyelid Margin)/Introductory Paragraph: The rationale for Mohs was explained to the patient and consent was obtained. The risks, benefits and alternatives to therapy were discussed in detail. Specifically, the risks of ectropion or eyelid deformity, infection, scarring, bleeding, prolonged wound healing, incomplete removal, allergy to anesthesia, nerve injury and recurrence were addressed. Prior to the procedure, the treatment site was clearly identified and confirmed by the patient. All components of Universal Protocol/PAUSE Rule completed.
Consent (Ear)/Introductory Paragraph: The rationale for Mohs was explained to the patient and consent was obtained. The risks, benefits and alternatives to therapy were discussed in detail. Specifically, the risks of ear deformity, infection, scarring, bleeding, prolonged wound healing, incomplete removal, allergy to anesthesia, nerve injury and recurrence were addressed. Prior to the procedure, the treatment site was clearly identified and confirmed by the patient. All components of Universal Protocol/PAUSE Rule completed.
Consent (Nose)/Introductory Paragraph: The rationale for Mohs was explained to the patient and consent was obtained. The risks, benefits and alternatives to therapy were discussed in detail. Specifically, the risks of nasal deformity, changes in the flow of air through the nose, infection, scarring, bleeding, prolonged wound healing, incomplete removal, allergy to anesthesia, nerve injury and recurrence were addressed. Prior to the procedure, the treatment site was clearly identified and confirmed by the patient. All components of Universal Protocol/PAUSE Rule completed.
Consent (Lip)/Introductory Paragraph: The rationale for Mohs was explained to the patient and consent was obtained. The risks, benefits and alternatives to therapy were discussed in detail. Specifically, the risks of lip deformity, changes in the oral aperture, infection, scarring, bleeding, prolonged wound healing, incomplete removal, allergy to anesthesia, nerve injury and recurrence were addressed. Prior to the procedure, the treatment site was clearly identified and confirmed by the patient. All components of Universal Protocol/PAUSE Rule completed.
Consent (Scalp)/Introductory Paragraph: The rationale for Mohs was explained to the patient and consent was obtained. The risks, benefits and alternatives to therapy were discussed in detail. Specifically, the risks of changes in hair growth pattern secondary to repair, infection, scarring, bleeding, prolonged wound healing, incomplete removal, allergy to anesthesia, nerve injury and recurrence were addressed. Prior to the procedure, the treatment site was clearly identified and confirmed by the patient. All components of Universal Protocol/PAUSE Rule completed.
Detail Level: Detailed
Postop Diagnosis: same
Anesthesia Type: 0.5% lidocaine with 1:200,000 epinephrine and a 1:10 solution of 8.4% sodium bicarbonate
Additional Anesthesia Volume In Cc: 6
Hemostasis: Electrodesiccation
Estimated Blood Loss (Cc): minimal
Repair Anesthesia Method: local infiltration
Anesthesia Type: 0.5% lidocaine with 1:200,000 epinephrine and a 1:10 solution of 4.2% sodium bicarbonate
Brow Lift Text: A midfrontal incision was made medially to the defect to allow access to the tissues just superior to the left eyebrow. Following careful dissection inferiorly in a supraperiosteal plane to the level of the left eyebrow, several 3-0 monocryl sutures were used to resuspend the eyebrow orbicularis oculi muscular unit to the superior frontal bone periosteum. This resulted in an appropriate reapproximation of static eyebrow symmetry and correction of the left brow ptosis.
Deep Sutures: 5-0 Monocryl
Epidermal Sutures: 6-0 Fast Absorbing Gut
Epidermal Closure: running
Suturegard Intro: Intraoperative tissue expansion was performed, utilizing the SUTUREGARD device, in order to reduce wound tension.
Suturegard Body: The suture ends were repeatedly re-tightened and re-clamped to achieve the desired tissue expansion.
Hemigard Intro: Due to skin fragility and wound tension, it was decided to use HEMIGARD adhesive retention suture devices to permit a linear closure. The skin was cleaned and dried for a 6cm distance away from the wound. Excessive hair, if present, was removed to allow for adhesion.
Hemigard Postcare Instructions: The HEMIGARD strips are to remain completely dry for at least 5-7 days.
Donor Site Anesthesia Type: same as repair anesthesia
Epidermal Closure Graft Donor Site (Optional): simple interrupted
Graft Donor Site Bandage (Optional-Leave Blank If You Don't Want In Note): Steri-strips and a pressure bandage were applied to the donor site.
Closure 2 Information: This tab is for additional flaps and grafts, including complex repair and grafts and complex repair and flaps. You can also specify a different location for the additional defect, if the location is the same you do not need to select a new one. We will insert the automated text for the repair you select below just as we do for solitary flaps and grafts. Please note that at this time if you select a location with a different insurance zone you will need to override the ICD10 and CPT if appropriate.
Closure 3 Information: This tab is for additional flaps and grafts above and beyond our usual structured repairs.  Please note if you enter information here it will not currently bill and you will need to add the billing information manually.
Closure 4 Information: This tab is for additional flaps and grafts above and beyond our usual structured repairs.  Please note if you enter information here it will not currently bill and you will need to add the billing information manually.
Wound Care: Petrolatum
Dressing: pressure dressing with telfa
Dressing (No Sutures): dry sterile dressing
Unna Boot Text: An Unna boot was placed to help immobilize the limb and facilitate more rapid healing.
Home Suture Removal Text: Patient was provided instructions on removing sutures and will remove their sutures at home.  If they have any questions or difficulties they will call the office.
Post-Care Instructions: Written post-operative would care instructions were provided and reviewed with the patient. Patient is not to engage in any heavy lifting, exercise, or swimming for the next 7-14 days. Should the patient develop any fevers, chills, bleeding, severe pain patient will contact the office immediately.
Pain Refusal Text: I offered to prescribe pain medication but the patient refused to take this medication.
Mauc Instructions: By selecting yes to the question below the MAUC number will be added into the note.  This will be calculated automatically based on the diagnosis chosen, the size entered, the body zone selected (H,M,L) and the specific indications you chose. You will also have the option to override the Mohs AUC if you disagree with the automatically calculated number and this option is found in the Case Summary tab.
Where Do You Want The Question To Include Opioid Counseling Located?: Case Summary Tab
Eye Protection Verbiage: Before proceeding with the stage, a plastic scleral shield was inserted. The globe was anesthetized with a few drops of 1% lidocaine with 1:100,000 epinephrine. Then, an appropriate sized scleral shield was chosen and coated with lacrilube ointment. The shield was gently inserted and left in place for the duration of each stage. After the stage was completed, the shield was gently removed.
Mohs Method Verbiage: A full thickness incision was made surrounding the clinically apparent tumor margins. Superficial orienting hashes were made, and the specimen was harvested as a microscopic controlled layer.
Surgeon/Pathologist Verbiage (Will Incorporate Name Of Surgeon From Intro If Not Blank): operated in two distinct and integrated capacities as the surgeon and pathologist.
Mohs Histo Method Verbiage: Each section was then chromacoded and processed in the Mohs lab using the Mohs protocol and submitted for frozen section.
Subsequent Stages Histo Method Verbiage: Using a similar technique to that described above, a thin layer of tissue was removed from all areas where tumor was visible on the previous stage.  The tissue was again oriented, mapped, dyed, and processed as above.
Mohs Rapid Report Verbiage: The area of clinically evident tumor was marked with skin marking ink and appropriately hatched.  The initial incision was made following the Mohs approach through the skin.  The specimen was taken to the lab, divided into the necessary number of pieces, chromacoded and processed according to the Mohs protocol.  This was repeated in successive stages until a tumor free defect was achieved.
Complex Repair Preamble Text (Leave Blank If You Do Not Want): Extensive wide undermining was performed.
Intermediate Repair Preamble Text (Leave Blank If You Do Not Want): Undermining was performed.
Crescentic Intermediate Repair Preamble Text (Leave Blank If You Do Not Want): Undermining was performed with blunt dissection.
Non-Graft Cartilage Fenestration Text: The cartilage was fenestrated with a 2mm punch biopsy to help facilitate healing.
Graft Cartilage Fenestration Text: The cartilage was fenestrated with a 2mm punch biopsy to help facilitate graft survival and healing.
Secondary Intention Text (Leave Blank If You Do Not Want): The defect will heal with secondary intention.
No Repair - Repaired With Adjacent Surgical Defect Text (Leave Blank If You Do Not Want): After obtaining clear surgical margins the defect was repaired concurrently with another surgical defect which was in close approximation.
Adjacent Tissue Transfer Text: The defect edges were debeveled with a #15 scalpel blade.  Given the location of the defect and the proximity to free margins an adjacent tissue transfer was deemed most appropriate.  Using a sterile surgical marker, an appropriate flap was drawn incorporating the defect and placing the expected incisions within the relaxed skin tension lines where possible.    The area thus outlined was incised deep to adipose tissue with a #15 scalpel blade.  The skin margins were undermined to an appropriate distance in all directions utilizing iris scissors.
Advancement Flap (Single) Text: The defect edges were debeveled with a #15 scalpel blade.  Given the location of the defect and the proximity to free margins a single advancement flap was deemed most appropriate.  Using a sterile surgical marker, an appropriate advancement flap was drawn incorporating the defect and placing the expected incisions within the relaxed skin tension lines where possible.    The area thus outlined was incised deep to adipose tissue with a #15 scalpel blade.  The skin margins were undermined to an appropriate distance in all directions utilizing iris scissors.
Advancement Flap (Double) Text: The defect edges were debeveled with a #15 scalpel blade.  Given the location of the defect and the proximity to free margins a double advancement flap was deemed most appropriate.  Using a sterile surgical marker, the appropriate advancement flaps were drawn incorporating the defect and placing the expected incisions within the relaxed skin tension lines where possible.    The area thus outlined was incised deep to adipose tissue with a #15 scalpel blade.  The skin margins were undermined to an appropriate distance in all directions utilizing iris scissors.
Burow's Advancement Flap Text: The defect edges were debeveled with a #15 scalpel blade.  Given the location of the defect and the proximity to free margins a Burow's advancement flap was deemed most appropriate.  Using a sterile surgical marker, the appropriate advancement flap was drawn incorporating the defect and placing the expected incisions within the relaxed skin tension lines where possible.    The area thus outlined was incised deep to adipose tissue with a #15 scalpel blade.  The skin margins were undermined to an appropriate distance in all directions utilizing iris scissors.
Chonodrocutaneous Helical Advancement Flap Text: The defect edges were debeveled with a #15 scalpel blade.  Given the location of the defect and the proximity to free margins a chondrocutaneous helical advancement flap was deemed most appropriate.  Using a sterile surgical marker, the appropriate advancement flap was drawn incorporating the defect and placing the expected incisions within the relaxed skin tension lines where possible.    The area thus outlined was incised deep to adipose tissue with a #15 scalpel blade.  The skin margins were undermined to an appropriate distance in all directions utilizing iris scissors.
Crescentic Advancement Flap Text: The defect edges were debeveled with a #15 scalpel blade.  Given the location of the defect and the proximity to free margins a crescentic advancement flap was deemed most appropriate.  Using a sterile surgical marker, the appropriate advancement flap was drawn incorporating the defect and placing the expected incisions within the relaxed skin tension lines where possible.    The area thus outlined was incised deep to adipose tissue with a #15 scalpel blade.  The skin margins were undermined to an appropriate distance in all directions utilizing iris scissors.
A-T Advancement Flap Text: The defect edges were debeveled with a #15 scalpel blade.  Given the location of the defect, shape of the defect and the proximity to free margins an A-T advancement flap was deemed most appropriate.  Using a sterile surgical marker, an appropriate advancement flap was drawn incorporating the defect and placing the expected incisions within the relaxed skin tension lines where possible.    The area thus outlined was incised deep to adipose tissue with a #15 scalpel blade.  The skin margins were undermined to an appropriate distance in all directions utilizing iris scissors.
O-T Advancement Flap Text: The defect edges were debeveled with a #15 scalpel blade.  Given the location of the defect, shape of the defect and the proximity to free margins an O-T advancement flap was deemed most appropriate.  Using a sterile surgical marker, an appropriate advancement flap was drawn incorporating the defect and placing the expected incisions within the relaxed skin tension lines where possible.    The area thus outlined was incised deep to adipose tissue with a #15 scalpel blade.  The skin margins were undermined to an appropriate distance in all directions utilizing iris scissors.
O-L Flap Text: The defect edges were debeveled with a #15 scalpel blade.  Given the location of the defect, shape of the defect and the proximity to free margins an O-L flap was deemed most appropriate.  Using a sterile surgical marker, an appropriate advancement flap was drawn incorporating the defect and placing the expected incisions within the relaxed skin tension lines where possible.    The area thus outlined was incised deep to adipose tissue with a #15 scalpel blade.  The skin margins were undermined to an appropriate distance in all directions utilizing iris scissors.
O-Z Flap Text: The defect edges were debeveled with a #15 scalpel blade.  Given the location of the defect, shape of the defect and the proximity to free margins an O-Z flap was deemed most appropriate.  Using a sterile surgical marker, an appropriate transposition flap was drawn incorporating the defect and placing the expected incisions within the relaxed skin tension lines where possible. The area thus outlined was incised deep to adipose tissue with a #15 scalpel blade.  The skin margins were undermined to an appropriate distance in all directions utilizing iris scissors.
Double O-Z Flap Text: The defect edges were debeveled with a #15 scalpel blade.  Given the location of the defect, shape of the defect and the proximity to free margins a Double O-Z flap was deemed most appropriate.  Using a sterile surgical marker, an appropriate transposition flap was drawn incorporating the defect and placing the expected incisions within the relaxed skin tension lines where possible. The area thus outlined was incised deep to adipose tissue with a #15 scalpel blade.  The skin margins were undermined to an appropriate distance in all directions utilizing iris scissors.
V-Y Flap Text: The defect edges were debeveled with a #15 scalpel blade.  Given the location of the defect, shape of the defect and the proximity to free margins a V-Y flap was deemed most appropriate.  Using a sterile surgical marker, an appropriate advancement flap was drawn incorporating the defect and placing the expected incisions within the relaxed skin tension lines where possible.    The area thus outlined was incised deep to adipose tissue with a #15 scalpel blade.  The skin margins were undermined to an appropriate distance in all directions utilizing iris scissors.
Advancement-Rotation Flap Text: The defect edges were debeveled with a #15 scalpel blade.  Given the location of the defect, shape of the defect and the proximity to free margins an advancement-rotation flap was deemed most appropriate.  Using a sterile surgical marker, an appropriate flap was drawn incorporating the defect and placing the expected incisions within the relaxed skin tension lines where possible. The area thus outlined was incised deep to adipose tissue with a #15 scalpel blade.  The skin margins were undermined to an appropriate distance in all directions utilizing iris scissors.
Mercedes Flap Text: The defect edges were debeveled with a #15 scalpel blade.  Given the location of the defect, shape of the defect and the proximity to free margins a Mercedes flap was deemed most appropriate.  Using a sterile surgical marker, an appropriate advancement flap was drawn incorporating the defect and placing the expected incisions within the relaxed skin tension lines where possible. The area thus outlined was incised deep to adipose tissue with a #15 scalpel blade.  The skin margins were undermined to an appropriate distance in all directions utilizing iris scissors.
Modified Advancement Flap Text: The defect edges were debeveled with a #15 scalpel blade.  Given the location of the defect, shape of the defect and the proximity to free margins a modified advancement flap was deemed most appropriate.  Using a sterile surgical marker, an appropriate advancement flap was drawn incorporating the defect and placing the expected incisions within the relaxed skin tension lines where possible.    The area thus outlined was incised deep to adipose tissue with a #15 scalpel blade.  The skin margins were undermined to an appropriate distance in all directions utilizing iris scissors.
Mucosal Advancement Flap Text: Given the location of the defect, shape of the defect and the proximity to free margins a mucosal advancement flap was deemed most appropriate. Incisions were made with a 15 blade scalpel in the appropriate fashion along the cutaneous vermilion border and the mucosal lip. The remaining actinically damaged mucosal tissue was excised.  The mucosal advancement flap was then elevated to the gingival sulcus with care taken to preserve the neurovascular structures and advanced into the primary defect. Care was taken to ensure that precise realignment of the vermilion border was achieved.
Peng Advancement Flap Text: The defect edges were debeveled with a #15 scalpel blade.  Given the location of the defect, shape of the defect and the proximity to free margins a Peng advancement flap was deemed most appropriate.  Using a sterile surgical marker, an appropriate advancement flap was drawn incorporating the defect and placing the expected incisions within the relaxed skin tension lines where possible. The area thus outlined was incised deep to adipose tissue with a #15 scalpel blade.  The skin margins were undermined to an appropriate distance in all directions utilizing iris scissors.
Hatchet Flap Text: The defect edges were debeveled with a #15 scalpel blade.  Given the location of the defect, shape of the defect and the proximity to free margins a hatchet flap was deemed most appropriate.  Using a sterile surgical marker, an appropriate hatchet flap was drawn incorporating the defect and placing the expected incisions within the relaxed skin tension lines where possible.    The area thus outlined was incised deep to adipose tissue with a #15 scalpel blade.  The skin margins were undermined to an appropriate distance in all directions utilizing iris scissors.
Rotation Flap Text: The defect edges were debeveled with a #15 scalpel blade.  Given the location of the defect, shape of the defect and the proximity to free margins a rotation flap was deemed most appropriate.  Using a sterile surgical marker, an appropriate rotation flap was drawn incorporating the defect and placing the expected incisions within the relaxed skin tension lines where possible.    The area thus outlined was incised deep to adipose tissue with a #15 scalpel blade.  The skin margins were undermined to an appropriate distance in all directions utilizing iris scissors.
Bilateral Rotation Flap Text: The defect edges were debeveled with a #15 scalpel blade. Given the location of the defect, shape of the defect and the proximity to free margins a bilateral rotation flap was deemed most appropriate. Using a sterile surgical marker, an appropriate rotation flap was drawn incorporating the defect and placing the expected incisions within the relaxed skin tension lines where possible. The area thus outlined was incised deep to adipose tissue with a #15 scalpel blade. The skin margins were undermined to an appropriate distance in all directions utilizing iris scissors. Following this, the designed flap was carried over into the primary defect and sutured into place.
Spiral Flap Text: The defect edges were debeveled with a #15 scalpel blade.  Given the location of the defect, shape of the defect and the proximity to free margins a spiral flap was deemed most appropriate.  Using a sterile surgical marker, an appropriate rotation flap was drawn incorporating the defect and placing the expected incisions within the relaxed skin tension lines where possible. The area thus outlined was incised deep to adipose tissue with a #15 scalpel blade.  The skin margins were undermined to an appropriate distance in all directions utilizing iris scissors.
Staged Advancement Flap Text: The defect edges were debeveled with a #15 scalpel blade.  Given the location of the defect, shape of the defect and the proximity to free margins a staged advancement flap was deemed most appropriate.  Using a sterile surgical marker, an appropriate advancement flap was drawn incorporating the defect and placing the expected incisions within the relaxed skin tension lines where possible. The area thus outlined was incised deep to adipose tissue with a #15 scalpel blade.  The skin margins were undermined to an appropriate distance in all directions utilizing iris scissors.
Star Wedge Flap Text: The defect edges were debeveled with a #15 scalpel blade.  Given the location of the defect, shape of the defect and the proximity to free margins a star wedge flap was deemed most appropriate.  Using a sterile surgical marker, an appropriate rotation flap was drawn incorporating the defect and placing the expected incisions within the relaxed skin tension lines where possible. The area thus outlined was incised deep to adipose tissue with a #15 scalpel blade.  The skin margins were undermined to an appropriate distance in all directions utilizing iris scissors.
Transposition Flap Text: The defect edges were debeveled with a #15 scalpel blade.  Given the location of the defect and the proximity to free margins a transposition flap was deemed most appropriate.  Using a sterile surgical marker, an appropriate transposition flap was drawn incorporating the defect.    The area thus outlined was incised deep to adipose tissue with a #15 scalpel blade.  The skin margins were undermined to an appropriate distance in all directions utilizing iris scissors.
Muscle Hinge Flap Text: The defect edges were debeveled with a #15 scalpel blade.  Given the size, depth and location of the defect and the proximity to free margins a muscle hinge flap was deemed most appropriate.  Using a sterile surgical marker, an appropriate hinge flap was drawn incorporating the defect. The area thus outlined was incised with a #15 scalpel blade.  The skin margins were undermined to an appropriate distance in all directions utilizing iris scissors.
Mustarde Flap Text: The defect edges were debeveled with a #15 scalpel blade.  Given the size, depth and location of the defect and the proximity to free margins a Mustarde flap was deemed most appropriate.  Using a sterile surgical marker, an appropriate flap was drawn incorporating the defect. The area thus outlined was incised with a #15 scalpel blade.  The skin margins were undermined to an appropriate distance in all directions utilizing iris scissors.
Nasal Turnover Hinge Flap Text: The defect edges were debeveled with a #15 scalpel blade.  Given the size, depth, location of the defect and the defect being full thickness a nasal turnover hinge flap was deemed most appropriate.  Using a sterile surgical marker, an appropriate hinge flap was drawn incorporating the defect. The area thus outlined was incised with a #15 scalpel blade. The flap was designed to recreate the nasal mucosal lining and the alar rim. The skin margins were undermined to an appropriate distance in all directions utilizing iris scissors.
Nasalis-Muscle-Based Myocutaneous Island Pedicle Flap Text: Using a #15 blade, an incision was made around the donor flap to the level of the nasalis muscle. Wide lateral undermining was then performed in both the subcutaneous plane above the nasalis muscle, and in a submuscular plane just above periosteum. This allowed the formation of a free nasalis muscle axial pedicle (based on the angular artery) which was still attached to the actual cutaneous flap, increasing its mobility and vascular viability. Hemostasis was obtained with pinpoint electrocoagulation. The flap was mobilized into position and the pivotal anchor points positioned and stabilized with buried interrupted sutures. Subcutaneous and dermal tissues were closed in a multilayered fashion with sutures. Tissue redundancies were excised, and the epidermal edges were apposed without significant tension and sutured with sutures.
Orbicularis Oris Muscle Flap Text: The defect edges were debeveled with a #15 scalpel blade.  Given that the defect affected the competency of the oral sphincter an orbicularis oris muscle flap was deemed most appropriate to restore this competency and normal muscle function.  Using a sterile surgical marker, an appropriate flap was drawn incorporating the defect. The area thus outlined was incised with a #15 scalpel blade.
Melolabial Transposition Flap Text: The defect edges were debeveled with a #15 scalpel blade.  Given the location of the defect and the proximity to free margins a melolabial flap was deemed most appropriate.  Using a sterile surgical marker, an appropriate melolabial transposition flap was drawn incorporating the defect.    The area thus outlined was incised deep to adipose tissue with a #15 scalpel blade.  The skin margins were undermined to an appropriate distance in all directions utilizing iris scissors.
Rhombic Flap Text: The defect edges were debeveled with a #15 scalpel blade.  Given the location of the defect and the proximity to free margins a rhombic flap was deemed most appropriate.  Using a sterile surgical marker, an appropriate rhombic flap was drawn incorporating the defect.    The area thus outlined was incised deep to adipose tissue with a #15 scalpel blade.  The skin margins were undermined to an appropriate distance in all directions utilizing iris scissors.
Rhomboid Transposition Flap Text: The defect edges were debeveled with a #15 scalpel blade.  Given the location of the defect and the proximity to free margins a rhomboid transposition flap was deemed most appropriate.  Using a sterile surgical marker, an appropriate rhomboid flap was drawn incorporating the defect.    The area thus outlined was incised deep to adipose tissue with a #15 scalpel blade.  The skin margins were undermined to an appropriate distance in all directions utilizing iris scissors.
Bi-Rhombic Flap Text: The defect edges were debeveled with a #15 scalpel blade.  Given the location of the defect and the proximity to free margins a bi-rhombic flap was deemed most appropriate.  Using a sterile surgical marker, an appropriate rhombic flap was drawn incorporating the defect. The area thus outlined was incised deep to adipose tissue with a #15 scalpel blade.  The skin margins were undermined to an appropriate distance in all directions utilizing iris scissors.
Helical Rim Advancement Flap Text: The defect edges were debeveled with a #15 blade scalpel.  Given the location of the defect and the proximity to free margins (helical rim) a double helical rim advancement flap was deemed most appropriate.  Using a sterile surgical marker, the appropriate advancement flaps were drawn incorporating the defect and placing the expected incisions between the helical rim and antihelix where possible.  The area thus outlined was incised through and through with a #15 scalpel blade.  With a skin hook and iris scissors, the flaps were gently and sharply undermined and freed up.
Bilateral Helical Rim Advancement Flap Text: The defect edges were debeveled with a #15 blade scalpel.  Given the location of the defect and the proximity to free margins (helical rim) a bilateral helical rim advancement flap was deemed most appropriate.  Using a sterile surgical marker, the appropriate advancement flaps were drawn incorporating the defect and placing the expected incisions between the helical rim and antihelix where possible.  The area thus outlined was incised through and through with a #15 scalpel blade.  With a skin hook and iris scissors, the flaps were gently and sharply undermined and freed up.
Ear Star Wedge Flap Text: The defect edges were debeveled with a #15 blade scalpel.  Given the location of the defect and the proximity to free margins (helical rim) an ear star wedge flap was deemed most appropriate.  Using a sterile surgical marker, the appropriate flap was drawn incorporating the defect and placing the expected incisions between the helical rim and antihelix where possible.  The area thus outlined was incised through and through with a #15 scalpel blade.
Banner Transposition Flap Text: The defect edges were debeveled with a #15 scalpel blade.  Given the location of the defect and the proximity to free margins a Banner transposition flap was deemed most appropriate.  Using a sterile surgical marker, an appropriate flap drawn around the defect. The area thus outlined was incised deep to adipose tissue with a #15 scalpel blade.  The skin margins were undermined to an appropriate distance in all directions utilizing iris scissors.
Bilobed Flap Text: The defect edges were debeveled with a #15 scalpel blade.  Given the location of the defect and the proximity to free margins a bilobe flap was deemed most appropriate.  Using a sterile surgical marker, an appropriate bilobe flap drawn around the defect.    The area thus outlined was incised deep to adipose tissue with a #15 scalpel blade.  The skin margins were undermined to an appropriate distance in all directions utilizing iris scissors.
Bilobed Transposition Flap Text: The defect edges were debeveled with a #15 scalpel blade.  Given the location of the defect and the proximity to free margins a bilobed transposition flap was deemed most appropriate.  Using a sterile surgical marker, an appropriate bilobe flap drawn around the defect.    The area thus outlined was incised deep to adipose tissue with a #15 scalpel blade.  The skin margins were undermined to an appropriate distance in all directions utilizing iris scissors.
Trilobed Flap Text: The defect edges were debeveled with a #15 scalpel blade.  Given the location of the defect and the proximity to free margins a trilobed flap was deemed most appropriate.  Using a sterile surgical marker, an appropriate trilobed flap drawn around the defect.    The area thus outlined was incised deep to adipose tissue with a #15 scalpel blade.  The skin margins were undermined to an appropriate distance in all directions utilizing iris scissors.
Dorsal Nasal Flap Text: The defect edges were debeveled with a #15 scalpel blade.  Given the location of the defect and the proximity to free margins a dorsal nasal flap was deemed most appropriate.  Using a sterile surgical marker, an appropriate dorsal nasal flap was drawn around the defect.    The area thus outlined was incised deep to adipose tissue with a #15 scalpel blade.  The skin margins were undermined to an appropriate distance in all directions utilizing iris scissors.
Island Pedicle Flap Text: The defect edges were debeveled with a #15 scalpel blade.  Given the location of the defect, shape of the defect and the proximity to free margins an island pedicle advancement flap was deemed most appropriate.  Using a sterile surgical marker, an appropriate advancement flap was drawn incorporating the defect, outlining the appropriate donor tissue and placing the expected incisions within the relaxed skin tension lines where possible.    The area thus outlined was incised deep to adipose tissue with a #15 scalpel blade.  The skin margins were undermined to an appropriate distance in all directions around the primary defect and laterally outward around the island pedicle utilizing iris scissors.  There was minimal undermining beneath the pedicle flap.
Island Pedicle Flap With Canthal Suspension Text: The defect edges were debeveled with a #15 scalpel blade.  Given the location of the defect, shape of the defect and the proximity to free margins an island pedicle advancement flap was deemed most appropriate.  Using a sterile surgical marker, an appropriate advancement flap was drawn incorporating the defect, outlining the appropriate donor tissue and placing the expected incisions within the relaxed skin tension lines where possible. The area thus outlined was incised deep to adipose tissue with a #15 scalpel blade.  The skin margins were undermined to an appropriate distance in all directions around the primary defect and laterally outward around the island pedicle utilizing iris scissors.  There was minimal undermining beneath the pedicle flap. A suspension suture was placed in the canthal tendon to prevent tension and prevent ectropion.
Alar Island Pedicle Flap Text: The defect edges were debeveled with a #15 scalpel blade.  Given the location of the defect, shape of the defect and the proximity to the alar rim an island pedicle advancement flap was deemed most appropriate.  Using a sterile surgical marker, an appropriate advancement flap was drawn incorporating the defect, outlining the appropriate donor tissue and placing the expected incisions within the nasal ala running parallel to the alar rim. The area thus outlined was incised with a #15 scalpel blade.  The skin margins were undermined minimally to an appropriate distance in all directions around the primary defect and laterally outward around the island pedicle utilizing iris scissors.  There was minimal undermining beneath the pedicle flap.
Double Island Pedicle Flap Text: The defect edges were debeveled with a #15 scalpel blade.  Given the location of the defect, shape of the defect and the proximity to free margins a double island pedicle advancement flap was deemed most appropriate.  Using a sterile surgical marker, an appropriate advancement flap was drawn incorporating the defect, outlining the appropriate donor tissue and placing the expected incisions within the relaxed skin tension lines where possible.    The area thus outlined was incised deep to adipose tissue with a #15 scalpel blade.  The skin margins were undermined to an appropriate distance in all directions around the primary defect and laterally outward around the island pedicle utilizing iris scissors.  There was minimal undermining beneath the pedicle flap.
Island Pedicle Flap-Requiring Vessel Identification Text: The defect edges were debeveled with a #15 scalpel blade.  Given the location of the defect, shape of the defect and the proximity to free margins an island pedicle advancement flap was deemed most appropriate.  Using a sterile surgical marker, an appropriate advancement flap was drawn, based on the axial vessel mentioned above, incorporating the defect, outlining the appropriate donor tissue and placing the expected incisions within the relaxed skin tension lines where possible.    The area thus outlined was incised deep to adipose tissue with a #15 scalpel blade.  The skin margins were undermined to an appropriate distance in all directions around the primary defect and laterally outward around the island pedicle utilizing iris scissors.  There was minimal undermining beneath the pedicle flap.
Keystone Flap Text: The defect edges were debeveled with a #15 scalpel blade.  Given the location of the defect, shape of the defect a keystone flap was deemed most appropriate.  Using a sterile surgical marker, an appropriate keystone flap was drawn incorporating the defect, outlining the appropriate donor tissue and placing the expected incisions within the relaxed skin tension lines where possible. The area thus outlined was incised deep to adipose tissue with a #15 scalpel blade.  The skin margins were undermined to an appropriate distance in all directions around the primary defect and laterally outward around the flap utilizing iris scissors.
O-T Plasty Text: The defect edges were debeveled with a #15 scalpel blade.  Given the location of the defect, shape of the defect and the proximity to free margins an O-T plasty was deemed most appropriate.  Using a sterile surgical marker, an appropriate O-T plasty was drawn incorporating the defect and placing the expected incisions within the relaxed skin tension lines where possible.    The area thus outlined was incised deep to adipose tissue with a #15 scalpel blade.  The skin margins were undermined to an appropriate distance in all directions utilizing iris scissors.
O-Z Plasty Text: The defect edges were debeveled with a #15 scalpel blade.  Given the location of the defect, shape of the defect and the proximity to free margins an O-Z plasty (double transposition flap) was deemed most appropriate.  Using a sterile surgical marker, the appropriate transposition flaps were drawn incorporating the defect and placing the expected incisions within the relaxed skin tension lines where possible.    The area thus outlined was incised deep to adipose tissue with a #15 scalpel blade.  The skin margins were undermined to an appropriate distance in all directions utilizing iris scissors.  Hemostasis was achieved with electrocautery.  The flaps were then transposed into place, one clockwise and the other counterclockwise, and anchored with interrupted buried subcutaneous sutures.
Double O-Z Plasty Text: The defect edges were debeveled with a #15 scalpel blade.  Given the location of the defect, shape of the defect and the proximity to free margins a Double O-Z plasty (double transposition flap) was deemed most appropriate.  Using a sterile surgical marker, the appropriate transposition flaps were drawn incorporating the defect and placing the expected incisions within the relaxed skin tension lines where possible. The area thus outlined was incised deep to adipose tissue with a #15 scalpel blade.  The skin margins were undermined to an appropriate distance in all directions utilizing iris scissors.  Hemostasis was achieved with electrocautery.  The flaps were then transposed into place, one clockwise and the other counterclockwise, and anchored with interrupted buried subcutaneous sutures.
V-Y Plasty Text: The defect edges were debeveled with a #15 scalpel blade.  Given the location of the defect, shape of the defect and the proximity to free margins an V-Y advancement flap was deemed most appropriate.  Using a sterile surgical marker, an appropriate advancement flap was drawn incorporating the defect and placing the expected incisions within the relaxed skin tension lines where possible.    The area thus outlined was incised deep to adipose tissue with a #15 scalpel blade.  The skin margins were undermined to an appropriate distance in all directions utilizing iris scissors.
H Plasty Text: Given the location of the defect, shape of the defect and the proximity to free margins a H-plasty was deemed most appropriate for repair.  Using a sterile surgical marker, the appropriate advancement arms of the H-plasty were drawn incorporating the defect and placing the expected incisions within the relaxed skin tension lines where possible. The area thus outlined was incised deep to adipose tissue with a #15 scalpel blade. The skin margins were undermined to an appropriate distance in all directions utilizing iris scissors.  The opposing advancement arms were then advanced into place in opposite direction and anchored with interrupted buried subcutaneous sutures.
W Plasty Text: The lesion was extirpated to the level of the fat with a #15 scalpel blade.  Given the location of the defect, shape of the defect and the proximity to free margins a W-plasty was deemed most appropriate for repair.  Using a sterile surgical marker, the appropriate transposition arms of the W-plasty were drawn incorporating the defect and placing the expected incisions within the relaxed skin tension lines where possible.    The area thus outlined was incised deep to adipose tissue with a #15 scalpel blade.  The skin margins were undermined to an appropriate distance in all directions utilizing iris scissors.  The opposing transposition arms were then transposed into place in opposite direction and anchored with interrupted buried subcutaneous sutures.
Z Plasty Text: The lesion was extirpated to the level of the fat with a #15 scalpel blade.  Given the location of the defect, shape of the defect and the proximity to free margins a Z-plasty was deemed most appropriate for repair.  Using a sterile surgical marker, the appropriate transposition arms of the Z-plasty were drawn incorporating the defect and placing the expected incisions within the relaxed skin tension lines where possible.    The area thus outlined was incised deep to adipose tissue with a #15 scalpel blade.  The skin margins were undermined to an appropriate distance in all directions utilizing iris scissors.  The opposing transposition arms were then transposed into place in opposite direction and anchored with interrupted buried subcutaneous sutures.
Double Z Plasty Text: The lesion was extirpated to the level of the fat with a #15 scalpel blade. Given the location of the defect, shape of the defect and the proximity to free margins a double Z-plasty was deemed most appropriate for repair. Using a sterile surgical marker, the appropriate transposition arms of the double Z-plasty were drawn incorporating the defect and placing the expected incisions within the relaxed skin tension lines where possible. The area thus outlined was incised deep to adipose tissue with a #15 scalpel blade. The skin margins were undermined to an appropriate distance in all directions utilizing iris scissors. The opposing transposition arms were then transposed and carried over into place in opposite direction and anchored with interrupted buried subcutaneous sutures.
Zygomaticofacial Flap Text: Given the location of the defect, shape of the defect and the proximity to free margins a zygomaticofacial flap was deemed most appropriate for repair.  Using a sterile surgical marker, the appropriate flap was drawn incorporating the defect and placing the expected incisions within the relaxed skin tension lines where possible. The area thus outlined was incised deep to adipose tissue with a #15 scalpel blade with preservation of a vascular pedicle.  The skin margins were undermined to an appropriate distance in all directions utilizing iris scissors.  The flap was then placed into the defect and anchored with interrupted buried subcutaneous sutures.
Cheek Interpolation Flap Text: A decision was made to reconstruct the defect utilizing an interpolation axial flap and a staged reconstruction.  A telfa template was made of the defect.  This telfa template was then used to outline the Cheek Interpolation flap.  The donor area for the pedicle flap was then injected with anesthesia.  The flap was excised through the skin and subcutaneous tissue down to the layer of the underlying musculature.  The interpolation flap was carefully excised within this deep plane to maintain its blood supply.  The edges of the donor site were undermined.   The donor site was closed in a primary fashion.  The pedicle was then rotated into position and sutured.  Once the tube was sutured into place, adequate blood supply was confirmed with blanching and refill.  The pedicle was then wrapped with xeroform gauze and dressed appropriately with a telfa and gauze bandage to ensure continued blood supply and protect the attached pedicle.
Cheek-To-Nose Interpolation Flap Text: A decision was made to reconstruct the defect utilizing an interpolation axial flap and a staged reconstruction.  A telfa template was made of the defect.  This telfa template was then used to outline the Cheek-To-Nose Interpolation flap.  The donor area for the pedicle flap was then injected with anesthesia.  The flap was excised through the skin and subcutaneous tissue down to the layer of the underlying musculature.  The interpolation flap was carefully excised within this deep plane to maintain its blood supply.  The edges of the donor site were undermined.   The donor site was closed in a primary fashion.  The pedicle was then rotated into position and sutured.  Once the tube was sutured into place, adequate blood supply was confirmed with blanching and refill.  The pedicle was then wrapped with xeroform gauze and dressed appropriately with a telfa and gauze bandage to ensure continued blood supply and protect the attached pedicle.
Interpolation Flap Text: A decision was made to reconstruct the defect utilizing an interpolation axial flap and a staged reconstruction.  A telfa template was made of the defect.  This telfa template was then used to outline the interpolation flap.  The donor area for the pedicle flap was then injected with anesthesia.  The flap was excised through the skin and subcutaneous tissue down to the layer of the underlying musculature.  The interpolation flap was carefully excised within this deep plane to maintain its blood supply.  The edges of the donor site were undermined.   The donor site was closed in a primary fashion.  The pedicle was then rotated into position and sutured.  Once the tube was sutured into place, adequate blood supply was confirmed with blanching and refill.  The pedicle was then wrapped with xeroform gauze and dressed appropriately with a telfa and gauze bandage to ensure continued blood supply and protect the attached pedicle.
Melolabial Interpolation Flap Text: A decision was made to reconstruct the defect utilizing an interpolation axial flap and a staged reconstruction.  A telfa template was made of the defect.  This telfa template was then used to outline the melolabial interpolation flap.  The donor area for the pedicle flap was then injected with anesthesia.  The flap was excised through the skin and subcutaneous tissue down to the layer of the underlying musculature.  The pedicle flap was carefully excised within this deep plane to maintain its blood supply.  The edges of the donor site were undermined.   The donor site was closed in a primary fashion.  The pedicle was then rotated into position and sutured.  Once the tube was sutured into place, adequate blood supply was confirmed with blanching and refill.  The pedicle was then wrapped with xeroform gauze and dressed appropriately with a telfa and gauze bandage to ensure continued blood supply and protect the attached pedicle.
Mastoid Interpolation Flap Text: A decision was made to reconstruct the defect utilizing an interpolation axial flap and a staged reconstruction.  A telfa template was made of the defect.  This telfa template was then used to outline the mastoid interpolation flap.  The donor area for the pedicle flap was then injected with anesthesia.  The flap was excised through the skin and subcutaneous tissue down to the layer of the underlying musculature.  The pedicle flap was carefully excised within this deep plane to maintain its blood supply.  The edges of the donor site were undermined.   The donor site was closed in a primary fashion.  The pedicle was then rotated into position and sutured.  Once the tube was sutured into place, adequate blood supply was confirmed with blanching and refill.  The pedicle was then wrapped with xeroform gauze and dressed appropriately with a telfa and gauze bandage to ensure continued blood supply and protect the attached pedicle.
Posterior Auricular Interpolation Flap Text: A decision was made to reconstruct the defect utilizing an interpolation axial flap and a staged reconstruction.  A telfa template was made of the defect.  This telfa template was then used to outline the posterior auricular interpolation flap.  The donor area for the pedicle flap was then injected with anesthesia.  The flap was excised through the skin and subcutaneous tissue down to the layer of the underlying musculature.  The pedicle flap was carefully excised within this deep plane to maintain its blood supply.  The edges of the donor site were undermined.   The donor site was closed in a primary fashion.  The pedicle was then rotated into position and sutured.  Once the tube was sutured into place, adequate blood supply was confirmed with blanching and refill.  The pedicle was then wrapped with xeroform gauze and dressed appropriately with a telfa and gauze bandage to ensure continued blood supply and protect the attached pedicle.
Paramedian Forehead Flap Text: A decision was made to reconstruct the defect utilizing an interpolation axial flap and a staged reconstruction.  A telfa template was made of the defect.  This telfa template was then used to outline the paramedian forehead pedicle flap.  The donor area for the pedicle flap was then injected with anesthesia.  The flap was excised through the skin and subcutaneous tissue down to the layer of the underlying musculature.  The pedicle flap was carefully excised within this deep plane to maintain its blood supply.  The edges of the donor site were undermined.   The donor site was closed in a primary fashion.  The pedicle was then rotated into position and sutured.  Once the tube was sutured into place, adequate blood supply was confirmed with blanching and refill.  The pedicle was then wrapped with xeroform gauze and dressed appropriately with a telfa and gauze bandage to ensure continued blood supply and protect the attached pedicle.
Abbe Flap (Upper To Lower Lip) Text: The defect of the lower lip was assessed and measured.  Given the location and size of the defect, an Abbe flap was deemed most appropriate.  Using a sterile surgical marker, an appropriate Abbe flap was measured and drawn on the upper lip. Local anesthesia was then infiltrated.  A scalpel was then used to incise the upper lip through and through the skin, vermilion, muscle and mucosa, leaving the flap pedicled on the opposite side.  The flap was then rotated and transferred to the lower lip defect.  The flap was then sutured into place with a three layer technique, closing the orbicularis oris muscle layer with subcutaneous buried sutures, followed by a mucosal layer and an epidermal layer.
Abbe Flap (Lower To Upper Lip) Text: The defect of the upper lip was assessed and measured.  Given the location and size of the defect, an Abbe flap was deemed most appropriate.  Using a sterile surgical marker, an appropriate Abbe flap was measured and drawn on the lower lip. Local anesthesia was then infiltrated. A scalpel was then used to incise the upper lip through and through the skin, vermilion, muscle and mucosa, leaving the flap pedicled on the opposite side.  The flap was then rotated and transferred to the lower lip defect.  The flap was then sutured into place with a three layer technique, closing the orbicularis oris muscle layer with subcutaneous buried sutures, followed by a mucosal layer and an epidermal layer.
Estlander Flap (Upper To Lower Lip) Text: The defect of the lower lip was assessed and measured.  Given the location and size of the defect, an Estlander flap was deemed most appropriate.  Using a sterile surgical marker, an appropriate Estlander flap was measured and drawn on the upper lip. Local anesthesia was then infiltrated. A scalpel was then used to incise the lateral aspect of the flap, through skin, muscle and mucosa, leaving the flap pedicled medially.  The flap was then rotated and positioned to fill the lower lip defect.  The flap was then sutured into place with a three layer technique, closing the orbicularis oris muscle layer with subcutaneous buried sutures, followed by a mucosal layer and an epidermal layer.
Estlander Flap (Lower To Upper Lip) Text: The defect of the lower lip was assessed and measured.  Given the location and size of the defect, an Estlander flap was deemed most appropriate.  Using a sterile surgical marker, an appropriate Estlander flap was measured and drawn on the upper lip. Local anesthesia was then infiltrated. A scalpel was then used to incise the lateral aspect of the flap, through skin, muscle and mucosa, leaving the flap pedicled medially.  The flap was then rotated and positioned to fill the lower lip defect.  The flap was then sutured into place with a three layer technique, closing the orbicularis oris muscle layer with subcutaneous buried sutures, followed by a mucosal layer and an epidermal layer.
Cheiloplasty (Less Than 50%) Text: A decision was made to reconstruct the defect with a  cheiloplasty.  The defect was undermined extensively.  Additional orbicularis oris muscle was excised with a 15 blade scalpel.  The defect was converted into a full thickness wedge, of less than 50% of the vertical height of the lip, to facilite a better cosmetic result.  Small vessels were then tied off with 5-0 monocyrl. The orbicularis oris, superficial fascia, adipose and dermis were then reapproximated.  After the deeper layers were approximated the epidermis was reapproximated with particular care given to realign the vermilion border.
Cheiloplasty (Complex) Text: A decision was made to reconstruct the defect with a  cheiloplasty.  The defect was undermined extensively.  Additional orbicularis oris muscle was excised with a 15 blade scalpel.  The defect was converted into a full thickness wedge to facilite a better cosmetic result.  Small vessels were then tied off with 5-0 monocyrl. The orbicularis oris, superficial fascia, adipose and dermis were then reapproximated.  After the deeper layers were approximated the epidermis was reapproximated with particular care given to realign the vermilion border.
Ear Wedge Repair Text: A wedge excision was completed by carrying down an excision through the full thickness of the ear and cartilage with an inward facing Burow's triangle. The wound was then closed in a layered fashion.
Full Thickness Lip Wedge Repair (Flap) Text: Given the location of the defect and the proximity to free margins a full thickness wedge repair was deemed most appropriate.  Using a sterile surgical marker, the appropriate repair was drawn incorporating the defect and placing the expected incisions perpendicular to the vermilion border.  The vermilion border was also meticulously outlined to ensure appropriate reapproximation during the repair.  The area thus outlined was incised through and through with a #15 scalpel blade.  The muscularis and dermis were reaproximated with deep sutures following hemostasis. Care was taken to realign the vermilion border before proceeding with the superficial closure.  Once the vermilion was realigned the superfical and mucosal closure was finished.
Ftsg Text: The defect edges were debeveled with a #15 scalpel blade.  Given the location of the defect, shape of the defect and the proximity to free margins a full thickness skin graft was deemed most appropriate.  Using a sterile surgical marker, the primary defect shape was transferred to the donor site. The area thus outlined was incised deep to adipose tissue with a #15 scalpel blade.  The harvested graft was then trimmed of adipose tissue until only dermis and epidermis was left.  The skin margins of the secondary defect were undermined to an appropriate distance in all directions utilizing iris scissors.  The secondary defect was closed with interrupted buried subcutaneous sutures.  The skin edges were then re-apposed with running  sutures.  The skin graft was then placed in the primary defect and oriented appropriately.
Split-Thickness Skin Graft Text: The defect edges were debeveled with a #15 scalpel blade.  Given the location of the defect, shape of the defect and the proximity to free margins a split thickness skin graft was deemed most appropriate.  Using a sterile surgical marker, the primary defect shape was transferred to the donor site. The split thickness graft was then harvested.  The skin graft was then placed in the primary defect and oriented appropriately.
Pinch Graft Text: The defect edges were debeveled with a #15 scalpel blade. Given the location of the defect, shape of the defect and the proximity to free margins a pinch graft was deemed most appropriate. Using a sterile surgical marker, the primary defect shape was transferred to the donor site. The area thus outlined was incised deep to adipose tissue with a #15 scalpel blade.  The harvested graft was then trimmed of adipose tissue until only dermis and epidermis was left. The skin margins of the secondary defect were undermined to an appropriate distance in all directions utilizing iris scissors.  The secondary defect was closed with interrupted buried subcutaneous sutures.  The skin edges were then re-apposed with running  sutures.  The skin graft was then placed in the primary defect and oriented appropriately.
Burow's Graft Text: The defect edges were debeveled with a #15 scalpel blade.  Given the location of the defect, shape of the defect, the proximity to free margins and the presence of a standing cone deformity a Burow's skin graft was deemed most appropriate. The standing cone was removed and this tissue was then trimmed to the shape of the primary defect. The adipose tissue was also removed until only dermis and epidermis were left.  The skin margins of the secondary defect were undermined to an appropriate distance in all directions utilizing iris scissors.  The secondary defect was closed with interrupted buried subcutaneous sutures.  The skin edges were then re-apposed with running  sutures.  The skin graft was then placed in the primary defect and oriented appropriately.
Cartilage Graft Text: The defect edges were debeveled with a #15 scalpel blade.  Given the location of the defect, shape of the defect, the fact the defect involved a full thickness cartilage defect a cartilage graft was deemed most appropriate.  An appropriate donor site was identified, cleansed, and anesthetized. The cartilage graft was then harvested and transferred to the recipient site, oriented appropriately and then sutured into place.  The secondary defect was then repaired using a primary closure.
Composite Graft Text: The defect edges were debeveled with a #15 scalpel blade.  Given the location of the defect, shape of the defect, the proximity to free margins and the fact the defect was full thickness a composite graft was deemed most appropriate.  The defect was outline and then transferred to the donor site.  A full thickness graft was then excised from the donor site. The graft was then placed in the primary defect, oriented appropriately and then sutured into place.  The secondary defect was then repaired using a primary closure.
Epidermal Autograft Text: The defect edges were debeveled with a #15 scalpel blade.  Given the location of the defect, shape of the defect and the proximity to free margins an epidermal autograft was deemed most appropriate.  Using a sterile surgical marker, the primary defect shape was transferred to the donor site. The epidermal graft was then harvested.  The skin graft was then placed in the primary defect and oriented appropriately.
Dermal Autograft Text: The defect edges were debeveled with a #15 scalpel blade.  Given the location of the defect, shape of the defect and the proximity to free margins a dermal autograft was deemed most appropriate.  Using a sterile surgical marker, the primary defect shape was transferred to the donor site. The area thus outlined was incised deep to adipose tissue with a #15 scalpel blade.  The harvested graft was then trimmed of adipose and epidermal tissue until only dermis was left.  The skin graft was then placed in the primary defect and oriented appropriately.
Skin Substitute Text: The defect edges were debeveled with a #15 scalpel blade.  Given the location of the defect, shape of the defect and the proximity to free margins a skin substitute graft was deemed most appropriate.  The graft material was trimmed to fit the size of the defect. The graft was then placed in the primary defect and oriented appropriately.
Tissue Cultured Epidermal Autograft Text: The defect edges were debeveled with a #15 scalpel blade.  Given the location of the defect, shape of the defect and the proximity to free margins a tissue cultured epidermal autograft was deemed most appropriate.  The graft was then trimmed to fit the size of the defect.  The graft was then placed in the primary defect and oriented appropriately.
Xenograft Text: The defect edges were debeveled with a #15 scalpel blade.  Given the location of the defect, shape of the defect and the proximity to free margins a xenograft was deemed most appropriate.  The graft was then trimmed to fit the size of the defect.  The graft was then placed in the primary defect and oriented appropriately.
Purse String (Simple) Text: Given the location of the defect and the characteristics of the surrounding skin a purse string closure was deemed most appropriate.  Undermining was performed circumfirentially around the surgical defect.  A purse string suture was then placed and tightened.
Purse String (Intermediate) Text: Given the location of the defect and the characteristics of the surrounding skin a purse string intermediate closure was deemed most appropriate.  Undermining was performed circumfirentially around the surgical defect.  A purse string suture was then placed and tightened.
Partial Purse String (Simple) Text: Given the location of the defect and the characteristics of the surrounding skin a simple purse string closure was deemed most appropriate.  Undermining was performed circumfirentially around the surgical defect.  A purse string suture was then placed and tightened. Wound tension only allowed a partial closure of the circular defect.
Partial Purse String (Intermediate) Text: Given the location of the defect and the characteristics of the surrounding skin an intermediate purse string closure was deemed most appropriate.  Undermining was performed circumfirentially around the surgical defect.  A purse string suture was then placed and tightened. Wound tension only allowed a partial closure of the circular defect.
Localized Dermabrasion Text: The patient was draped in routine manner.  Localized dermabrasion using 3 x 17 mm wire brush was performed in routine manner to papillary dermis. This spot dermabrasion is being performed to complete skin cancer reconstruction. It also will eliminate the other sun damaged precancerous cells that are known to be part of the regional effect of a lifetime's worth of sun exposure. This localized dermabrasion is therapeutic and should not be considered cosmetic in any regard.
Tarsorrhaphy Text: A tarsorrhaphy was performed using Frost sutures.
Intermediate Repair And Flap Additional Text (Will Appearing After The Standard Complex Repair Text): The intermediate repair was not sufficient to completely close the primary defect. The remaining additional defect was repaired with the flap mentioned below.
Intermediate Repair And Graft Additional Text (Will Appearing After The Standard Complex Repair Text): The intermediate repair was not sufficient to completely close the primary defect. The remaining additional defect was repaired with the graft mentioned below.
Complex Repair And Flap Additional Text (Will Appearing After The Standard Complex Repair Text): The complex repair was not sufficient to completely close the primary defect. The remaining additional defect was repaired with the flap mentioned below.
Complex Repair And Graft Additional Text (Will Appearing After The Standard Complex Repair Text): The complex repair was not sufficient to completely close the primary defect. The remaining additional defect was repaired with the graft mentioned below.
Manual Repair Warning Statement: We plan on removing the manually selected variable below in favor of our much easier automatic structured text blocks found in the previous tab. We decided to do this to help make the flow better and give you the full power of structured data. Manual selection is never going to be ideal in our platform and I would encourage you to avoid using manual selection from this point on, especially since I will be sunsetting this feature. It is important that you do one of two things with the customized text below. First, you can save all of the text in a word file so you can have it for future reference. Second, transfer the text to the appropriate area in the Library tab. Lastly, if there is a flap or graft type which we do not have you need to let us know right away so I can add it in before the variable is hidden. No need to panic, we plan to give you roughly 6 months to make the change.
Same Histology In Subsequent Stages Text: The pattern and morphology of the tumor is the same as noted in the previous stage.
No Residual Tumor Seen Histology Text: There were no malignant cells seen in the sections examined.
Inflammation Suggestive Of Cancer Camouflage Histology Text: There was a dense lymphocytic infiltrate which prevented adequate histologic evaluation of adjacent structures. An additional layer is indicated to rule out definitive tumor involvement at the surgical margin.
Incidental Actinic Keratosis Histology Text: Incidentally, partial thickness keratinocytic atypia was noted at the surgical margin on this section. Given lack of full-thickness atypia, we will cease Mohs surgery and plan for topical therapy once the wound is healed.
Missing Epidermis Histology Text: After multiple sections through the Mohs tissue block, the full peripheral epidermal margin was unable to be fully evaluated. An additional section was obtained to evaluate the epidermal margin.
Bcc Histology Text: Numerous aggregates of atypical basaloid cells with clefting, peripheral palisading and myxoid stroma
Bcc Infiltrative Histology Text: There were numerous aggregates of basaloid cells demonstrating an infiltrative pattern.
Bcc Superficial Histology Text: Atypical basaloid aggregates emanating from the epidermis with palisading and clefting
Scc Histology Text: Dermal nodules and aggregates of atypical squamous cells
Scc Well Differentiated Histology Text: Atypical aggregates of keratinizing squamous cells extending into the dermis
Scc In Situ Histology Text: Full thickness epidermal atypia with parakeratosis and loss of the granular layer
Scc In Situ With Follicular Extension Histology Text: Atypia of the infundibular epithelium noted
Mart-1 - Positive Histology Text: MART-1 staining demonstrates areas of higher density and clustering of melanocytes with Pagetoid spread upwards within the epidermis. The surgical margins are positive for tumor cells.
Mart-1 - Negative Histology Text: MART-1 staining demonstrates a normal density and pattern of melanocytes along the dermal-epidermal junction. The surgical margins are negative for tumor cells.
Information: Selecting Yes will display possible errors in your note based on the variables you have selected. This validation is only offered as a suggestion for you. PLEASE NOTE THAT THE VALIDATION TEXT WILL BE REMOVED WHEN YOU FINALIZE YOUR NOTE. IF YOU WANT TO FAX A PRELIMINARY NOTE YOU WILL NEED TO TOGGLE THIS TO 'NO' IF YOU DO NOT WANT IT IN YOUR FAXED NOTE.

## 2023-11-27 ENCOUNTER — APPOINTMENT (RX ONLY)
Dept: URBAN - METROPOLITAN AREA CLINIC 166 | Facility: CLINIC | Age: 88
Setting detail: DERMATOLOGY
End: 2023-11-27

## 2023-11-27 DIAGNOSIS — L57.8 OTHER SKIN CHANGES DUE TO CHRONIC EXPOSURE TO NONIONIZING RADIATION: ICD-10-CM

## 2023-11-27 DIAGNOSIS — L57.0 ACTINIC KERATOSIS: ICD-10-CM

## 2023-11-27 PROCEDURE — 17004 DESTROY PREMAL LESIONS 15/>: CPT | Mod: 79

## 2023-11-27 PROCEDURE — 99213 OFFICE O/P EST LOW 20 MIN: CPT | Mod: 24,25

## 2023-11-27 PROCEDURE — ? COUNSELING

## 2023-11-27 PROCEDURE — ? LIQUID NITROGEN

## 2023-11-27 PROCEDURE — ? TREATMENT REGIMEN

## 2023-11-27 ASSESSMENT — LOCATION DETAILED DESCRIPTION DERM
LOCATION DETAILED: LEFT MEDIAL FRONTAL SCALP
LOCATION DETAILED: LEFT SUPERIOR PARIETAL SCALP
LOCATION DETAILED: LEFT CENTRAL FRONTAL SCALP
LOCATION DETAILED: LEFT SUPERIOR FOREHEAD
LOCATION DETAILED: LEFT CENTRAL PARIETAL SCALP
LOCATION DETAILED: RIGHT CENTRAL FRONTAL SCALP
LOCATION DETAILED: RIGHT SUPERIOR OCCIPITAL SCALP
LOCATION DETAILED: MID-FRONTAL SCALP
LOCATION DETAILED: RIGHT SUPERIOR POSTERIOR PARIETAL SCALP
LOCATION DETAILED: MID-OCCIPITAL SCALP
LOCATION DETAILED: RIGHT SUPERIOR FOREHEAD

## 2023-11-27 ASSESSMENT — LOCATION ZONE DERM
LOCATION ZONE: SCALP
LOCATION ZONE: FACE

## 2023-11-27 ASSESSMENT — LOCATION SIMPLE DESCRIPTION DERM
LOCATION SIMPLE: RIGHT FOREHEAD
LOCATION SIMPLE: ANTERIOR SCALP
LOCATION SIMPLE: SCALP
LOCATION SIMPLE: RIGHT SCALP
LOCATION SIMPLE: LEFT SCALP
LOCATION SIMPLE: POSTERIOR SCALP
LOCATION SIMPLE: LEFT FOREHEAD

## 2023-11-27 NOTE — PROCEDURE: LIQUID NITROGEN
Render Note In Bullet Format When Appropriate: No
Consent: The patient's consent was obtained including but not limited to risks of crusting, scabbing, blistering, scarring, darker or lighter pigmentary change, recurrence, incomplete removal and infection.
Show Applicator Variable?: Yes
Detail Level: Detailed
Number Of Freeze-Thaw Cycles: 2 freeze-thaw cycles
Duration Of Freeze Thaw-Cycle (Seconds): 6
Post-Care Instructions: I reviewed with the patient in detail post-care instructions. Patient is to wear sunprotection, and avoid picking at any of the treated lesions. Pt may apply Vaseline to crusted or scabbing areas.

## 2023-11-27 NOTE — PROCEDURE: TREATMENT REGIMEN
Detail Level: Zone
Initiate Treatment: Efudex 5% cream apply nightly to scalp x 2 weeks, leave to heal x 2 weeks, then apply for another 2 weeks

## 2023-11-27 NOTE — PROCEDURE: COUNSELING
Sunscreen Recommendations: continue spf 30 + daily, hats, long sleeves
Nicotinamide Supplementation Recommendations: Continue 500 mg nicotinamide BID
Detail Level: Detailed

## 2024-03-11 DIAGNOSIS — I10 ESSENTIAL HYPERTENSION, BENIGN: ICD-10-CM

## 2024-03-11 DIAGNOSIS — R60.9 EDEMA, UNSPECIFIED TYPE: ICD-10-CM

## 2024-03-11 RX ORDER — IRBESARTAN AND HYDROCHLOROTHIAZIDE 150; 12.5 MG/1; MG/1
1 TABLET, FILM COATED ORAL DAILY
Qty: 90 TABLET | Refills: 3 | OUTPATIENT
Start: 2024-03-11

## 2024-03-14 NOTE — TELEPHONE ENCOUNTER
Left detailed message to call clinic and schedule a follow up and medicare visit with pcp as it has been over a year since seen. If pt schedules appointment and will run out of medication a haley fill of medication could be approved.

## 2024-03-25 DIAGNOSIS — Z13.6 CARDIOVASCULAR SCREENING; LDL GOAL LESS THAN 100: ICD-10-CM

## 2024-03-25 RX ORDER — ATORVASTATIN CALCIUM 20 MG/1
20 TABLET, FILM COATED ORAL DAILY
Qty: 90 TABLET | Refills: 3 | OUTPATIENT
Start: 2024-03-25

## 2024-03-25 NOTE — TELEPHONE ENCOUNTER
Left detailed message for pt to return clinic call to schedule a medicare/medication follow up with PCP.

## 2024-03-28 DIAGNOSIS — Z13.6 CARDIOVASCULAR SCREENING; LDL GOAL LESS THAN 100: ICD-10-CM

## 2024-03-28 RX ORDER — ATORVASTATIN CALCIUM 20 MG/1
20 TABLET, FILM COATED ORAL DAILY
Qty: 90 TABLET | Refills: 3 | Status: SHIPPED | OUTPATIENT
Start: 2024-03-28

## 2024-03-30 DIAGNOSIS — I10 ESSENTIAL HYPERTENSION, BENIGN: ICD-10-CM

## 2024-03-30 DIAGNOSIS — R60.9 EDEMA, UNSPECIFIED TYPE: ICD-10-CM

## 2024-04-01 RX ORDER — IRBESARTAN AND HYDROCHLOROTHIAZIDE 150; 12.5 MG/1; MG/1
1 TABLET, FILM COATED ORAL DAILY
Qty: 90 TABLET | Refills: 3 | Status: SHIPPED | OUTPATIENT
Start: 2024-04-01

## 2024-04-02 ENCOUNTER — DOCUMENTATION ONLY (OUTPATIENT)
Dept: NEUROLOGY | Facility: CLINIC | Age: 89
End: 2024-04-02
Payer: MEDICARE

## 2024-04-02 NOTE — PROGRESS NOTES
Received Fax request to release all neurology records to Neurologist in [place], Dr Noah Hamilton. Per Written notes on fax this is the 3rd attempt to get notes. I faxed the paperwork to release of information at and scanned to PT file.    Faxed  Records Request to [facility] Release of Information Dept  April 2, 2024 to fax number [phone]      Right Fax confirmed at 10:54:17 AM    Lay Reyes

## 2024-04-30 ENCOUNTER — DOCUMENTATION ONLY (OUTPATIENT)
Dept: NEUROLOGY | Facility: CLINIC | Age: 89
End: 2024-04-30
Payer: MEDICARE

## 2024-04-30 NOTE — PROGRESS NOTES
Patient and spouse just came back to [place] from wintering in [place] at the airport their handicapped parking pass was dropped and despite back tracking immediately it was gone. They would like assistance completing paperwork to get a replacement.   Brought out paperwork and had the patient fill out their portion, discussed with patient and spouse when it is complete and signed I will fax to  [employer] then scan to an email and send them original to wife's email [email or web address]    Faxed Disability Parking Pass Application to  [employer]  May 1, 2024 to fax number [phone]    Right Fax confirmed at 5:37:13 PM    Lay Reyes

## 2024-05-10 ENCOUNTER — TELEPHONE (OUTPATIENT)
Dept: NEUROLOGY | Facility: CLINIC | Age: 89
End: 2024-05-10
Payer: MEDICARE

## 2024-05-10 NOTE — TELEPHONE ENCOUNTER
[facility] Call Center    Phone Message    May a detailed message be left on voicemail: yes     Reason for Call: Patient called to speak to care team regarding his handicap parking forms, patient would like to know what the status is for this?     Action Taken: Other: cs neurology    Travel Screening: Not Applicable

## 2024-05-14 NOTE — TELEPHONE ENCOUNTER
Patient has been advised to follow up with [employer] and let me know if any further paperwork is required

## 2024-05-30 ENCOUNTER — TELEPHONE (OUTPATIENT)
Dept: NEUROLOGY | Facility: CLINIC | Age: 89
End: 2024-05-30
Payer: MEDICARE

## 2024-05-30 NOTE — TELEPHONE ENCOUNTER
Left message for patient:  Offered to reschedule appointment to May 31 at 9:30. Patient is on the call back list.

## 2024-06-19 ENCOUNTER — OFFICE VISIT (OUTPATIENT)
Dept: NEUROLOGY | Facility: CLINIC | Age: 89
End: 2024-06-19
Payer: MEDICARE

## 2024-06-19 VITALS — SYSTOLIC BLOOD PRESSURE: 91 MMHG | DIASTOLIC BLOOD PRESSURE: 59 MMHG | HEART RATE: 65 BPM | OXYGEN SATURATION: 99 %

## 2024-06-19 DIAGNOSIS — G31.84 MILD COGNITIVE IMPAIRMENT: ICD-10-CM

## 2024-06-19 DIAGNOSIS — G21.4 VASCULAR PARKINSONISM (H): Primary | ICD-10-CM

## 2024-06-19 PROCEDURE — 99215 OFFICE O/P EST HI 40 MIN: CPT | Performed by: PSYCHIATRY & NEUROLOGY

## 2024-06-19 ASSESSMENT — MONTREAL COGNITIVE ASSESSMENT (MOCA)
11. FOR EACH PAIR OF WORDS, WHAT CATEGORY DO THEY BELONG TO (OUT OF 2): 2
4. NAME EACH OF THE THREE ANIMALS SHOWN: 3
VISUOSPATIAL/EXECUTIVE SUBSCORE: 2
7. [VIGILENCE] TAP WHEN HEARING DESIGNATED LETTER: 1
13. ORIENTATION SUBSCORE: 5
10. [FLUENCY] NAME WORDS STARTING WITH DESIGNATED LETTER: 1
8. SERIAL SUBTRACTION OF 7S: 3
9. REPEAT EACH SENTENCE: 2
WHAT IS THE TOTAL SCORE (OUT OF 30): 24
6. READ LIST OF DIGITS [FORWARD/BACKWARD]: 2
12. MEMORY INDEX SCORE: 3
WHAT LEVEL OF EDUCATION WAS ATTAINED: 0

## 2024-06-19 NOTE — PROGRESS NOTES
ESTABLISHED PATIENT NEUROLOGY NOTE    DATE OF VISIT: 6/19/2024  CLINIC LOCATION: [facility]  MRN: [id number]  PATIENT NAME: Leonard Garza  YOB: 1935    REASON FOR VISIT:   Chief Complaint   Patient presents with    Follow Up     Memory- was seen in [place] over the winter      SUBJECTIVE:                                                      HISTORY OF PRESENT ILLNESS: Patient is here to follow up regarding mild cognitive impairment and vascular parkinsonism.  The last visit was on 7/18/2023.  Please refer to my initial/other prior notes for further information.  He is accompanied by his wife.    Since the last visit, the patient reports that his parkinsonism symptoms are stable, but memory is slightly worsening.  Wife feels that there is more noticeable cognitive worsening.  The patient withdrew from household chores and interactions with family.  He is more forgetful.  Movements are somewhat slower.  She feels that his mood is down because he misses his job.  They are selling their house in [place] and will be moving to [place], but returning back to [place] for a couple of months every year.    The patient denies interval development of new neurological symptoms.  While in [place], he was seen in movement disorder clinic.  Synuclein skin test was negative.  It felt that the clinical presentation would be consistent with vascular parkinsonism.  PET scan and repeat neuropsychologic testing were discussed, but not pursued by the patient.  EXAM:                                                    Physical Exam:   Vitals: BP 91/59 (BP Location: Right arm, Patient Position: Sitting, Cuff Size: Adult Regular)   Pulse 65   SpO2 99%   Success Cognitive Assessment:    Success Cognitive Assessment (MOCA)  Visuospatial/Executive : 2  Naming: 3  Attention - Digits: 2  Attention - Letters: 1  Attention - Subtraction: 3  Language - Repeat: 2  Language - Fluency : 1  Abstraction: 2  Delayed  Recall: 3  Orientation: 5  Education: 0  MOCA Score: 24  Administered by: : Lay GALDAMEZ     [place] Cognitive Assessment Score:  MOCA Score: 24/30.   General: pt is in NAD, cooperative.  Skin: normal turgor, moist mucous membranes, no lesions/rashes noticed.  HEENT: ATNC, white sclera, normal conjunctiva.  Respiratory: Symmetric lung excursion, no accessory respiratory muscle use.  Abdomen: Non distended.  Neurological: awake, cooperative, follows commands, mild hypophonia/hypomimia, hearing is reduced, tone increases with provoking maneuvers (left greater than right), no tremor, mildly stooped forward posture and slightly decreased stride length with reduced arm swings.  ASSESSMENT AND PLAN:                                                    Assessment: 89 year old male patient presents for follow-up of vascular parkinsonism and mild cognitive impairment.  MoCA today is 24/30 (stable), compared to a range of 23-24/30.  He tried Sinemet, but was not able to tolerate it due to nausea.  Amantadine and dopamine agonists could be considered in the future.  At the same time, I do not believe that PET scan or neuropsychologic testing would be fruitful at this time.  Would continue to monitor symptoms.    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  We discussed that therapy and possibly medications could be considered locally since they are moving to [place].    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 Mild cognitive impairment  G31.84         Plan: At today's visit we thoroughly discussed current symptoms, available treatment options, and the plan.    We decided to continue monitoring his symptoms without additional testing.    Regarding mood, he could consider seeing therapist in [place] (he will discuss with local providers).    Next follow-up appointment is in the next 1 year or earlier if needed.    Total Time: 42  minutes spent on the date of the encounter doing chart review, history and exam, documentation and further activities per the note.  Additional time was needed to review patient's symptoms, [place] neurologist's notes, discuss my impression and the plan while answering questions that the patient and his wife had.    Vasu Georges MD  [facility] Neurology  (Chart documentation was completed in part with Dragon voice-recognition software. Even though reviewed, some grammatical, spelling, and word errors may remain.)

## 2024-06-19 NOTE — PATIENT INSTRUCTIONS
AFTER VISIT SUMMARY (AVS):    At today's visit we thoroughly discussed current symptoms, available treatment options, and the plan.    We decided to continue monitoring your symptoms without additional testing.    Regarding your mood, you could consider seeing therapist in [place].    Next follow-up appointment is in the next 1 year or earlier if needed.    Please do not hesitate to call me with any questions or concerns.    Thanks.

## 2024-06-19 NOTE — LETTER
6/19/2024      Leonard Garza  [address] [address]      Dear Colleague,    Thank you for referring your patient, Leonard Garza, to the [facility] NEUROLOGY [facility]. Please see a copy of my visit note below.    ESTABLISHED PATIENT NEUROLOGY NOTE    DATE OF VISIT: 6/19/2024  CLINIC LOCATION: [facility]  MRN: [id number]  PATIENT NAME: Leonard Garza  YOB: 1935    REASON FOR VISIT:   Chief Complaint   Patient presents with     Follow Up     Memory- was seen in [place] over the winter      SUBJECTIVE:                                                      HISTORY OF PRESENT ILLNESS: Patient is here to follow up regarding mild cognitive impairment and vascular parkinsonism.  The last visit was on 7/18/2023.  Please refer to my initial/other prior notes for further information.  He is accompanied by his wife.    Since the last visit, the patient reports that his parkinsonism symptoms are stable, but memory is slightly worsening.  Wife feels that there is more noticeable cognitive worsening.  The patient withdrew from household chores and interactions with family.  He is more forgetful.  Movements are somewhat slower.  She feels that his mood is down because he misses his job.  They are selling their house in [place] and will be moving to [place], but returning back to [place] for a couple of months every year.    The patient denies interval development of new neurological symptoms.  While in [place], he was seen in movement disorder clinic.  Synuclein skin test was negative.  It felt that the clinical presentation would be consistent with vascular parkinsonism.  PET scan and repeat neuropsychologic testing were discussed, but not pursued by the patient.  EXAM:                                                    Physical Exam:   Vitals: BP 91/59 (BP Location: Right arm, Patient Position: Sitting, Cuff Size: Adult Regular)   Pulse 65   SpO2 99%    [place] Cognitive Assessment:    [place] Cognitive Assessment (MOCA)  Visuospatial/Executive : 2  Naming: 3  Attention - Digits: 2  Attention - Letters: 1  Attention - Subtraction: 3  Language - Repeat: 2  Language - Fluency : 1  Abstraction: 2  Delayed Recall: 3  Orientation: 5  Education: 0  MOCA Score: 24  Administered by: : Lay GALDAMEZ     [place] Cognitive Assessment Score:  MOCA Score: 24/30.   General: pt is in NAD, cooperative.  Skin: normal turgor, moist mucous membranes, no lesions/rashes noticed.  HEENT: ATNC, white sclera, normal conjunctiva.  Respiratory: Symmetric lung excursion, no accessory respiratory muscle use.  Abdomen: Non distended.  Neurological: awake, cooperative, follows commands, mild hypophonia/hypomimia, hearing is reduced, tone increases with provoking maneuvers (left greater than right), no tremor, mildly stooped forward posture and slightly decreased stride length with reduced arm swings.  ASSESSMENT AND PLAN:                                                    Assessment: 89 year old male patient presents for follow-up of vascular parkinsonism and mild cognitive impairment.  MoCA today is 24/30 (stable), compared to a range of 23-24/30.  He tried Sinemet, but was not able to tolerate it due to nausea.  Amantadine and dopamine agonists could be considered in the future.  At the same time, I do not believe that PET scan or neuropsychologic testing would be fruitful at this time.  Would continue to monitor symptoms.    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  We discussed that therapy and possibly medications could be considered locally since they are moving to [place].    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 Mild cognitive impairment  G31.84         Plan: At today's visit we thoroughly discussed current symptoms, available treatment options, and the plan.    We decided to  "continue monitoring his symptoms without additional testing.    Regarding mood, he could consider seeing therapist in [place] (he will discuss with local providers).    Next follow-up appointment is in the next 1 year or earlier if needed.    Total Time: 42 minutes spent on the date of the encounter doing chart review, history and exam, documentation and further activities per the note.  Additional time was needed to review patient's symptoms, [place] neurologist's notes, discuss my impression and the plan while answering questions that the patient and his wife had.    Vasu Georges MD  [facility] Neurology  (Chart documentation was completed in part with Dragon voice-recognition software. Even though reviewed, some grammatical, spelling, and word errors may remain.)    Leonard Garza is a 89 year old male who presents for:  Chief Complaint   Patient presents with     Follow Up     Memory- was seen in [place] over the winter         Initial Vitals:  BP 91/59 (BP Location: Right arm, Patient Position: Sitting, Cuff Size: Adult Regular)   Pulse 65   SpO2 99%  Estimated body mass index is 28.43 kg/m  as calculated from the following:    Height as of 8/22/22: 1.727 m (5' 8\").    Weight as of 8/22/22: 84.8 kg (187 lb).. There is no height or weight on file to calculate BSA. BP completed using cuff size: regular    MoCA 8.2=  /30     Lay Reyes       Again, thank you for allowing me to participate in the care of your patient.        Sincerely,        Vasu Georges MD  "

## 2024-06-19 NOTE — PROGRESS NOTES
"Leonard Garza is a 89 year old male who presents for:  Chief Complaint   Patient presents with    Follow Up     Memory- was seen in [place] over the winter         Initial Vitals:  BP 91/59 (BP Location: Right arm, Patient Position: Sitting, Cuff Size: Adult Regular)   Pulse 65   SpO2 99%  Estimated body mass index is 28.43 kg/m  as calculated from the following:    Height as of 8/22/22: 1.727 m (5' 8\").    Weight as of 8/22/22: 84.8 kg (187 lb).. There is no height or weight on file to calculate BSA. BP completed using cuff size: regular    MoCA 8.2=  24/30     Lay Reyes   "

## 2024-09-10 ENCOUNTER — APPOINTMENT (RX ONLY)
Dept: URBAN - METROPOLITAN AREA CLINIC 170 | Facility: CLINIC | Age: 89
Setting detail: DERMATOLOGY
End: 2024-09-10

## 2024-09-10 DIAGNOSIS — L81.4 OTHER MELANIN HYPERPIGMENTATION: ICD-10-CM

## 2024-09-10 DIAGNOSIS — L82.1 OTHER SEBORRHEIC KERATOSIS: ICD-10-CM

## 2024-09-10 DIAGNOSIS — Z85.828 PERSONAL HISTORY OF OTHER MALIGNANT NEOPLASM OF SKIN: ICD-10-CM

## 2024-09-10 DIAGNOSIS — D49.2 NEOPLASM OF UNSPECIFIED BEHAVIOR OF BONE, SOFT TISSUE, AND SKIN: ICD-10-CM

## 2024-09-10 DIAGNOSIS — D22 MELANOCYTIC NEVI: ICD-10-CM

## 2024-09-10 DIAGNOSIS — D18.0 HEMANGIOMA: ICD-10-CM

## 2024-09-10 DIAGNOSIS — L57.8 OTHER SKIN CHANGES DUE TO CHRONIC EXPOSURE TO NONIONIZING RADIATION: ICD-10-CM

## 2024-09-10 DIAGNOSIS — Z71.89 OTHER SPECIFIED COUNSELING: ICD-10-CM

## 2024-09-10 DIAGNOSIS — L57.0 ACTINIC KERATOSIS: ICD-10-CM

## 2024-09-10 PROBLEM — D18.01 HEMANGIOMA OF SKIN AND SUBCUTANEOUS TISSUE: Status: ACTIVE | Noted: 2024-09-10

## 2024-09-10 PROBLEM — D22.4 MELANOCYTIC NEVI OF SCALP AND NECK: Status: ACTIVE | Noted: 2024-09-10

## 2024-09-10 PROCEDURE — ? DEFER

## 2024-09-10 PROCEDURE — ? BIOPSY BY SHAVE METHOD

## 2024-09-10 PROCEDURE — ? TREATMENT REGIMEN

## 2024-09-10 PROCEDURE — ? LIQUID NITROGEN

## 2024-09-10 PROCEDURE — 69100 BIOPSY OF EXTERNAL EAR: CPT

## 2024-09-10 PROCEDURE — ? COUNSELING

## 2024-09-10 PROCEDURE — 17004 DESTROY PREMAL LESIONS 15/>: CPT

## 2024-09-10 PROCEDURE — 99213 OFFICE O/P EST LOW 20 MIN: CPT | Mod: 25

## 2024-09-10 PROCEDURE — 11102 TANGNTL BX SKIN SINGLE LES: CPT | Mod: 59

## 2024-09-10 PROCEDURE — ? OTHER

## 2024-09-10 PROCEDURE — ? PRESCRIPTION

## 2024-09-10 RX ORDER — TRIAMCINOLONE ACETONIDE 1 MG/G
OINTMENT TOPICAL
Qty: 30 | Refills: 1 | Status: ERX | COMMUNITY
Start: 2024-09-10

## 2024-09-10 RX ORDER — FLUOROURACIL 5 MG/G
CREAM TOPICAL
Qty: 40 | Refills: 0 | Status: ERX | COMMUNITY
Start: 2024-09-10

## 2024-09-10 RX ADMIN — TRIAMCINOLONE ACETONIDE: 1 OINTMENT TOPICAL at 00:00

## 2024-09-10 RX ADMIN — FLUOROURACIL: 5 CREAM TOPICAL at 00:00

## 2024-09-10 ASSESSMENT — LOCATION DETAILED DESCRIPTION DERM
LOCATION DETAILED: LEFT INFERIOR LATERAL MALAR CHEEK
LOCATION DETAILED: RIGHT FOREHEAD
LOCATION DETAILED: LEFT SUPERIOR VERMILION LIP
LOCATION DETAILED: RIGHT TRAGUS
LOCATION DETAILED: SUPERIOR THORACIC SPINE
LOCATION DETAILED: RIGHT ANTIHELIX
LOCATION DETAILED: EPIGASTRIC SKIN
LOCATION DETAILED: GLABELLA
LOCATION DETAILED: LEFT ANTERIOR EARLOBE
LOCATION DETAILED: RIGHT INFERIOR TEMPLE
LOCATION DETAILED: RIGHT MEDIAL MALAR CHEEK
LOCATION DETAILED: LEFT SUPERIOR HELIX
LOCATION DETAILED: LEFT SUPERIOR FOREHEAD
LOCATION DETAILED: LEFT MEDIAL FRONTAL SCALP
LOCATION DETAILED: RIGHT SUPERIOR VERMILION LIP
LOCATION DETAILED: RIGHT SUPERIOR MEDIAL UPPER BACK
LOCATION DETAILED: LEFT SUPERIOR MEDIAL FOREHEAD
LOCATION DETAILED: MID TRAPEZIAL NECK
LOCATION DETAILED: LEFT SUPERIOR PARIETAL SCALP
LOCATION DETAILED: RIGHT POSTERIOR SHOULDER
LOCATION DETAILED: LEFT ANTITRAGUS
LOCATION DETAILED: NASAL DORSUM
LOCATION DETAILED: RIGHT PROXIMAL PRETIBIAL REGION
LOCATION DETAILED: LEFT INFERIOR PREAURICULAR CHEEK
LOCATION DETAILED: LEFT PROXIMAL POSTERIOR UPPER ARM
LOCATION DETAILED: RIGHT ANTITRAGUS

## 2024-09-10 ASSESSMENT — LOCATION SIMPLE DESCRIPTION DERM
LOCATION SIMPLE: GLABELLA
LOCATION SIMPLE: LEFT LIP
LOCATION SIMPLE: LEFT FOREHEAD
LOCATION SIMPLE: RIGHT PRETIBIAL REGION
LOCATION SIMPLE: ABDOMEN
LOCATION SIMPLE: RIGHT SHOULDER
LOCATION SIMPLE: LEFT EAR
LOCATION SIMPLE: TRAPEZIAL NECK
LOCATION SIMPLE: RIGHT FOREHEAD
LOCATION SIMPLE: NOSE
LOCATION SIMPLE: RIGHT EAR
LOCATION SIMPLE: LEFT CHEEK
LOCATION SIMPLE: RIGHT TEMPLE
LOCATION SIMPLE: RIGHT UPPER BACK
LOCATION SIMPLE: LEFT UPPER ARM
LOCATION SIMPLE: LEFT SCALP
LOCATION SIMPLE: RIGHT LIP
LOCATION SIMPLE: UPPER BACK
LOCATION SIMPLE: SCALP
LOCATION SIMPLE: RIGHT CHEEK

## 2024-09-10 ASSESSMENT — LOCATION ZONE DERM
LOCATION ZONE: LEG
LOCATION ZONE: NOSE
LOCATION ZONE: FACE
LOCATION ZONE: LIP
LOCATION ZONE: SCALP
LOCATION ZONE: ARM
LOCATION ZONE: NECK
LOCATION ZONE: EAR
LOCATION ZONE: TRUNK

## 2024-09-10 NOTE — PROCEDURE: LIQUID NITROGEN
Duration Of Freeze Thaw-Cycle (Seconds): 0
Render Note In Bullet Format When Appropriate: No
Post-Care Instructions: I reviewed with the patient in detail post-care instructions. Patient is to wear sunprotection, and avoid picking at any of the treated lesions. Pt may apply Vaseline to crusted or scabbing areas.
Number Of Freeze-Thaw Cycles: 2 freeze-thaw cycles
Show Aperture Variable?: Yes
Consent: The patient's consent was obtained including but not limited to risks of crusting, scabbing, blistering, scarring, darker or lighter pigmentary change, recurrence, incomplete removal and infection.
Detail Level: Detailed

## 2024-09-10 NOTE — PROCEDURE: TREATMENT REGIMEN
Initiate Treatment: fluorouracil 5 % topical cream : Apply thin layer to affected areas once nightly for 1 week. Take 3 weeks off. Repeat cycle two more times. Do not get in eyes, keep away from pets and children, wash hands well after application. Wait for cream to dry before going to bed, wash off in AM.\\ntriamcinolone acetonide 0.1 % topical ointment : Apply to affected areas treated with fluorouracil in the mornings if needed for itch or discomfort during Fluorouracil cream treatment. Keep in refrigerator. Avoid the eyes.
Detail Level: Zone

## 2024-09-10 NOTE — PROCEDURE: BIOPSY BY SHAVE METHOD
Detail Level: Detailed
Depth Of Biopsy: dermis
Was A Bandage Applied: Yes
Size Of Lesion In Cm: 1
X Size Of Lesion In Cm: 0
Biopsy Type: H and E
Biopsy Method: Dermablade
Anesthesia Type: 1% lidocaine with epinephrine
Anesthesia Volume In Cc: 0.5
Hemostasis: Drysol
Wound Care: Petrolatum
Dressing: bandage
Destruction After The Procedure: No
Type Of Destruction Used: Curettage
Curettage Text: The wound bed was treated with curettage after the biopsy was performed.
Cryotherapy Text: The wound bed was treated with cryotherapy after the biopsy was performed.
Electrodesiccation Text: The wound bed was treated with electrodesiccation after the biopsy was performed.
Electrodesiccation And Curettage Text: The wound bed was treated with electrodesiccation and curettage after the biopsy was performed.
Silver Nitrate Text: The wound bed was treated with silver nitrate after the biopsy was performed.
Lab: [id number]
Lab Facility: [id number]
Consent: Written consent was obtained and risks were reviewed including but not limited to scarring, infection, bleeding, scabbing, incomplete removal, nerve damage and allergy to anesthesia.
Post-Care Instructions: I reviewed with the patient in detail post-care instructions. Patient is to keep the biopsy site dry overnight, and then apply bacitracin twice daily until healed. Patient may apply hydrogen peroxide soaks to remove any crusting.
Notification Instructions: Patient will be notified of biopsy results. However, patient instructed to call the office if not contacted within 2 weeks.
Billing Type: Third-Party Bill
Information: Selecting Yes will display possible errors in your note based on the variables you have selected. This validation is only offered as a suggestion for you. PLEASE NOTE THAT THE VALIDATION TEXT WILL BE REMOVED WHEN YOU FINALIZE YOUR NOTE. IF YOU WANT TO FAX A PRELIMINARY NOTE YOU WILL NEED TO TOGGLE THIS TO 'NO' IF YOU DO NOT WANT IT IN YOUR FAXED NOTE.
Size Of Lesion In Cm: 0.4

## 2024-09-25 ENCOUNTER — APPOINTMENT (RX ONLY)
Dept: URBAN - METROPOLITAN AREA CLINIC 170 | Facility: CLINIC | Age: 89
Setting detail: DERMATOLOGY
End: 2024-09-25

## 2024-09-25 DIAGNOSIS — L57.8 OTHER SKIN CHANGES DUE TO CHRONIC EXPOSURE TO NONIONIZING RADIATION: ICD-10-CM

## 2024-09-25 DIAGNOSIS — L57.0 ACTINIC KERATOSIS: ICD-10-CM

## 2024-09-25 PROCEDURE — ? LIQUID NITROGEN

## 2024-09-25 PROCEDURE — 17000 DESTRUCT PREMALG LESION: CPT

## 2024-09-25 PROCEDURE — 99213 OFFICE O/P EST LOW 20 MIN: CPT | Mod: 25

## 2024-09-25 PROCEDURE — ? COUNSELING

## 2024-09-25 ASSESSMENT — LOCATION SIMPLE DESCRIPTION DERM: LOCATION SIMPLE: LEFT EAR

## 2024-09-25 ASSESSMENT — LOCATION DETAILED DESCRIPTION DERM: LOCATION DETAILED: LEFT SUPERIOR HELIX

## 2024-09-25 ASSESSMENT — LOCATION ZONE DERM: LOCATION ZONE: EAR

## 2024-09-25 NOTE — PROCEDURE: LIQUID NITROGEN
Post-Care Instructions: I reviewed with the patient in detail post-care instructions. Patient is to wear sunprotection, and avoid picking at any of the treated lesions. Pt may apply Vaseline to crusted or scabbing areas.
Render Post-Care Instructions In Note?: no
Consent: The patient's consent was obtained including but not limited to risks of crusting, scabbing, blistering, scarring, darker or lighter pigmentary change, recurrence, incomplete removal and infection.
Show Applicator Variable?: Yes
Detail Level: Detailed
Duration Of Freeze Thaw-Cycle (Seconds): 0
Number Of Freeze-Thaw Cycles: 2 freeze-thaw cycles

## 2024-09-25 NOTE — PROCEDURE: COUNSELING
Detail Level: Detailed
Nicotinamide Supplementation Recommendations: Continue 500 mg nicotinamide BID
Sunscreen Recommendations: continue spf 30 + daily, hats, long sleeves

## 2024-09-26 ENCOUNTER — APPOINTMENT (RX ONLY)
Dept: URBAN - METROPOLITAN AREA CLINIC 170 | Facility: CLINIC | Age: 89
Setting detail: DERMATOLOGY
End: 2024-09-26

## 2024-09-26 PROBLEM — D04.4 CARCINOMA IN SITU OF SKIN OF SCALP AND NECK: Status: ACTIVE | Noted: 2024-09-26

## 2024-09-26 PROCEDURE — 17311 MOHS 1 STAGE H/N/HF/G: CPT | Mod: 79

## 2024-09-26 PROCEDURE — ? MOHS SURGERY

## 2024-09-26 PROCEDURE — ? PRESCRIPTION

## 2024-09-26 RX ORDER — MUPIROCIN 20 MG/G
OINTMENT TOPICAL AS DIRECTED
Qty: 22 | Refills: 0 | Status: ERX | COMMUNITY
Start: 2024-09-26

## 2024-09-26 RX ADMIN — MUPIROCIN: 20 OINTMENT TOPICAL at 00:00

## 2024-09-26 NOTE — PROCEDURE: MOHS SURGERY
Mohs Case Number: 
Date Of Previous Biopsy (Optional): 9/10/24
Previous Accession (Optional): [id number]
Biopsy Photograph Reviewed: Yes
Referring Physician (Optional): Margaret Hubbard MD
Consent Type: Consent 1 (Standard)
Eye Shield Used: No
Surgeon Performing Repair (Optional): Momo Augustin
Initial Size Of Lesion: 1.4
X Size Of Lesion In Cm (Optional): 1.1
Number Of Stages: 1
Primary Defect Length In Cm (Final Defect Size - Required For Flaps/Grafts): 1.8
Primary Defect Width In Cm (Final Defect Size - Required For Flaps/Grafts): 1.6
Primary Defect Depth In Cm (Optional But Required For Some Insurers): 0
Repair Type: No repair - secondary intention
Which Instrument Did You Use For Dermabrasion?: Wire Brush
Which Eyelid Repair Cpt Are You Using?: 32583
Oculoplastic Surgeon Procedure Text (A): After obtaining clear surgical margins the patient was sent to oculoplastics for surgical repair.  The patient understands they will receive post-surgical care and follow-up from the referring physician's office.
Otolaryngologist Procedure Text (A): After obtaining clear surgical margins the patient was sent to otolaryngology for surgical repair.  The patient understands they will receive post-surgical care and follow-up from the referring physician's office.
Plastic Surgeon Procedure Text (A): After obtaining clear surgical margins the patient was sent to plastics for surgical repair.  The patient understands they will receive post-surgical care and follow-up from the referring physician's office.
Mid-Level Procedure Text (A): After obtaining clear surgical margins the patient was sent to a mid-level provider for surgical repair.  The patient understands they will receive post-surgical care and follow-up from the mid-level provider.
Provider Procedure Text (A): After obtaining clear surgical margins the defect was repaired by another provider.
Asc Procedure Text (A): After obtaining clear surgical margins the patient was sent to an ASC for surgical repair.  The patient understands they will receive post-surgical care and follow-up from the ASC physician.
Suturegard Retention Suture: 2-0 Nylon
Retention Suture Bite Size: 3 mm
Length To Time In Minutes Device Was In Place: 10
Undermining Type: Entire Wound
Debridement Text: The wound edges were debrided prior to proceeding with the closure to facilitate wound healing.
Helical Rim Text: The closure involved the helical rim.
Vermilion Border Text: The closure involved the vermilion border.
Nostril Rim Text: The closure involved the nostril rim.
Retention Suture Text: Retention sutures were placed to support the closure and prevent dehiscence.
Location Indication Override (Is Already Calculated Based On Selected Body Location): Area M
Area H Indication Text: Tumor located in Area H (eyelids, eyebrows, nose, lips, chin, ear, pre-auricular, post-auricular, temple, genitalia, hands, feet, ankles and areola).  Tissue conservation and complete peripheral and deep margin assessment are critical in these anatomic locations.
Area M Indication Text: Tumor located in Area M (cheek, forehead, scalp, neck, jawline and pretibial skin).  Mohs surgery is indicated for tumors in these anatomic locations.
Area L Indication Text: Tumor is located in Area L (trunk and extremities).  Mohs surgery is indicated for larger tumors, or tumors with aggressive histologic features, in these anatomic locations.
Depth Of Tumor Invasion (For Histology): tumor not visualized (deep and peripheral margins are clear of tumor)
Perineural Invasion (For Histology - Be Specific If Possible): absent
Special Stains Stage 1 - Results: Base On Clearance Noted Above
Stage 2: Additional Anesthesia Type: 1% lidocaine with epinephrine
Staging Info: By selecting yes to the question above you will include information on AJCC 8 tumor staging in your Mohs note. Information on tumor staging will be automatically added for SCCs on the head and neck. AJCC 8 includes tumor size, tumor depth, perineural involvement and bone invasion.
Tumor Depth: Less than 6mm from granular layer and no invasion beyond the subcutaneous fat
Was The Patient On Physician Recommended Anticoagulation Therapy?: Please Select the Appropriate Response
Medical Necessity Statement: Based on my clinical judgement, Mohs surgery is the most appropriate treatment for this cancer compared to other treatments.
Alternatives Discussed Intro (Do Not Add Period): I discussed alternative treatments to Mohs surgery and specifically discussed the risks and benefits of
Consent 1/Introductory Paragraph: Mohs surgery was explained to the patient and consent was obtained. The risks, benefits and alternatives to therapy were discussed in detail. Specifically, the risks of infection, scarring, bleeding, prolonged wound healing, incomplete removal, allergy to anesthesia or topical antimicrobials, sensory or motor nerve injury, and recurrence were addressed. Prior to the procedure, the treatment site was clearly identified and confirmed by the patient. All components of Universal Protocol/PAUSE Rule completed.
Consent 2/Introductory Paragraph: Mohs surgery was explained to the patient and consent was obtained. The risks, benefits and alternatives to therapy were discussed in detail. Specifically, the risks of infection, scarring, bleeding, prolonged wound healing, incomplete removal, allergy to anesthetic, cleansing solutions, or medication, nerve injury and recurrence were addressed. Prior to the procedure, the treatment site was clearly identified and confirmed by the patient. All components of Universal Protocol/PAUSE Rule completed.
Consent 3/Introductory Paragraph: I gave the patient a chance to ask questions they had about the procedure.  Following this I explained the Mohs procedure and consent was obtained. The risks, benefits and alternatives to therapy were discussed in detail. Specifically, the risks of infection, scarring, bleeding, prolonged wound healing, incomplete removal, allergy to anesthesia, nerve injury and recurrence were addressed. Prior to the procedure, the treatment site was clearly identified and confirmed by the patient. All components of Universal Protocol/PAUSE Rule completed.
Consent (Temporal Branch)/Introductory Paragraph: The rationale for Mohs was explained to the patient and consent was obtained. The risks, benefits and alternatives to therapy were discussed in detail. Specifically, the risks of damage to the temporal branch of the facial nerve, infection, scarring, bleeding, prolonged wound healing, incomplete removal, allergy to anesthesia, and recurrence were addressed. Prior to the procedure, the treatment site was clearly identified and confirmed by the patient. All components of Universal Protocol/PAUSE Rule completed.
Consent (Marginal Mandibular)/Introductory Paragraph: The rationale for Mohs was explained to the patient and consent was obtained. The risks, benefits and alternatives to therapy were discussed in detail. Specifically, the risks of damage to the marginal mandibular branch of the facial nerve, infection, scarring, bleeding, prolonged wound healing, incomplete removal, allergy to anesthesia, and recurrence were addressed. Prior to the procedure, the treatment site was clearly identified and confirmed by the patient. All components of Universal Protocol/PAUSE Rule completed.
Consent (Spinal Accessory)/Introductory Paragraph: The rationale for Mohs was explained to the patient and consent was obtained. The risks, benefits and alternatives to therapy were discussed in detail. Specifically, the risks of damage to the spinal accessory nerve, infection, scarring, bleeding, prolonged wound healing, incomplete removal, allergy to anesthesia, and recurrence were addressed. Prior to the procedure, the treatment site was clearly identified and confirmed by the patient. All components of Universal Protocol/PAUSE Rule completed.
Consent (Near Eyelid Margin)/Introductory Paragraph: The rationale for Mohs was explained to the patient and consent was obtained. The risks, benefits and alternatives to therapy were discussed in detail. Specifically, the risks of ectropion or eyelid deformity, infection, scarring, bleeding, prolonged wound healing, incomplete removal, allergy to anesthesia, nerve injury and recurrence were addressed. Prior to the procedure, the treatment site was clearly identified and confirmed by the patient. All components of Universal Protocol/PAUSE Rule completed.
Consent (Ear)/Introductory Paragraph: The rationale for Mohs was explained to the patient and consent was obtained. The risks, benefits and alternatives to therapy were discussed in detail. Specifically, the risks of ear deformity, infection, scarring, bleeding, prolonged wound healing, incomplete removal, allergy to anesthesia, nerve injury and recurrence were addressed. Prior to the procedure, the treatment site was clearly identified and confirmed by the patient. All components of Universal Protocol/PAUSE Rule completed.
Consent (Nose)/Introductory Paragraph: The rationale for Mohs was explained to the patient and consent was obtained. The risks, benefits and alternatives to therapy were discussed in detail. Specifically, the risks of nasal deformity, changes in the flow of air through the nose, infection, scarring, bleeding, prolonged wound healing, incomplete removal, allergy to anesthesia, nerve injury and recurrence were addressed. Prior to the procedure, the treatment site was clearly identified and confirmed by the patient. All components of Universal Protocol/PAUSE Rule completed.
Consent (Lip)/Introductory Paragraph: The rationale for Mohs was explained to the patient and consent was obtained. The risks, benefits and alternatives to therapy were discussed in detail. Specifically, the risks of lip deformity, changes in the oral aperture, infection, scarring, bleeding, prolonged wound healing, incomplete removal, allergy to anesthesia, nerve injury and recurrence were addressed. Prior to the procedure, the treatment site was clearly identified and confirmed by the patient. All components of Universal Protocol/PAUSE Rule completed.
Consent (Scalp)/Introductory Paragraph: The rationale for Mohs was explained to the patient and consent was obtained. The risks, benefits and alternatives to therapy were discussed in detail. Specifically, the risks of changes in hair growth pattern secondary to repair, infection, scarring, bleeding, prolonged wound healing, incomplete removal, allergy to anesthesia, nerve injury and recurrence were addressed. Prior to the procedure, the treatment site was clearly identified and confirmed by the patient. All components of Universal Protocol/PAUSE Rule completed.
Detail Level: Detailed
Postop Diagnosis: same
Anesthesia Type: 1% lidocaine with epinephrine and a 1:10 solution of 8.4% sodium bicarbonate
Additional Anesthesia Volume In Cc: 6
Hemostasis: Electrodesiccation
Estimated Blood Loss (Cc): minimal
Repair Anesthesia Method: local infiltration
Brow Lift Text: A midfrontal incision was made medially to the defect to allow access to the tissues just superior to the left eyebrow. Following careful dissection inferiorly in a supraperiosteal plane to the level of the left eyebrow, several 3-0 monocryl sutures were used to resuspend the eyebrow orbicularis oculi muscular unit to the superior frontal bone periosteum. This resulted in an appropriate reapproximation of static eyebrow symmetry and correction of the left brow ptosis.
Deep Sutures: 5-0 Monocryl
Epidermal Sutures: 6-0 Prolene
Epidermal Closure: running
Suturegard Intro: Intraoperative tissue expansion was performed, utilizing the SUTUREGARD device, in order to reduce wound tension.
Suturegard Body: The suture ends were repeatedly re-tightened and re-clamped to achieve the desired tissue expansion.
Hemigard Intro: Due to skin fragility and wound tension, it was decided to use HEMIGARD adhesive retention suture devices to permit a linear closure. The skin was cleaned and dried for a 6cm distance away from the wound. Excessive hair, if present, was removed to allow for adhesion.
Hemigard Postcare Instructions: The HEMIGARD strips are to remain completely dry for at least 5-7 days.
Donor Site Anesthesia Type: same as repair anesthesia
Epidermal Closure Graft Donor Site (Optional): simple interrupted
Closure 2 Information: This tab is for additional flaps and grafts, including complex repair and grafts and complex repair and flaps. You can also specify a different location for the additional defect, if the location is the same you do not need to select a new one. We will insert the automated text for the repair you select below just as we do for solitary flaps and grafts. Please note that at this time if you select a location with a different insurance zone you will need to override the ICD10 and CPT if appropriate.
Closure 3 Information: This tab is for additional flaps and grafts above and beyond our usual structured repairs.  Please note if you enter information here it will not currently bill and you will need to add the billing information manually.
Wound Care: Petrolatum
Dressing: pressure dressing with telfa
Dressing (No Sutures): dry sterile dressing
Wound Check: 14 days
Unna Boot Text: An Unna boot was placed to help immobilize the limb and facilitate more rapid healing.
Home Suture Removal Text: Patient was provided instructions on removing sutures and will remove their sutures at home.  If they have any questions or difficulties they will call the office.
Post-Care Instructions: Written post-operative would care instructions were provided and reviewed with the patient. Patient is not to engage in any heavy lifting, exercise, or swimming for the next 7-14 days. Should the patient develop any fevers, chills, bleeding, severe pain patient will contact the office immediately.
Pain Refusal Text: I offered to prescribe pain medication but the patient refused to take this medication.
Mauc Instructions: By selecting yes to the question below the MAUC number will be added into the note.  This will be calculated automatically based on the diagnosis chosen, the size entered, the body zone selected (H,M,L) and the specific indications you chose. You will also have the option to override the Mohs AUC if you disagree with the automatically calculated number and this option is found in the Case Summary tab.
Where Do You Want The Question To Include Opioid Counseling Located?: Case Summary Tab
Eye Protection Verbiage: Before proceeding with the stage, a plastic scleral shield was inserted. The globe was anesthetized with a few drops of 1% lidocaine with 1:100,000 epinephrine. Then, an appropriate sized scleral shield was chosen and coated with lacrilube ointment. The shield was gently inserted and left in place for the duration of each stage. After the stage was completed, the shield was gently removed.
Mohs Method Verbiage: A full thickness incision was made surrounding the clinically apparent tumor margins. Superficial orienting hashes were made, and the specimen was harvested as a microscopic controlled layer.
Surgeon/Pathologist Verbiage (Will Incorporate Name Of Surgeon From Intro If Not Blank): operated in two distinct and integrated capacities as the surgeon and pathologist.
Mohs Histo Method Verbiage: Each section was then chromacoded and processed in the Mohs lab using the Mohs protocol and submitted for frozen section.
Subsequent Stages Histo Method Verbiage: Using a similar technique to that described above, a thin layer of tissue was removed from all areas where tumor was visible on the previous stage.  The tissue was again oriented, mapped, dyed, and processed as above.
Mohs Rapid Report Verbiage: The area of clinically evident tumor was marked with skin marking ink and appropriately hatched.  The initial incision was made following the Mohs approach through the skin.  The specimen was taken to the lab, divided into the necessary number of pieces, chromacoded and processed according to the Mohs protocol.  This was repeated in successive stages until a tumor free defect was achieved.
Complex Repair Preamble Text (Leave Blank If You Do Not Want): Extensive wide undermining was performed.
Intermediate Repair Preamble Text (Leave Blank If You Do Not Want): Undermining was performed.
Crescentic Intermediate Repair Preamble Text (Leave Blank If You Do Not Want): Undermining was performed with blunt dissection.
Graft Cartilage Fenestration Text: The cartilage was fenestrated with a 2mm punch biopsy to help facilitate graft survival and healing.
Non-Graft Cartilage Fenestration Text: The cartilage was fenestrated with a 2mm punch biopsy to help facilitate healing.
Secondary Intention Text (Leave Blank If You Do Not Want): The defect will heal with secondary intention.
No Repair - Repaired With Adjacent Surgical Defect Text (Leave Blank If You Do Not Want): After obtaining clear surgical margins the defect was repaired concurrently with another surgical defect which was in close approximation.
Adjacent Tissue Transfer Text: The defect edges were debeveled with a #15 scalpel blade.  Given the location of the defect and the proximity to free margins an adjacent tissue transfer was deemed most appropriate.  Using a sterile surgical marker, an appropriate flap was drawn incorporating the defect and placing the expected incisions within the relaxed skin tension lines where possible.    The area thus outlined was incised deep to adipose tissue with a #15 scalpel blade.  The skin margins were undermined to an appropriate distance in all directions utilizing iris scissors.
Advancement Flap (Single) Text: The defect edges were debeveled with a #15 scalpel blade.  Given the location of the defect and the proximity to free margins a single advancement flap was deemed most appropriate.  Using a sterile surgical marker, an appropriate advancement flap was drawn incorporating the defect and placing the expected incisions within the relaxed skin tension lines where possible.    The area thus outlined was incised deep to adipose tissue with a #15 scalpel blade.  The skin margins were undermined to an appropriate distance in all directions utilizing iris scissors.
Advancement Flap (Double) Text: The defect edges were debeveled with a #15 scalpel blade.  Given the location of the defect and the proximity to free margins a double advancement flap was deemed most appropriate.  Using a sterile surgical marker, the appropriate advancement flaps were drawn incorporating the defect and placing the expected incisions within the relaxed skin tension lines where possible.    The area thus outlined was incised deep to adipose tissue with a #15 scalpel blade.  The skin margins were undermined to an appropriate distance in all directions utilizing iris scissors.
Advancement-Rotation Flap Text: The defect edges were debeveled with a #15 scalpel blade.  Given the location of the defect, shape of the defect and the proximity to free margins an advancement-rotation flap was deemed most appropriate.  Using a sterile surgical marker, an appropriate flap was drawn incorporating the defect and placing the expected incisions within the relaxed skin tension lines where possible. The area thus outlined was incised deep to adipose tissue with a #15 scalpel blade.  The skin margins were undermined to an appropriate distance in all directions utilizing iris scissors.
Alar Island Pedicle Flap Text: The defect edges were debeveled with a #15 scalpel blade.  Given the location of the defect, shape of the defect and the proximity to the alar rim an island pedicle advancement flap was deemed most appropriate.  Using a sterile surgical marker, an appropriate advancement flap was drawn incorporating the defect, outlining the appropriate donor tissue and placing the expected incisions within the nasal ala running parallel to the alar rim. The area thus outlined was incised with a #15 scalpel blade.  The skin margins were undermined minimally to an appropriate distance in all directions around the primary defect and laterally outward around the island pedicle utilizing iris scissors.  There was minimal undermining beneath the pedicle flap.
A-T Advancement Flap Text: The defect edges were debeveled with a #15 scalpel blade.  Given the location of the defect, shape of the defect and the proximity to free margins an A-T advancement flap was deemed most appropriate.  Using a sterile surgical marker, an appropriate advancement flap was drawn incorporating the defect and placing the expected incisions within the relaxed skin tension lines where possible.    The area thus outlined was incised deep to adipose tissue with a #15 scalpel blade.  The skin margins were undermined to an appropriate distance in all directions utilizing iris scissors.
Banner Transposition Flap Text: The defect edges were debeveled with a #15 scalpel blade.  Given the location of the defect and the proximity to free margins a Banner transposition flap was deemed most appropriate.  Using a sterile surgical marker, an appropriate flap drawn around the defect. The area thus outlined was incised deep to adipose tissue with a #15 scalpel blade.  The skin margins were undermined to an appropriate distance in all directions utilizing iris scissors.
Bilateral Helical Rim Advancement Flap Text: The defect edges were debeveled with a #15 blade scalpel.  Given the location of the defect and the proximity to free margins (helical rim) a bilateral helical rim advancement flap was deemed most appropriate.  Using a sterile surgical marker, the appropriate advancement flaps were drawn incorporating the defect and placing the expected incisions between the helical rim and antihelix where possible.  The area thus outlined was incised through and through with a #15 scalpel blade.  With a skin hook and iris scissors, the flaps were gently and sharply undermined and freed up.
Bilateral Rotation Flap Text: The defect edges were debeveled with a #15 scalpel blade. Given the location of the defect, shape of the defect and the proximity to free margins a bilateral rotation flap was deemed most appropriate. Using a sterile surgical marker, an appropriate rotation flap was drawn incorporating the defect and placing the expected incisions within the relaxed skin tension lines where possible. The area thus outlined was incised deep to adipose tissue with a #15 scalpel blade. The skin margins were undermined to an appropriate distance in all directions utilizing iris scissors. Following this, the designed flap was carried over into the primary defect and sutured into place.
Bilobed Flap Text: The defect edges were debeveled with a #15 scalpel blade.  Given the location of the defect and the proximity to free margins a bilobe flap was deemed most appropriate.  Using a sterile surgical marker, an appropriate bilobe flap drawn around the defect.    The area thus outlined was incised deep to adipose tissue with a #15 scalpel blade.  The skin margins were undermined to an appropriate distance in all directions utilizing iris scissors.
Bilobed Transposition Flap Text: The defect edges were debeveled with a #15 scalpel blade.  Given the location of the defect and the proximity to free margins a bilobed transposition flap was deemed most appropriate.  Using a sterile surgical marker, an appropriate bilobe flap drawn around the defect.    The area thus outlined was incised deep to adipose tissue with a #15 scalpel blade.  The skin margins were undermined to an appropriate distance in all directions utilizing iris scissors.
Bi-Rhombic Flap Text: The defect edges were debeveled with a #15 scalpel blade.  Given the location of the defect and the proximity to free margins a bi-rhombic flap was deemed most appropriate.  Using a sterile surgical marker, an appropriate rhombic flap was drawn incorporating the defect. The area thus outlined was incised deep to adipose tissue with a #15 scalpel blade.  The skin margins were undermined to an appropriate distance in all directions utilizing iris scissors.
Burow's Advancement Flap Text: The defect edges were debeveled with a #15 scalpel blade.  Given the location of the defect and the proximity to free margins a Burow's advancement flap was deemed most appropriate.  Using a sterile surgical marker, the appropriate advancement flap was drawn incorporating the defect and placing the expected incisions within the relaxed skin tension lines where possible.    The area thus outlined was incised deep to adipose tissue with a #15 scalpel blade.  The skin margins were undermined to an appropriate distance in all directions utilizing iris scissors.
Chonodrocutaneous Helical Advancement Flap Text: The defect edges were debeveled with a #15 scalpel blade.  Given the location of the defect and the proximity to free margins a chondrocutaneous helical advancement flap was deemed most appropriate.  Using a sterile surgical marker, the appropriate advancement flap was drawn incorporating the defect and placing the expected incisions within the relaxed skin tension lines where possible.    The area thus outlined was incised deep to adipose tissue with a #15 scalpel blade.  The skin margins were undermined to an appropriate distance in all directions utilizing iris scissors.
Crescentic Advancement Flap Text: The defect edges were debeveled with a #15 scalpel blade.  Given the location of the defect and the proximity to free margins a crescentic advancement flap was deemed most appropriate.  Using a sterile surgical marker, the appropriate advancement flap was drawn incorporating the defect and placing the expected incisions within the relaxed skin tension lines where possible.    The area thus outlined was incised deep to adipose tissue with a #15 scalpel blade.  The skin margins were undermined to an appropriate distance in all directions utilizing iris scissors.
Dorsal Nasal Flap Text: The defect edges were debeveled with a #15 scalpel blade.  Given the location of the defect and the proximity to free margins a dorsal nasal flap was deemed most appropriate.  Using a sterile surgical marker, an appropriate dorsal nasal flap was drawn around the defect.    The area thus outlined was incised deep to adipose tissue with a #15 scalpel blade.  The skin margins were undermined to an appropriate distance in all directions utilizing iris scissors.
Double Island Pedicle Flap Text: The defect edges were debeveled with a #15 scalpel blade.  Given the location of the defect, shape of the defect and the proximity to free margins a double island pedicle advancement flap was deemed most appropriate.  Using a sterile surgical marker, an appropriate advancement flap was drawn incorporating the defect, outlining the appropriate donor tissue and placing the expected incisions within the relaxed skin tension lines where possible.    The area thus outlined was incised deep to adipose tissue with a #15 scalpel blade.  The skin margins were undermined to an appropriate distance in all directions around the primary defect and laterally outward around the island pedicle utilizing iris scissors.  There was minimal undermining beneath the pedicle flap.
Double O-Z Flap Text: The defect edges were debeveled with a #15 scalpel blade.  Given the location of the defect, shape of the defect and the proximity to free margins a Double O-Z flap was deemed most appropriate.  Using a sterile surgical marker, an appropriate transposition flap was drawn incorporating the defect and placing the expected incisions within the relaxed skin tension lines where possible. The area thus outlined was incised deep to adipose tissue with a #15 scalpel blade.  The skin margins were undermined to an appropriate distance in all directions utilizing iris scissors.
Double O-Z Plasty Text: The defect edges were debeveled with a #15 scalpel blade.  Given the location of the defect, shape of the defect and the proximity to free margins a Double O-Z plasty (double transposition flap) was deemed most appropriate.  Using a sterile surgical marker, the appropriate transposition flaps were drawn incorporating the defect and placing the expected incisions within the relaxed skin tension lines where possible. The area thus outlined was incised deep to adipose tissue with a #15 scalpel blade.  The skin margins were undermined to an appropriate distance in all directions utilizing iris scissors.  Hemostasis was achieved with electrocautery.  The flaps were then transposed into place, one clockwise and the other counterclockwise, and anchored with interrupted buried subcutaneous sutures.
Double Z Plasty Text: The lesion was extirpated to the level of the fat with a #15 scalpel blade. Given the location of the defect, shape of the defect and the proximity to free margins a double Z-plasty was deemed most appropriate for repair. Using a sterile surgical marker, the appropriate transposition arms of the double Z-plasty were drawn incorporating the defect and placing the expected incisions within the relaxed skin tension lines where possible. The area thus outlined was incised deep to adipose tissue with a #15 scalpel blade. The skin margins were undermined to an appropriate distance in all directions utilizing iris scissors. The opposing transposition arms were then transposed and carried over into place in opposite direction and anchored with interrupted buried subcutaneous sutures.
Ear Star Wedge Flap Text: The defect edges were debeveled with a #15 blade scalpel.  Given the location of the defect and the proximity to free margins (helical rim) an ear star wedge flap was deemed most appropriate.  Using a sterile surgical marker, the appropriate flap was drawn incorporating the defect and placing the expected incisions between the helical rim and antihelix where possible.  The area thus outlined was incised through and through with a #15 scalpel blade.
Flip-Flop Flap Text: The defect edges were debeveled with a #15 blade scalpel.  Given the location of the defect and the proximity to free margins a flip-flop flap was deemed most appropriate. Using a sterile surgical marker, the appropriate flap was drawn incorporating the defect and placing the expected incisions between the helical rim and antihelix where possible.  The area thus outlined was incised through and through with a #15 scalpel blade. Following this, the designed flap was carried over into the primary defect and sutured into place.
Hatchet Flap Text: The defect edges were debeveled with a #15 scalpel blade.  Given the location of the defect, shape of the defect and the proximity to free margins a hatchet flap was deemed most appropriate.  Using a sterile surgical marker, an appropriate hatchet flap was drawn incorporating the defect and placing the expected incisions within the relaxed skin tension lines where possible.    The area thus outlined was incised deep to adipose tissue with a #15 scalpel blade.  The skin margins were undermined to an appropriate distance in all directions utilizing iris scissors.
Helical Rim Advancement Flap Text: The defect edges were debeveled with a #15 blade scalpel.  Given the location of the defect and the proximity to free margins (helical rim) a double helical rim advancement flap was deemed most appropriate.  Using a sterile surgical marker, the appropriate advancement flaps were drawn incorporating the defect and placing the expected incisions between the helical rim and antihelix where possible.  The area thus outlined was incised through and through with a #15 scalpel blade.  With a skin hook and iris scissors, the flaps were gently and sharply undermined and freed up.
H Plasty Text: Given the location of the defect, shape of the defect and the proximity to free margins a H-plasty was deemed most appropriate for repair.  Using a sterile surgical marker, the appropriate advancement arms of the H-plasty were drawn incorporating the defect and placing the expected incisions within the relaxed skin tension lines where possible. The area thus outlined was incised deep to adipose tissue with a #15 scalpel blade. The skin margins were undermined to an appropriate distance in all directions utilizing iris scissors.  The opposing advancement arms were then advanced into place in opposite direction and anchored with interrupted buried subcutaneous sutures.
Island Pedicle Flap Text: The defect edges were debeveled with a #15 scalpel blade.  Given the location of the defect, shape of the defect and the proximity to free margins an island pedicle advancement flap was deemed most appropriate.  Using a sterile surgical marker, an appropriate advancement flap was drawn incorporating the defect, outlining the appropriate donor tissue and placing the expected incisions within the relaxed skin tension lines where possible.    The area thus outlined was incised deep to adipose tissue with a #15 scalpel blade.  The skin margins were undermined to an appropriate distance in all directions around the primary defect and laterally outward around the island pedicle utilizing iris scissors.  There was minimal undermining beneath the pedicle flap.
Island Pedicle Flap With Canthal Suspension Text: The defect edges were debeveled with a #15 scalpel blade.  Given the location of the defect, shape of the defect and the proximity to free margins an island pedicle advancement flap was deemed most appropriate.  Using a sterile surgical marker, an appropriate advancement flap was drawn incorporating the defect, outlining the appropriate donor tissue and placing the expected incisions within the relaxed skin tension lines where possible. The area thus outlined was incised deep to adipose tissue with a #15 scalpel blade.  The skin margins were undermined to an appropriate distance in all directions around the primary defect and laterally outward around the island pedicle utilizing iris scissors.  There was minimal undermining beneath the pedicle flap. A suspension suture was placed in the canthal tendon to prevent tension and prevent ectropion.
Island Pedicle Flap-Requiring Vessel Identification Text: The defect edges were debeveled with a #15 scalpel blade.  Given the location of the defect, shape of the defect and the proximity to free margins an island pedicle advancement flap was deemed most appropriate.  Using a sterile surgical marker, an appropriate advancement flap was drawn, based on the axial vessel mentioned above, incorporating the defect, outlining the appropriate donor tissue and placing the expected incisions within the relaxed skin tension lines where possible.    The area thus outlined was incised deep to adipose tissue with a #15 scalpel blade.  The skin margins were undermined to an appropriate distance in all directions around the primary defect and laterally outward around the island pedicle utilizing iris scissors.  There was minimal undermining beneath the pedicle flap.
Keystone Flap Text: The defect edges were debeveled with a #15 scalpel blade.  Given the location of the defect, shape of the defect a keystone flap was deemed most appropriate.  Using a sterile surgical marker, an appropriate keystone flap was drawn incorporating the defect, outlining the appropriate donor tissue and placing the expected incisions within the relaxed skin tension lines where possible. The area thus outlined was incised deep to adipose tissue with a #15 scalpel blade.  The skin margins were undermined to an appropriate distance in all directions around the primary defect and laterally outward around the flap utilizing iris scissors.
Melolabial Transposition Flap Text: The defect edges were debeveled with a #15 scalpel blade.  Given the location of the defect and the proximity to free margins a melolabial flap was deemed most appropriate.  Using a sterile surgical marker, an appropriate melolabial transposition flap was drawn incorporating the defect.    The area thus outlined was incised deep to adipose tissue with a #15 scalpel blade.  The skin margins were undermined to an appropriate distance in all directions utilizing iris scissors.
Mercedes Flap Text: The defect edges were debeveled with a #15 scalpel blade.  Given the location of the defect, shape of the defect and the proximity to free margins a Mercedes flap was deemed most appropriate.  Using a sterile surgical marker, an appropriate advancement flap was drawn incorporating the defect and placing the expected incisions within the relaxed skin tension lines where possible. The area thus outlined was incised deep to adipose tissue with a #15 scalpel blade.  The skin margins were undermined to an appropriate distance in all directions utilizing iris scissors.
Modified Advancement Flap Text: The defect edges were debeveled with a #15 scalpel blade.  Given the location of the defect, shape of the defect and the proximity to free margins a modified advancement flap was deemed most appropriate.  Using a sterile surgical marker, an appropriate advancement flap was drawn incorporating the defect and placing the expected incisions within the relaxed skin tension lines where possible.    The area thus outlined was incised deep to adipose tissue with a #15 scalpel blade.  The skin margins were undermined to an appropriate distance in all directions utilizing iris scissors.
Mucosal Advancement Flap Text: Given the location of the defect, shape of the defect and the proximity to free margins a mucosal advancement flap was deemed most appropriate. Incisions were made with a 15 blade scalpel in the appropriate fashion along the cutaneous vermilion border and the mucosal lip. The remaining actinically damaged mucosal tissue was excised.  The mucosal advancement flap was then elevated to the gingival sulcus with care taken to preserve the neurovascular structures and advanced into the primary defect. Care was taken to ensure that precise realignment of the vermilion border was achieved.
Muscle Hinge Flap Text: The defect edges were debeveled with a #15 scalpel blade.  Given the size, depth and location of the defect and the proximity to free margins a muscle hinge flap was deemed most appropriate.  Using a sterile surgical marker, an appropriate hinge flap was drawn incorporating the defect. The area thus outlined was incised with a #15 scalpel blade.  The skin margins were undermined to an appropriate distance in all directions utilizing iris scissors.
Mustarde Flap Text: The defect edges were debeveled with a #15 scalpel blade.  Given the size, depth and location of the defect and the proximity to free margins a Mustarde flap was deemed most appropriate.  Using a sterile surgical marker, an appropriate flap was drawn incorporating the defect. The area thus outlined was incised with a #15 scalpel blade.  The skin margins were undermined to an appropriate distance in all directions utilizing iris scissors.
Nasal Turnover Hinge Flap Text: The defect edges were debeveled with a #15 scalpel blade.  Given the size, depth, location of the defect and the defect being full thickness a nasal turnover hinge flap was deemed most appropriate.  Using a sterile surgical marker, an appropriate hinge flap was drawn incorporating the defect. The area thus outlined was incised with a #15 scalpel blade. The flap was designed to recreate the nasal mucosal lining and the alar rim. The skin margins were undermined to an appropriate distance in all directions utilizing iris scissors.
Nasalis-Muscle-Based Myocutaneous Island Pedicle Flap Text: Using a #15 blade, an incision was made around the donor flap to the level of the nasalis muscle. Wide lateral undermining was then performed in both the subcutaneous plane above the nasalis muscle, and in a submuscular plane just above periosteum. This allowed the formation of a free nasalis muscle axial pedicle (based on the angular artery) which was still attached to the actual cutaneous flap, increasing its mobility and vascular viability. Hemostasis was obtained with pinpoint electrocoagulation. The flap was mobilized into position and the pivotal anchor points positioned and stabilized with buried interrupted sutures. Subcutaneous and dermal tissues were closed in a multilayered fashion with sutures. Tissue redundancies were excised, and the epidermal edges were apposed without significant tension and sutured with sutures.
Nasalis Myocutaneous Flap Text: Using a #15 blade, an incision was made around the donor flap to the level of the nasalis muscle. Wide lateral undermining was then performed in both the subcutaneous plane above the nasalis muscle, and in a submuscular plane just above periosteum. This allowed the formation of a free nasalis muscle axial pedicle which was still attached to the actual cutaneous flap, increasing its mobility and vascular viability. Hemostasis was obtained with pinpoint electrocoagulation. The flap was mobilized into position and the pivotal anchor points positioned and stabilized with buried interrupted sutures. Subcutaneous and dermal tissues were closed in a multilayered fashion with sutures. Tissue redundancies were excised, and the epidermal edges were apposed without significant tension and sutured with sutures.
Nasolabial Transposition Flap Text: The defect edges were debeveled with a #15 scalpel blade.  Given the size, depth and location of the defect and the proximity to free margins a nasolabial transposition flap was deemed most appropriate. Using a sterile surgical marker, an appropriate flap was drawn incorporating the defect. The area thus outlined was incised with a #15 scalpel blade. The skin margins were undermined to an appropriate distance in all directions utilizing iris scissors. Following this, the designed flap was carried into the primary defect and sutured into place.
Orbicularis Oris Muscle Flap Text: The defect edges were debeveled with a #15 scalpel blade.  Given that the defect affected the competency of the oral sphincter an orbicularis oris muscle flap was deemed most appropriate to restore this competency and normal muscle function.  Using a sterile surgical marker, an appropriate flap was drawn incorporating the defect. The area thus outlined was incised with a #15 scalpel blade.
O-T Advancement Flap Text: The defect edges were debeveled with a #15 scalpel blade.  Given the location of the defect, shape of the defect and the proximity to free margins an O-T advancement flap was deemed most appropriate.  Using a sterile surgical marker, an appropriate advancement flap was drawn incorporating the defect and placing the expected incisions within the relaxed skin tension lines where possible.    The area thus outlined was incised deep to adipose tissue with a #15 scalpel blade.  The skin margins were undermined to an appropriate distance in all directions utilizing iris scissors.
O-T Plasty Text: The defect edges were debeveled with a #15 scalpel blade.  Given the location of the defect, shape of the defect and the proximity to free margins an O-T plasty was deemed most appropriate.  Using a sterile surgical marker, an appropriate O-T plasty was drawn incorporating the defect and placing the expected incisions within the relaxed skin tension lines where possible.    The area thus outlined was incised deep to adipose tissue with a #15 scalpel blade.  The skin margins were undermined to an appropriate distance in all directions utilizing iris scissors.
O-L Flap Text: The defect edges were debeveled with a #15 scalpel blade.  Given the location of the defect, shape of the defect and the proximity to free margins an O-L flap was deemed most appropriate.  Using a sterile surgical marker, an appropriate advancement flap was drawn incorporating the defect and placing the expected incisions within the relaxed skin tension lines where possible.    The area thus outlined was incised deep to adipose tissue with a #15 scalpel blade.  The skin margins were undermined to an appropriate distance in all directions utilizing iris scissors.
O-Z Flap Text: The defect edges were debeveled with a #15 scalpel blade.  Given the location of the defect, shape of the defect and the proximity to free margins an O-Z flap was deemed most appropriate.  Using a sterile surgical marker, an appropriate transposition flap was drawn incorporating the defect and placing the expected incisions within the relaxed skin tension lines where possible. The area thus outlined was incised deep to adipose tissue with a #15 scalpel blade.  The skin margins were undermined to an appropriate distance in all directions utilizing iris scissors.
O-Z Plasty Text: The defect edges were debeveled with a #15 scalpel blade.  Given the location of the defect, shape of the defect and the proximity to free margins an O-Z plasty (double transposition flap) was deemed most appropriate.  Using a sterile surgical marker, the appropriate transposition flaps were drawn incorporating the defect and placing the expected incisions within the relaxed skin tension lines where possible.    The area thus outlined was incised deep to adipose tissue with a #15 scalpel blade.  The skin margins were undermined to an appropriate distance in all directions utilizing iris scissors.  Hemostasis was achieved with electrocautery.  The flaps were then transposed into place, one clockwise and the other counterclockwise, and anchored with interrupted buried subcutaneous sutures.
Peng Advancement Flap Text: The defect edges were debeveled with a #15 scalpel blade.  Given the location of the defect, shape of the defect and the proximity to free margins a Peng advancement flap was deemed most appropriate.  Using a sterile surgical marker, an appropriate advancement flap was drawn incorporating the defect and placing the expected incisions within the relaxed skin tension lines where possible. The area thus outlined was incised deep to adipose tissue with a #15 scalpel blade.  The skin margins were undermined to an appropriate distance in all directions utilizing iris scissors.
Rectangular Flap Text: The defect edges were debeveled with a #15 scalpel blade. Given the location of the defect and the proximity to free margins a rectangular flap was deemed most appropriate. Using a sterile surgical marker, an appropriate rectangular flap was drawn incorporating the defect. The area thus outlined was incised deep to adipose tissue with a #15 scalpel blade. The skin margins were undermined to an appropriate distance in all directions utilizing iris scissors. Following this, the designed flap was carried over into the primary defect and sutured into place.
Rhombic Flap Text: The defect edges were debeveled with a #15 scalpel blade.  Given the location of the defect and the proximity to free margins a rhombic flap was deemed most appropriate.  Using a sterile surgical marker, an appropriate rhombic flap was drawn incorporating the defect.    The area thus outlined was incised deep to adipose tissue with a #15 scalpel blade.  The skin margins were undermined to an appropriate distance in all directions utilizing iris scissors.
Rhomboid Transposition Flap Text: The defect edges were debeveled with a #15 scalpel blade.  Given the location of the defect and the proximity to free margins a rhomboid transposition flap was deemed most appropriate.  Using a sterile surgical marker, an appropriate rhomboid flap was drawn incorporating the defect.    The area thus outlined was incised deep to adipose tissue with a #15 scalpel blade.  The skin margins were undermined to an appropriate distance in all directions utilizing iris scissors.
Rotation Flap Text: The defect edges were debeveled with a #15 scalpel blade.  Given the location of the defect, shape of the defect and the proximity to free margins a rotation flap was deemed most appropriate.  Using a sterile surgical marker, an appropriate rotation flap was drawn incorporating the defect and placing the expected incisions within the relaxed skin tension lines where possible.    The area thus outlined was incised deep to adipose tissue with a #15 scalpel blade.  The skin margins were undermined to an appropriate distance in all directions utilizing iris scissors.
Spiral Flap Text: The defect edges were debeveled with a #15 scalpel blade.  Given the location of the defect, shape of the defect and the proximity to free margins a spiral flap was deemed most appropriate.  Using a sterile surgical marker, an appropriate rotation flap was drawn incorporating the defect and placing the expected incisions within the relaxed skin tension lines where possible. The area thus outlined was incised deep to adipose tissue with a #15 scalpel blade.  The skin margins were undermined to an appropriate distance in all directions utilizing iris scissors.
Staged Advancement Flap Text: The defect edges were debeveled with a #15 scalpel blade.  Given the location of the defect, shape of the defect and the proximity to free margins a staged advancement flap was deemed most appropriate.  Using a sterile surgical marker, an appropriate advancement flap was drawn incorporating the defect and placing the expected incisions within the relaxed skin tension lines where possible. The area thus outlined was incised deep to adipose tissue with a #15 scalpel blade.  The skin margins were undermined to an appropriate distance in all directions utilizing iris scissors.
Star Wedge Flap Text: The defect edges were debeveled with a #15 scalpel blade.  Given the location of the defect, shape of the defect and the proximity to free margins a star wedge flap was deemed most appropriate.  Using a sterile surgical marker, an appropriate rotation flap was drawn incorporating the defect and placing the expected incisions within the relaxed skin tension lines where possible. The area thus outlined was incised deep to adipose tissue with a #15 scalpel blade.  The skin margins were undermined to an appropriate distance in all directions utilizing iris scissors.
Transposition Flap Text: The defect edges were debeveled with a #15 scalpel blade.  Given the location of the defect and the proximity to free margins a transposition flap was deemed most appropriate.  Using a sterile surgical marker, an appropriate transposition flap was drawn incorporating the defect.    The area thus outlined was incised deep to adipose tissue with a #15 scalpel blade.  The skin margins were undermined to an appropriate distance in all directions utilizing iris scissors.
Trilobed Flap Text: The defect edges were debeveled with a #15 scalpel blade.  Given the location of the defect and the proximity to free margins a trilobed flap was deemed most appropriate.  Using a sterile surgical marker, an appropriate trilobed flap drawn around the defect.    The area thus outlined was incised deep to adipose tissue with a #15 scalpel blade.  The skin margins were undermined to an appropriate distance in all directions utilizing iris scissors.
V-Y Flap Text: The defect edges were debeveled with a #15 scalpel blade.  Given the location of the defect, shape of the defect and the proximity to free margins a V-Y flap was deemed most appropriate.  Using a sterile surgical marker, an appropriate advancement flap was drawn incorporating the defect and placing the expected incisions within the relaxed skin tension lines where possible.    The area thus outlined was incised deep to adipose tissue with a #15 scalpel blade.  The skin margins were undermined to an appropriate distance in all directions utilizing iris scissors.
V-Y Plasty Text: The defect edges were debeveled with a #15 scalpel blade.  Given the location of the defect, shape of the defect and the proximity to free margins an V-Y advancement flap was deemed most appropriate.  Using a sterile surgical marker, an appropriate advancement flap was drawn incorporating the defect and placing the expected incisions within the relaxed skin tension lines where possible.    The area thus outlined was incised deep to adipose tissue with a #15 scalpel blade.  The skin margins were undermined to an appropriate distance in all directions utilizing iris scissors.
W Plasty Text: The lesion was extirpated to the level of the fat with a #15 scalpel blade.  Given the location of the defect, shape of the defect and the proximity to free margins a W-plasty was deemed most appropriate for repair.  Using a sterile surgical marker, the appropriate transposition arms of the W-plasty were drawn incorporating the defect and placing the expected incisions within the relaxed skin tension lines where possible.    The area thus outlined was incised deep to adipose tissue with a #15 scalpel blade.  The skin margins were undermined to an appropriate distance in all directions utilizing iris scissors.  The opposing transposition arms were then transposed into place in opposite direction and anchored with interrupted buried subcutaneous sutures.
Z Plasty Text: The lesion was extirpated to the level of the fat with a #15 scalpel blade.  Given the location of the defect, shape of the defect and the proximity to free margins a Z-plasty was deemed most appropriate for repair.  Using a sterile surgical marker, the appropriate transposition arms of the Z-plasty were drawn incorporating the defect and placing the expected incisions within the relaxed skin tension lines where possible.    The area thus outlined was incised deep to adipose tissue with a #15 scalpel blade.  The skin margins were undermined to an appropriate distance in all directions utilizing iris scissors.  The opposing transposition arms were then transposed into place in opposite direction and anchored with interrupted buried subcutaneous sutures.
Zygomaticofacial Flap Text: Given the location of the defect, shape of the defect and the proximity to free margins a zygomaticofacial flap was deemed most appropriate for repair.  Using a sterile surgical marker, the appropriate flap was drawn incorporating the defect and placing the expected incisions within the relaxed skin tension lines where possible. The area thus outlined was incised deep to adipose tissue with a #15 scalpel blade with preservation of a vascular pedicle.  The skin margins were undermined to an appropriate distance in all directions utilizing iris scissors.  The flap was then placed into the defect and anchored with interrupted buried subcutaneous sutures.
Abbe Flap (Lower To Upper Lip) Text: The defect of the upper lip was assessed and measured.  Given the location and size of the defect, an Abbe flap was deemed most appropriate.  Using a sterile surgical marker, an appropriate Abbe flap was measured and drawn on the lower lip. Local anesthesia was then infiltrated. A scalpel was then used to incise the upper lip through and through the skin, vermilion, muscle and mucosa, leaving the flap pedicled on the opposite side.  The flap was then rotated and transferred to the lower lip defect.  The flap was then sutured into place with a three layer technique, closing the orbicularis oris muscle layer with subcutaneous buried sutures, followed by a mucosal layer and an epidermal layer.
Abbe Flap (Upper To Lower Lip) Text: The defect of the lower lip was assessed and measured.  Given the location and size of the defect, an Abbe flap was deemed most appropriate.  Using a sterile surgical marker, an appropriate Abbe flap was measured and drawn on the upper lip. Local anesthesia was then infiltrated.  A scalpel was then used to incise the upper lip through and through the skin, vermilion, muscle and mucosa, leaving the flap pedicled on the opposite side.  The flap was then rotated and transferred to the lower lip defect.  The flap was then sutured into place with a three layer technique, closing the orbicularis oris muscle layer with subcutaneous buried sutures, followed by a mucosal layer and an epidermal layer.
Cheek Interpolation Flap Text: A decision was made to reconstruct the defect utilizing an interpolation axial flap and a staged reconstruction.  A telfa template was made of the defect.  This telfa template was then used to outline the Cheek Interpolation flap.  The donor area for the pedicle flap was then injected with anesthesia.  The flap was excised through the skin and subcutaneous tissue down to the layer of the underlying musculature.  The interpolation flap was carefully excised within this deep plane to maintain its blood supply.  The edges of the donor site were undermined.   The donor site was closed in a primary fashion.  The pedicle was then rotated into position and sutured.  Once the tube was sutured into place, adequate blood supply was confirmed with blanching and refill.  The pedicle was then wrapped with xeroform gauze and dressed appropriately with a telfa and gauze bandage to ensure continued blood supply and protect the attached pedicle.
Cheek-To-Nose Interpolation Flap Text: A decision was made to reconstruct the defect utilizing an interpolation axial flap and a staged reconstruction.  A telfa template was made of the defect.  This telfa template was then used to outline the Cheek-To-Nose Interpolation flap.  The donor area for the pedicle flap was then injected with anesthesia.  The flap was excised through the skin and subcutaneous tissue down to the layer of the underlying musculature.  The interpolation flap was carefully excised within this deep plane to maintain its blood supply.  The edges of the donor site were undermined.   The donor site was closed in a primary fashion.  The pedicle was then rotated into position and sutured.  Once the tube was sutured into place, adequate blood supply was confirmed with blanching and refill.  The pedicle was then wrapped with xeroform gauze and dressed appropriately with a telfa and gauze bandage to ensure continued blood supply and protect the attached pedicle.
Estlander Flap (Lower To Upper Lip) Text: The defect of the lower lip was assessed and measured.  Given the location and size of the defect, an Estlander flap was deemed most appropriate.  Using a sterile surgical marker, an appropriate Estlander flap was measured and drawn on the upper lip. Local anesthesia was then infiltrated. A scalpel was then used to incise the lateral aspect of the flap, through skin, muscle and mucosa, leaving the flap pedicled medially.  The flap was then rotated and positioned to fill the lower lip defect.  The flap was then sutured into place with a three layer technique, closing the orbicularis oris muscle layer with subcutaneous buried sutures, followed by a mucosal layer and an epidermal layer.
Interpolation Flap Text: A decision was made to reconstruct the defect utilizing an interpolation axial flap and a staged reconstruction.  A telfa template was made of the defect.  This telfa template was then used to outline the interpolation flap.  The donor area for the pedicle flap was then injected with anesthesia.  The flap was excised through the skin and subcutaneous tissue down to the layer of the underlying musculature.  The interpolation flap was carefully excised within this deep plane to maintain its blood supply.  The edges of the donor site were undermined.   The donor site was closed in a primary fashion.  The pedicle was then rotated into position and sutured.  Once the tube was sutured into place, adequate blood supply was confirmed with blanching and refill.  The pedicle was then wrapped with xeroform gauze and dressed appropriately with a telfa and gauze bandage to ensure continued blood supply and protect the attached pedicle.
Melolabial Interpolation Flap Text: A decision was made to reconstruct the defect utilizing an interpolation axial flap and a staged reconstruction.  A telfa template was made of the defect.  This telfa template was then used to outline the melolabial interpolation flap.  The donor area for the pedicle flap was then injected with anesthesia.  The flap was excised through the skin and subcutaneous tissue down to the layer of the underlying musculature.  The pedicle flap was carefully excised within this deep plane to maintain its blood supply.  The edges of the donor site were undermined.   The donor site was closed in a primary fashion.  The pedicle was then rotated into position and sutured.  Once the tube was sutured into place, adequate blood supply was confirmed with blanching and refill.  The pedicle was then wrapped with xeroform gauze and dressed appropriately with a telfa and gauze bandage to ensure continued blood supply and protect the attached pedicle.
Mastoid Interpolation Flap Text: A decision was made to reconstruct the defect utilizing an interpolation axial flap and a staged reconstruction.  A telfa template was made of the defect.  This telfa template was then used to outline the mastoid interpolation flap.  The donor area for the pedicle flap was then injected with anesthesia.  The flap was excised through the skin and subcutaneous tissue down to the layer of the underlying musculature.  The pedicle flap was carefully excised within this deep plane to maintain its blood supply.  The edges of the donor site were undermined.   The donor site was closed in a primary fashion.  The pedicle was then rotated into position and sutured.  Once the tube was sutured into place, adequate blood supply was confirmed with blanching and refill.  The pedicle was then wrapped with xeroform gauze and dressed appropriately with a telfa and gauze bandage to ensure continued blood supply and protect the attached pedicle.
Paramedian Forehead Flap Text: A decision was made to reconstruct the defect utilizing an interpolation axial flap and a staged reconstruction.  A telfa template was made of the defect.  This telfa template was then used to outline the paramedian forehead pedicle flap.  The donor area for the pedicle flap was then injected with anesthesia.  The flap was excised through the skin and subcutaneous tissue down to the layer of the underlying musculature.  The pedicle flap was carefully excised within this deep plane to maintain its blood supply.  The edges of the donor site were undermined.   The donor site was closed in a primary fashion.  The pedicle was then rotated into position and sutured.  Once the tube was sutured into place, adequate blood supply was confirmed with blanching and refill.  The pedicle was then wrapped with xeroform gauze and dressed appropriately with a telfa and gauze bandage to ensure continued blood supply and protect the attached pedicle.
Posterior Auricular Interpolation Flap Text: A decision was made to reconstruct the defect utilizing an interpolation axial flap and a staged reconstruction.  A telfa template was made of the defect.  This telfa template was then used to outline the posterior auricular interpolation flap.  The donor area for the pedicle flap was then injected with anesthesia.  The flap was excised through the skin and subcutaneous tissue down to the layer of the underlying musculature.  The pedicle flap was carefully excised within this deep plane to maintain its blood supply.  The edges of the donor site were undermined.   The donor site was closed in a primary fashion.  The pedicle was then rotated into position and sutured.  Once the tube was sutured into place, adequate blood supply was confirmed with blanching and refill.  The pedicle was then wrapped with xeroform gauze and dressed appropriately with a telfa and gauze bandage to ensure continued blood supply and protect the attached pedicle.
Cheiloplasty (Complex) Text: A decision was made to reconstruct the defect with a  cheiloplasty.  The defect was undermined extensively.  Additional orbicularis oris muscle was excised with a 15 blade scalpel.  The defect was converted into a full thickness wedge to facilite a better cosmetic result.  Small vessels were then tied off with 5-0 monocyrl. The orbicularis oris, superficial fascia, adipose and dermis were then reapproximated.  After the deeper layers were approximated the epidermis was reapproximated with particular care given to realign the vermilion border.
Cheiloplasty (Less Than 50%) Text: A decision was made to reconstruct the defect with a  cheiloplasty.  The defect was undermined extensively.  Additional orbicularis oris muscle was excised with a 15 blade scalpel.  The defect was converted into a full thickness wedge, of less than 50% of the vertical height of the lip, to facilite a better cosmetic result.  Small vessels were then tied off with 5-0 monocyrl. The orbicularis oris, superficial fascia, adipose and dermis were then reapproximated.  After the deeper layers were approximated the epidermis was reapproximated with particular care given to realign the vermilion border.
Ear Wedge Repair Text: A wedge excision was completed by carrying down an excision through the full thickness of the ear and cartilage with an inward facing Burow's triangle. The wound was then closed in a layered fashion.
Full Thickness Lip Wedge Repair (Flap) Text: Given the location of the defect and the proximity to free margins a full thickness wedge repair was deemed most appropriate.  Using a sterile surgical marker, the appropriate repair was drawn incorporating the defect and placing the expected incisions perpendicular to the vermilion border.  The vermilion border was also meticulously outlined to ensure appropriate reapproximation during the repair.  The area thus outlined was incised through and through with a #15 scalpel blade.  The muscularis and dermis were reaproximated with deep sutures following hemostasis. Care was taken to realign the vermilion border before proceeding with the superficial closure.  Once the vermilion was realigned the superfical and mucosal closure was finished.
Burow's Graft Text: The defect edges were debeveled with a #15 scalpel blade.  Given the location of the defect, shape of the defect, the proximity to free margins and the presence of a standing cone deformity a Burow's skin graft was deemed most appropriate. The standing cone was removed and this tissue was then trimmed to the shape of the primary defect. The adipose tissue was also removed until only dermis and epidermis were left.  The skin margins of the secondary defect were undermined to an appropriate distance in all directions utilizing iris scissors.  The secondary defect was closed with interrupted buried subcutaneous sutures.  The skin edges were then re-apposed with running  sutures.  The skin graft was then placed in the primary defect and oriented appropriately.
Cartilage Graft Text: The defect edges were debeveled with a #15 scalpel blade.  Given the location of the defect, shape of the defect, the fact the defect involved a full thickness cartilage defect a cartilage graft was deemed most appropriate.  An appropriate donor site was identified, cleansed, and anesthetized. The cartilage graft was then harvested and transferred to the recipient site, oriented appropriately and then sutured into place.  The secondary defect was then repaired using a primary closure.
Composite Graft Text: The defect edges were debeveled with a #15 scalpel blade.  Given the location of the defect, shape of the defect, the proximity to free margins and the fact the defect was full thickness a composite graft was deemed most appropriate.  The defect was outline and then transferred to the donor site.  A full thickness graft was then excised from the donor site. The graft was then placed in the primary defect, oriented appropriately and then sutured into place.  The secondary defect was then repaired using a primary closure.
Epidermal Autograft Text: The defect edges were debeveled with a #15 scalpel blade.  Given the location of the defect, shape of the defect and the proximity to free margins an epidermal autograft was deemed most appropriate.  Using a sterile surgical marker, the primary defect shape was transferred to the donor site. The epidermal graft was then harvested.  The skin graft was then placed in the primary defect and oriented appropriately.
Dermal Autograft Text: The defect edges were debeveled with a #15 scalpel blade.  Given the location of the defect, shape of the defect and the proximity to free margins a dermal autograft was deemed most appropriate.  Using a sterile surgical marker, the primary defect shape was transferred to the donor site. The area thus outlined was incised deep to adipose tissue with a #15 scalpel blade.  The harvested graft was then trimmed of adipose and epidermal tissue until only dermis was left.  The skin graft was then placed in the primary defect and oriented appropriately.
Ftsg Text: The defect edges were debeveled with a #15 scalpel blade.  Given the location of the defect, shape of the defect and the proximity to free margins a full thickness skin graft was deemed most appropriate.  Using a sterile surgical marker, the primary defect shape was transferred to the donor site. The area thus outlined was incised deep to adipose tissue with a #15 scalpel blade.  The harvested graft was then trimmed of adipose tissue until only dermis and epidermis was left.  The skin margins of the secondary defect were undermined to an appropriate distance in all directions utilizing iris scissors.  The secondary defect was closed with interrupted buried subcutaneous sutures.  The skin edges were then re-apposed with running  sutures.  The skin graft was then placed in the primary defect and oriented appropriately.
Pinch Graft Text: The defect edges were debeveled with a #15 scalpel blade. Given the location of the defect, shape of the defect and the proximity to free margins a pinch graft was deemed most appropriate. Using a sterile surgical marker, the primary defect shape was transferred to the donor site. The area thus outlined was incised deep to adipose tissue with a #15 scalpel blade.  The harvested graft was then trimmed of adipose tissue until only dermis and epidermis was left. The skin margins of the secondary defect were undermined to an appropriate distance in all directions utilizing iris scissors.  The secondary defect was closed with interrupted buried subcutaneous sutures.  The skin edges were then re-apposed with running  sutures.  The skin graft was then placed in the primary defect and oriented appropriately.
Skin Substitute Text: The defect edges were debeveled with a #15 scalpel blade.  Given the location of the defect, shape of the defect and the proximity to free margins a skin substitute graft was deemed most appropriate.  The graft material was trimmed to fit the size of the defect. The graft was then placed in the primary defect and oriented appropriately.
Split-Thickness Skin Graft Text: The defect edges were debeveled with a #15 scalpel blade.  Given the location of the defect, shape of the defect and the proximity to free margins a split thickness skin graft was deemed most appropriate.  Using a sterile surgical marker, the primary defect shape was transferred to the donor site. The split thickness graft was then harvested.  The skin graft was then placed in the primary defect and oriented appropriately.
Tissue Cultured Epidermal Autograft Text: The defect edges were debeveled with a #15 scalpel blade.  Given the location of the defect, shape of the defect and the proximity to free margins a tissue cultured epidermal autograft was deemed most appropriate.  The graft was then trimmed to fit the size of the defect.  The graft was then placed in the primary defect and oriented appropriately.
Xenograft Text: The defect edges were debeveled with a #15 scalpel blade.  Given the location of the defect, shape of the defect and the proximity to free margins a xenograft was deemed most appropriate.  The graft was then trimmed to fit the size of the defect.  The graft was then placed in the primary defect and oriented appropriately.
Complex Repair And Flap Additional Text (Will Appearing After The Standard Complex Repair Text): The complex repair was not sufficient to completely close the primary defect. The remaining additional defect was repaired with the flap mentioned below.
Complex Repair And Graft Additional Text (Will Appearing After The Standard Complex Repair Text): The complex repair was not sufficient to completely close the primary defect. The remaining additional defect was repaired with the graft mentioned below.
Eyelid Full Thickness Repair - 50946: The eyelid defect was full thickness which required a wedge repair of the eyelid. Special care was taken to ensure that the eyelid margin was realligned when placing sutures.
Eyelid Partial Thickness Repair - [id number]: The eyelid defect was partial thickness which required a wedge repair of the eyelid. Special care was taken to ensure that the eyelid margin was realligned when placing sutures.
Intermediate Repair And Flap Additional Text (Will Appearing After The Standard Complex Repair Text): The intermediate repair was not sufficient to completely close the primary defect. The remaining additional defect was repaired with the flap mentioned below.
Intermediate Repair And Graft Additional Text (Will Appearing After The Standard Complex Repair Text): The intermediate repair was not sufficient to completely close the primary defect. The remaining additional defect was repaired with the graft mentioned below.
Localized Dermabrasion With 15 Blade Text: The patient was draped in routine manner.  Localized dermabrasion using a 15 blade was performed in routine manner to papillary dermis. This spot dermabrasion is being performed to complete skin cancer reconstruction. It also will eliminate the other sun damaged precancerous cells that are known to be part of the regional effect of a lifetime's worth of sun exposure. This localized dermabrasion is therapeutic and should not be considered cosmetic in any regard.
Localized Dermabrasion With Sand Papertext: The patient was draped in routine manner.  Localized dermabrasion using sterile sand paper was performed in routine manner to papillary dermis. This spot dermabrasion is being performed to complete skin cancer reconstruction. It also will eliminate the other sun damaged precancerous cells that are known to be part of the regional effect of a lifetime's worth of sun exposure. This localized dermabrasion is therapeutic and should not be considered cosmetic in any regard.
Localized Dermabrasion With Wire Brush Text: The patient was draped in routine manner.  Localized dermabrasion using 3 x 17 mm wire brush was performed in routine manner to papillary dermis. This spot dermabrasion is being performed to complete skin cancer reconstruction. It also will eliminate the other sun damaged precancerous cells that are known to be part of the regional effect of a lifetime's worth of sun exposure. This localized dermabrasion is therapeutic and should not be considered cosmetic in any regard.
Purse String (Simple) Text: Given the location of the defect and the characteristics of the surrounding skin a purse string closure was deemed most appropriate.  Undermining was performed circumfirentially around the surgical defect.  A purse string suture was then placed and tightened.
Purse String (Intermediate) Text: Given the location of the defect and the characteristics of the surrounding skin a purse string intermediate closure was deemed most appropriate.  Undermining was performed circumfirentially around the surgical defect.  A purse string suture was then placed and tightened.
Partial Purse String (Simple) Text: Given the location of the defect and the characteristics of the surrounding skin a simple purse string closure was deemed most appropriate.  Undermining was performed circumfirentially around the surgical defect.  A purse string suture was then placed and tightened. Wound tension only allowed a partial closure of the circular defect.
Partial Purse String (Intermediate) Text: Given the location of the defect and the characteristics of the surrounding skin an intermediate purse string closure was deemed most appropriate.  Undermining was performed circumfirentially around the surgical defect.  A purse string suture was then placed and tightened. Wound tension only allowed a partial closure of the circular defect.
Tarsorrhaphy Text: A tarsorrhaphy was performed using Frost sutures.
Manual Repair Warning Statement: We plan on removing the manually selected variable below in favor of our much easier automatic structured text blocks found in the previous tab. We decided to do this to help make the flow better and give you the full power of structured data. Manual selection is never going to be ideal in our platform and I would encourage you to avoid using manual selection from this point on, especially since I will be sunsetting this feature. It is important that you do one of two things with the customized text below. First, you can save all of the text in a word file so you can have it for future reference. Second, transfer the text to the appropriate area in the Library tab. Lastly, if there is a flap or graft type which we do not have you need to let us know right away so I can add it in before the variable is hidden. No need to panic, we plan to give you roughly 6 months to make the change.
Same Histology In Subsequent Stages Text: The pattern and morphology of the tumor is the same as noted in the previous stage.
No Residual Tumor Seen Histology Text: There were no malignant cells seen in the sections examined.
Inflammation Suggestive Of Cancer Camouflage Histology Text: There was a dense lymphocytic infiltrate which prevented adequate histologic evaluation of adjacent structures. An additional layer is indicated to rule out definitive tumor involvement at the surgical margin.
Incidental Actinic Keratosis Histology Text: Incidentally, partial thickness keratinocytic atypia was noted at the surgical margin on this section. Given lack of full-thickness atypia, we will cease Mohs surgery and plan for topical therapy once the wound is healed.
Missing Epidermis Histology Text: After multiple sections through the Mohs tissue block, the full peripheral epidermal margin was unable to be fully evaluated. An additional section was obtained to evaluate the epidermal margin.
Bcc Histology Text: Numerous aggregates of atypical basaloid cells with clefting, peripheral palisading and myxoid stroma
Bcc Infiltrative Histology Text: There were numerous aggregates of basaloid cells demonstrating an infiltrative pattern.
Bcc Superficial Histology Text: Atypical basaloid aggregates emanating from the epidermis with palisading and clefting
Scc Histology Text: Dermal nodules and aggregates of atypical squamous cells
Scc Well Differentiated Histology Text: Atypical aggregates of keratinizing squamous cells extending into the dermis
Scc In Situ Histology Text: Full thickness epidermal atypia with parakeratosis and loss of the granular layer
Scc In Situ With Follicular Extension Histology Text: Atypia of the infundibular epithelium noted
Mart-1 - Positive Histology Text: MART-1 staining demonstrates areas of higher density and clustering of melanocytes with Pagetoid spread upwards within the epidermis. The surgical margins are positive for tumor cells.
Mart-1 - Negative Histology Text: MART-1 staining demonstrates a normal density and pattern of melanocytes along the dermal-epidermal junction. The surgical margins are negative for tumor cells.
Information: Selecting Yes will display possible errors in your note based on the variables you have selected. This validation is only offered as a suggestion for you. PLEASE NOTE THAT THE VALIDATION TEXT WILL BE REMOVED WHEN YOU FINALIZE YOUR NOTE. IF YOU WANT TO FAX A PRELIMINARY NOTE YOU WILL NEED TO TOGGLE THIS TO 'NO' IF YOU DO NOT WANT IT IN YOUR FAXED NOTE.
Bill 59 Modifier?: No - Continue to Bill 79 Modifier

## 2024-10-11 ENCOUNTER — APPOINTMENT (RX ONLY)
Dept: URBAN - METROPOLITAN AREA CLINIC 170 | Facility: CLINIC | Age: 89
Setting detail: DERMATOLOGY
End: 2024-10-11

## 2024-10-11 DIAGNOSIS — Z48.817 ENCOUNTER FOR SURGICAL AFTERCARE FOLLOWING SURGERY ON THE SKIN AND SUBCUTANEOUS TISSUE: ICD-10-CM

## 2024-10-11 PROCEDURE — ? POST-OP WOUND CHECK

## 2024-10-11 PROCEDURE — ? TREATMENT REGIMEN

## 2024-10-11 ASSESSMENT — LOCATION ZONE DERM: LOCATION ZONE: SCALP

## 2024-10-11 ASSESSMENT — LOCATION SIMPLE DESCRIPTION DERM: LOCATION SIMPLE: SCALP

## 2024-10-11 ASSESSMENT — LOCATION DETAILED DESCRIPTION DERM: LOCATION DETAILED: LEFT SUPERIOR PARIETAL SCALP

## 2024-12-10 ENCOUNTER — APPOINTMENT (RX ONLY)
Dept: URBAN - METROPOLITAN AREA CLINIC 170 | Facility: CLINIC | Age: 89
Setting detail: DERMATOLOGY
End: 2024-12-10

## 2024-12-10 DIAGNOSIS — D49.2 NEOPLASM OF UNSPECIFIED BEHAVIOR OF BONE, SOFT TISSUE, AND SKIN: ICD-10-CM

## 2024-12-10 DIAGNOSIS — L57.8 OTHER SKIN CHANGES DUE TO CHRONIC EXPOSURE TO NONIONIZING RADIATION: ICD-10-CM

## 2024-12-10 PROCEDURE — 11102 TANGNTL BX SKIN SINGLE LES: CPT

## 2024-12-10 PROCEDURE — 99213 OFFICE O/P EST LOW 20 MIN: CPT | Mod: 25

## 2024-12-10 PROCEDURE — ? COUNSELING

## 2024-12-10 PROCEDURE — ? BIOPSY BY SHAVE METHOD

## 2024-12-10 PROCEDURE — 11103 TANGNTL BX SKIN EA SEP/ADDL: CPT

## 2024-12-10 ASSESSMENT — LOCATION SIMPLE DESCRIPTION DERM
LOCATION SIMPLE: LEFT SCALP
LOCATION SIMPLE: SCALP
LOCATION SIMPLE: RIGHT SCALP
LOCATION SIMPLE: RIGHT FOREHEAD

## 2024-12-10 ASSESSMENT — LOCATION ZONE DERM
LOCATION ZONE: SCALP
LOCATION ZONE: FACE

## 2024-12-10 ASSESSMENT — LOCATION DETAILED DESCRIPTION DERM
LOCATION DETAILED: RIGHT MEDIAL FRONTAL SCALP
LOCATION DETAILED: RIGHT SUPERIOR FOREHEAD
LOCATION DETAILED: RIGHT SUPERIOR PARIETAL SCALP
LOCATION DETAILED: LEFT CENTRAL FRONTAL SCALP

## 2024-12-10 NOTE — HPI: EVALUATION OF SKIN LESION(S)
What Type Of Note Output Would You Prefer (Optional)?: Bullet Format
How Severe Are Your Spot(S)?: mild
Have Your Spot(S) Been Treated In The Past?: has not been treated
Hpi Title: Evaluation of Skin Lesions
Additional History: Patient is here for focused evaluation of scalp and face. Patient does not have any concerns at this time

## 2024-12-10 NOTE — PROCEDURE: BIOPSY BY SHAVE METHOD

## 2025-01-31 ENCOUNTER — APPOINTMENT (OUTPATIENT)
Dept: URBAN - METROPOLITAN AREA CLINIC 170 | Facility: CLINIC | Age: 89
Setting detail: DERMATOLOGY
End: 2025-01-31

## 2025-01-31 PROBLEM — D04.4 CARCINOMA IN SITU OF SKIN OF SCALP AND NECK: Status: ACTIVE | Noted: 2025-01-31

## 2025-01-31 PROBLEM — C44.329 SQUAMOUS CELL CARCINOMA OF SKIN OF OTHER PARTS OF FACE: Status: ACTIVE | Noted: 2025-01-31

## 2025-01-31 PROCEDURE — 12052 INTMD RPR FACE/MM 2.6-5.0 CM: CPT

## 2025-01-31 PROCEDURE — ? MOHS SURGERY

## 2025-01-31 PROCEDURE — ? CONSULTATION FOR MOHS SURGERY

## 2025-01-31 PROCEDURE — 17311 MOHS 1 STAGE H/N/HF/G: CPT

## 2025-01-31 PROCEDURE — 99214 OFFICE O/P EST MOD 30 MIN: CPT | Mod: 25

## 2025-01-31 PROCEDURE — ? PRESCRIPTION

## 2025-01-31 RX ORDER — FLUOROURACIL 2 G/40G
CREAM TOPICAL
Qty: 40 | Refills: 2 | Status: ERX | COMMUNITY
Start: 2025-01-31

## 2025-01-31 RX ADMIN — FLUOROURACIL: 2 CREAM TOPICAL at 00:00

## 2025-01-31 NOTE — PROCEDURE: CONSULTATION FOR MOHS SURGERY
Detail Level: Detailed
X Size Of Lesion In Cm (Optional): 0
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
Incorporate Mauc In Note: Yes

## 2025-01-31 NOTE — PROCEDURE: MOHS SURGERY
Mohs Case Number: 
Date Of Previous Biopsy (Optional): 12/10/24
Previous Accession (Optional): [id number]
Biopsy Photograph Reviewed: Yes
Referring Physician (Optional): Margaret Hubbard MD
Consent Type: Consent 1 (Standard)
Eye Shield Used: No
Surgeon Performing Repair (Optional): Momo Augustin
Initial Size Of Lesion: 0.8
X Size Of Lesion In Cm (Optional): 0.6
Number Of Stages: 1
Primary Defect Length In Cm (Final Defect Size - Required For Flaps/Grafts): 1.1
Primary Defect Width In Cm (Final Defect Size - Required For Flaps/Grafts): 0.9
Primary Defect Depth In Cm (Optional But Required For Some Insurers): 0
Repair Type: Intermediate Layered Repair
Which Instrument Did You Use For Dermabrasion?: Wire Brush
Which Eyelid Repair Cpt Are You Using?: 99156
Oculoplastic Surgeon Procedure Text (A): After obtaining clear surgical margins the patient was sent to oculoplastics for surgical repair.  The patient understands they will receive post-surgical care and follow-up from the referring physician's office.
Otolaryngologist Procedure Text (A): After obtaining clear surgical margins the patient was sent to otolaryngology for surgical repair.  The patient understands they will receive post-surgical care and follow-up from the referring physician's office.
Plastic Surgeon Procedure Text (A): After obtaining clear surgical margins the patient was sent to plastics for surgical repair.  The patient understands they will receive post-surgical care and follow-up from the referring physician's office.
Mid-Level Procedure Text (A): After obtaining clear surgical margins the patient was sent to a mid-level provider for surgical repair.  The patient understands they will receive post-surgical care and follow-up from the mid-level provider.
Provider Procedure Text (A): After obtaining clear surgical margins the defect was repaired by another provider.
Asc Procedure Text (A): After obtaining clear surgical margins the patient was sent to an ASC for surgical repair.  The patient understands they will receive post-surgical care and follow-up from the ASC physician.
Simple / Intermediate / Complex Repair - Final Wound Length In Cm: 3.3
Deep Sutures: 4-0 Vicryl
Epidermal Sutures: 5-0 Prolene
Suture Removal: 7 days
Suturegard Retention Suture: 2-0 Nylon
Retention Suture Bite Size: 3 mm
Length To Time In Minutes Device Was In Place: 10
Undermining Type: Entire Wound
Debridement Text: The wound edges were debrided prior to proceeding with the closure to facilitate wound healing.
Helical Rim Text: The closure involved the helical rim.
Vermilion Border Text: The closure involved the vermilion border.
Nostril Rim Text: The closure involved the nostril rim.
Retention Suture Text: Retention sutures were placed to support the closure and prevent dehiscence.
Location Indication Override (Is Already Calculated Based On Selected Body Location): Area M
Area H Indication Text: Tumor located in Area H (eyelids, eyebrows, nose, lips, chin, ear, pre-auricular, post-auricular, temple, genitalia, hands, feet, ankles and areola).  Tissue conservation and complete peripheral and deep margin assessment are critical in these anatomic locations.
Area M Indication Text: Tumor located in Area M (cheek, forehead, scalp, neck, jawline and pretibial skin).  Mohs surgery is indicated for tumors in these anatomic locations.
Area L Indication Text: Tumor is located in Area L (trunk and extremities).  Mohs surgery is indicated for larger tumors, or tumors with aggressive histologic features, in these anatomic locations.
Depth Of Tumor Invasion (For Histology): tumor not visualized
Perineural Invasion (For Histology - Be Specific If Possible): absent
Special Stains Stage 1 - Results: Base On Clearance Noted Above
Stage 2: Additional Anesthesia Type: 1% lidocaine with epinephrine
Staging Info: By selecting yes to the question above you will include information on AJCC 8 tumor staging in your Mohs note. Information on tumor staging will be automatically added for SCCs on the head and neck. AJCC 8 includes tumor size, tumor depth, perineural involvement and bone invasion.
Tumor Depth: Less than 6mm from granular layer and no invasion beyond the subcutaneous fat
Why Was The Change Made?: Please Select the Appropriate Response
Medical Necessity Statement: Based on my clinical judgement, Mohs surgery is the most appropriate treatment for this cancer compared to other treatments.
Alternatives Discussed Intro (Do Not Add Period): I discussed alternative treatments to Mohs surgery and specifically discussed the risks and benefits of
Consent 1/Introductory Paragraph: Mohs surgery was explained to the patient and consent was obtained. The risks, benefits and alternatives to therapy were discussed in detail. Specifically, the risks of infection, scarring, bleeding, prolonged wound healing, incomplete removal, allergy to anesthesia or topical antimicrobials, sensory or motor nerve injury, and recurrence were addressed. Prior to the procedure, the treatment site was clearly identified and confirmed by the patient. All components of Universal Protocol/PAUSE Rule completed.
Consent 2/Introductory Paragraph: Mohs surgery was explained to the patient and consent was obtained. The risks, benefits and alternatives to therapy were discussed in detail. Specifically, the risks of infection, scarring, bleeding, prolonged wound healing, incomplete removal, allergy to anesthetic, cleansing solutions, or medication, nerve injury and recurrence were addressed. Prior to the procedure, the treatment site was clearly identified and confirmed by the patient. All components of Universal Protocol/PAUSE Rule completed.
Consent 3/Introductory Paragraph: I gave the patient a chance to ask questions they had about the procedure.  Following this I explained the Mohs procedure and consent was obtained. The risks, benefits and alternatives to therapy were discussed in detail. Specifically, the risks of infection, scarring, bleeding, prolonged wound healing, incomplete removal, allergy to anesthesia, nerve injury and recurrence were addressed. Prior to the procedure, the treatment site was clearly identified and confirmed by the patient. All components of Universal Protocol/PAUSE Rule completed.
Consent (Temporal Branch)/Introductory Paragraph: The rationale for Mohs was explained to the patient and consent was obtained. The risks, benefits and alternatives to therapy were discussed in detail. Specifically, the risks of damage to the temporal branch of the facial nerve, infection, scarring, bleeding, prolonged wound healing, incomplete removal, allergy to anesthesia, and recurrence were addressed. Prior to the procedure, the treatment site was clearly identified and confirmed by the patient. All components of Universal Protocol/PAUSE Rule completed.
Consent (Marginal Mandibular)/Introductory Paragraph: The rationale for Mohs was explained to the patient and consent was obtained. The risks, benefits and alternatives to therapy were discussed in detail. Specifically, the risks of damage to the marginal mandibular branch of the facial nerve, infection, scarring, bleeding, prolonged wound healing, incomplete removal, allergy to anesthesia, and recurrence were addressed. Prior to the procedure, the treatment site was clearly identified and confirmed by the patient. All components of Universal Protocol/PAUSE Rule completed.
Consent (Spinal Accessory)/Introductory Paragraph: The rationale for Mohs was explained to the patient and consent was obtained. The risks, benefits and alternatives to therapy were discussed in detail. Specifically, the risks of damage to the spinal accessory nerve, infection, scarring, bleeding, prolonged wound healing, incomplete removal, allergy to anesthesia, and recurrence were addressed. Prior to the procedure, the treatment site was clearly identified and confirmed by the patient. All components of Universal Protocol/PAUSE Rule completed.
Consent (Near Eyelid Margin)/Introductory Paragraph: The rationale for Mohs was explained to the patient and consent was obtained. The risks, benefits and alternatives to therapy were discussed in detail. Specifically, the risks of ectropion or eyelid deformity, infection, scarring, bleeding, prolonged wound healing, incomplete removal, allergy to anesthesia, nerve injury and recurrence were addressed. Prior to the procedure, the treatment site was clearly identified and confirmed by the patient. All components of Universal Protocol/PAUSE Rule completed.
Consent (Ear)/Introductory Paragraph: The rationale for Mohs was explained to the patient and consent was obtained. The risks, benefits and alternatives to therapy were discussed in detail. Specifically, the risks of ear deformity, infection, scarring, bleeding, prolonged wound healing, incomplete removal, allergy to anesthesia, nerve injury and recurrence were addressed. Prior to the procedure, the treatment site was clearly identified and confirmed by the patient. All components of Universal Protocol/PAUSE Rule completed.
Consent (Nose)/Introductory Paragraph: The rationale for Mohs was explained to the patient and consent was obtained. The risks, benefits and alternatives to therapy were discussed in detail. Specifically, the risks of nasal deformity, changes in the flow of air through the nose, infection, scarring, bleeding, prolonged wound healing, incomplete removal, allergy to anesthesia, nerve injury and recurrence were addressed. Prior to the procedure, the treatment site was clearly identified and confirmed by the patient. All components of Universal Protocol/PAUSE Rule completed.
Consent (Lip)/Introductory Paragraph: The rationale for Mohs was explained to the patient and consent was obtained. The risks, benefits and alternatives to therapy were discussed in detail. Specifically, the risks of lip deformity, changes in the oral aperture, infection, scarring, bleeding, prolonged wound healing, incomplete removal, allergy to anesthesia, nerve injury and recurrence were addressed. Prior to the procedure, the treatment site was clearly identified and confirmed by the patient. All components of Universal Protocol/PAUSE Rule completed.
Consent (Scalp)/Introductory Paragraph: The rationale for Mohs was explained to the patient and consent was obtained. The risks, benefits and alternatives to therapy were discussed in detail. Specifically, the risks of changes in hair growth pattern secondary to repair, infection, scarring, bleeding, prolonged wound healing, incomplete removal, allergy to anesthesia, nerve injury and recurrence were addressed. Prior to the procedure, the treatment site was clearly identified and confirmed by the patient. All components of Universal Protocol/PAUSE Rule completed.
Detail Level: Detailed
Postop Diagnosis: same
Anesthesia Type: 1% lidocaine with epinephrine and a 1:10 solution of 8.4% sodium bicarbonate
Anesthesia Volume In Cc: 5.5
Hemostasis: Electrodesiccation
Estimated Blood Loss (Cc): minimal
Repair Anesthesia Method: local infiltration
Brow Lift Text: A midfrontal incision was made medially to the defect to allow access to the tissues just superior to the left eyebrow. Following careful dissection inferiorly in a supraperiosteal plane to the level of the left eyebrow, several 3-0 monocryl sutures were used to resuspend the eyebrow orbicularis oculi muscular unit to the superior frontal bone periosteum. This resulted in an appropriate reapproximation of static eyebrow symmetry and correction of the left brow ptosis.
Epidermal Closure: running
Suturegard Intro: Intraoperative tissue expansion was performed, utilizing the SUTUREGARD device, in order to reduce wound tension.
Suturegard Body: The suture ends were repeatedly re-tightened and re-clamped to achieve the desired tissue expansion.
Hemigard Intro: Due to skin fragility and wound tension, it was decided to use HEMIGARD adhesive retention suture devices to permit a linear closure. The skin was cleaned and dried for a 6cm distance away from the wound. Excessive hair, if present, was removed to allow for adhesion.
Hemigard Postcare Instructions: The HEMIGARD strips are to remain completely dry for at least 5-7 days.
Donor Site Anesthesia Type: same as repair anesthesia
Epidermal Closure Graft Donor Site (Optional): simple interrupted
Closure 2 Information: This tab is for additional flaps and grafts, including complex repair and grafts and complex repair and flaps. You can also specify a different location for the additional defect, if the location is the same you do not need to select a new one. We will insert the automated text for the repair you select below just as we do for solitary flaps and grafts. Please note that at this time if you select a location with a different insurance zone you will need to override the ICD10 and CPT if appropriate.
Closure 3 Information: This tab is for additional flaps and grafts above and beyond our usual structured repairs.  Please note if you enter information here it will not currently bill and you will need to add the billing information manually.
Wound Care: Petrolatum
Dressing: pressure dressing with telfa
Dressing (No Sutures): dry sterile dressing
Unna Boot Text: An Unna boot was placed to help immobilize the limb and facilitate more rapid healing.
Home Suture Removal Text: Patient was provided instructions on removing sutures and will remove their sutures at home.  If they have any questions or difficulties they will call the office.
Post-Care Instructions: Written post-operative would care instructions were provided and reviewed with the patient. Patient is not to engage in any heavy lifting, exercise, or swimming for the next 7-14 days. Should the patient develop any fevers, chills, bleeding, severe pain patient will contact the office immediately.
Pain Refusal Text: I offered to prescribe pain medication but the patient refused to take this medication.
Mauc Instructions: By selecting yes to the question below the MAUC number will be added into the note.  This will be calculated automatically based on the diagnosis chosen, the size entered, the body zone selected (H,M,L) and the specific indications you chose. You will also have the option to override the Mohs AUC if you disagree with the automatically calculated number and this option is found in the Case Summary tab.
Where Do You Want The Question To Include Opioid Counseling Located?: Case Summary Tab
Eye Protection Verbiage: Before proceeding with the stage, a plastic scleral shield was inserted. The globe was anesthetized with a few drops of 1% lidocaine with 1:100,000 epinephrine. Then, an appropriate sized scleral shield was chosen and coated with lacrilube ointment. The shield was gently inserted and left in place for the duration of each stage. After the stage was completed, the shield was gently removed.
Mohs Method Verbiage: A full thickness incision was made surrounding the clinically apparent tumor margins. Superficial orienting hashes were made, and the specimen was harvested as a microscopic controlled layer.
Surgeon/Pathologist Verbiage (Will Incorporate Name Of Surgeon From Intro If Not Blank): operated in two distinct and integrated capacities as the surgeon and pathologist.
Mohs Histo Method Verbiage: Each section was then chromacoded and processed in the Mohs lab using the Mohs protocol and submitted for frozen section.
Subsequent Stages Histo Method Verbiage: Using a similar technique to that described above, a thin layer of tissue was removed from all areas where tumor was visible on the previous stage.  The tissue was again oriented, mapped, dyed, and processed as above.
Mohs Rapid Report Verbiage: The area of clinically evident tumor was marked with skin marking ink and appropriately hatched.  The initial incision was made following the Mohs approach through the skin.  The specimen was taken to the lab, divided into the necessary number of pieces, chromacoded and processed according to the Mohs protocol.  This was repeated in successive stages until a tumor free defect was achieved.
Complex Repair Preamble Text (Leave Blank If You Do Not Want): Extensive wide undermining was performed.
Intermediate Repair Preamble Text (Leave Blank If You Do Not Want): Undermining was performed.
Crescentic Intermediate Repair Preamble Text (Leave Blank If You Do Not Want): Undermining was performed with blunt dissection.
Graft Cartilage Fenestration Text: The cartilage was fenestrated with a 2mm punch biopsy to help facilitate graft survival and healing.
Non-Graft Cartilage Fenestration Text: The cartilage was fenestrated with a 2mm punch biopsy to help facilitate healing.
Secondary Intention Text (Leave Blank If You Do Not Want): The defect will heal with secondary intention.
No Repair - Repaired With Adjacent Surgical Defect Text (Leave Blank If You Do Not Want): After obtaining clear surgical margins the defect was repaired concurrently with another surgical defect which was in close approximation.
Adjacent Tissue Transfer Text: The defect edges were debeveled with a #15 scalpel blade.  Given the location of the defect and the proximity to free margins an adjacent tissue transfer was deemed most appropriate.  Using a sterile surgical marker, an appropriate flap was drawn incorporating the defect and placing the expected incisions within the relaxed skin tension lines where possible.    The area thus outlined was incised deep to adipose tissue with a #15 scalpel blade.  The skin margins were undermined to an appropriate distance in all directions utilizing iris scissors.
Advancement Flap (Single) Text: The defect edges were debeveled with a #15 scalpel blade.  Given the location of the defect and the proximity to free margins a single advancement flap was deemed most appropriate.  Using a sterile surgical marker, an appropriate advancement flap was drawn incorporating the defect and placing the expected incisions within the relaxed skin tension lines where possible.    The area thus outlined was incised deep to adipose tissue with a #15 scalpel blade.  The skin margins were undermined to an appropriate distance in all directions utilizing iris scissors.
Advancement Flap (Double) Text: The defect edges were debeveled with a #15 scalpel blade.  Given the location of the defect and the proximity to free margins a double advancement flap was deemed most appropriate.  Using a sterile surgical marker, the appropriate advancement flaps were drawn incorporating the defect and placing the expected incisions within the relaxed skin tension lines where possible.    The area thus outlined was incised deep to adipose tissue with a #15 scalpel blade.  The skin margins were undermined to an appropriate distance in all directions utilizing iris scissors.
Advancement-Rotation Flap Text: The defect edges were debeveled with a #15 scalpel blade.  Given the location of the defect, shape of the defect and the proximity to free margins an advancement-rotation flap was deemed most appropriate.  Using a sterile surgical marker, an appropriate flap was drawn incorporating the defect and placing the expected incisions within the relaxed skin tension lines where possible. The area thus outlined was incised deep to adipose tissue with a #15 scalpel blade.  The skin margins were undermined to an appropriate distance in all directions utilizing iris scissors.
Alar Island Pedicle Flap Text: The defect edges were debeveled with a #15 scalpel blade.  Given the location of the defect, shape of the defect and the proximity to the alar rim an island pedicle advancement flap was deemed most appropriate.  Using a sterile surgical marker, an appropriate advancement flap was drawn incorporating the defect, outlining the appropriate donor tissue and placing the expected incisions within the nasal ala running parallel to the alar rim. The area thus outlined was incised with a #15 scalpel blade.  The skin margins were undermined minimally to an appropriate distance in all directions around the primary defect and laterally outward around the island pedicle utilizing iris scissors.  There was minimal undermining beneath the pedicle flap.
A-T Advancement Flap Text: The defect edges were debeveled with a #15 scalpel blade.  Given the location of the defect, shape of the defect and the proximity to free margins an A-T advancement flap was deemed most appropriate.  Using a sterile surgical marker, an appropriate advancement flap was drawn incorporating the defect and placing the expected incisions within the relaxed skin tension lines where possible.    The area thus outlined was incised deep to adipose tissue with a #15 scalpel blade.  The skin margins were undermined to an appropriate distance in all directions utilizing iris scissors.
Banner Transposition Flap Text: The defect edges were debeveled with a #15 scalpel blade.  Given the location of the defect and the proximity to free margins a Banner transposition flap was deemed most appropriate.  Using a sterile surgical marker, an appropriate flap drawn around the defect. The area thus outlined was incised deep to adipose tissue with a #15 scalpel blade.  The skin margins were undermined to an appropriate distance in all directions utilizing iris scissors.
Bilateral Helical Rim Advancement Flap Text: The defect edges were debeveled with a #15 blade scalpel.  Given the location of the defect and the proximity to free margins (helical rim) a bilateral helical rim advancement flap was deemed most appropriate.  Using a sterile surgical marker, the appropriate advancement flaps were drawn incorporating the defect and placing the expected incisions between the helical rim and antihelix where possible.  The area thus outlined was incised through and through with a #15 scalpel blade.  With a skin hook and iris scissors, the flaps were gently and sharply undermined and freed up.
Bilateral Rotation Flap Text: The defect edges were debeveled with a #15 scalpel blade. Given the location of the defect, shape of the defect and the proximity to free margins a bilateral rotation flap was deemed most appropriate. Using a sterile surgical marker, an appropriate rotation flap was drawn incorporating the defect and placing the expected incisions within the relaxed skin tension lines where possible. The area thus outlined was incised deep to adipose tissue with a #15 scalpel blade. The skin margins were undermined to an appropriate distance in all directions utilizing iris scissors. Following this, the designed flap was carried over into the primary defect and sutured into place.
Bilobed Flap Text: The defect edges were debeveled with a #15 scalpel blade.  Given the location of the defect and the proximity to free margins a bilobe flap was deemed most appropriate.  Using a sterile surgical marker, an appropriate bilobe flap drawn around the defect.    The area thus outlined was incised deep to adipose tissue with a #15 scalpel blade.  The skin margins were undermined to an appropriate distance in all directions utilizing iris scissors.
Bilobed Transposition Flap Text: The defect edges were debeveled with a #15 scalpel blade.  Given the location of the defect and the proximity to free margins a bilobed transposition flap was deemed most appropriate.  Using a sterile surgical marker, an appropriate bilobe flap drawn around the defect.    The area thus outlined was incised deep to adipose tissue with a #15 scalpel blade.  The skin margins were undermined to an appropriate distance in all directions utilizing iris scissors.
Bi-Rhombic Flap Text: The defect edges were debeveled with a #15 scalpel blade.  Given the location of the defect and the proximity to free margins a bi-rhombic flap was deemed most appropriate.  Using a sterile surgical marker, an appropriate rhombic flap was drawn incorporating the defect. The area thus outlined was incised deep to adipose tissue with a #15 scalpel blade.  The skin margins were undermined to an appropriate distance in all directions utilizing iris scissors.
Burow's Advancement Flap Text: The defect edges were debeveled with a #15 scalpel blade.  Given the location of the defect and the proximity to free margins a Burow's advancement flap was deemed most appropriate.  Using a sterile surgical marker, the appropriate advancement flap was drawn incorporating the defect and placing the expected incisions within the relaxed skin tension lines where possible.    The area thus outlined was incised deep to adipose tissue with a #15 scalpel blade.  The skin margins were undermined to an appropriate distance in all directions utilizing iris scissors.
Chonodrocutaneous Helical Advancement Flap Text: The defect edges were debeveled with a #15 scalpel blade.  Given the location of the defect and the proximity to free margins a chondrocutaneous helical advancement flap was deemed most appropriate.  Using a sterile surgical marker, the appropriate advancement flap was drawn incorporating the defect and placing the expected incisions within the relaxed skin tension lines where possible.    The area thus outlined was incised deep to adipose tissue with a #15 scalpel blade.  The skin margins were undermined to an appropriate distance in all directions utilizing iris scissors.
Crescentic Advancement Flap Text: The defect edges were debeveled with a #15 scalpel blade.  Given the location of the defect and the proximity to free margins a crescentic advancement flap was deemed most appropriate.  Using a sterile surgical marker, the appropriate advancement flap was drawn incorporating the defect and placing the expected incisions within the relaxed skin tension lines where possible.    The area thus outlined was incised deep to adipose tissue with a #15 scalpel blade.  The skin margins were undermined to an appropriate distance in all directions utilizing iris scissors.
Dorsal Nasal Flap Text: The defect edges were debeveled with a #15 scalpel blade.  Given the location of the defect and the proximity to free margins a dorsal nasal flap was deemed most appropriate.  Using a sterile surgical marker, an appropriate dorsal nasal flap was drawn around the defect.    The area thus outlined was incised deep to adipose tissue with a #15 scalpel blade.  The skin margins were undermined to an appropriate distance in all directions utilizing iris scissors.
Double Island Pedicle Flap Text: The defect edges were debeveled with a #15 scalpel blade.  Given the location of the defect, shape of the defect and the proximity to free margins a double island pedicle advancement flap was deemed most appropriate.  Using a sterile surgical marker, an appropriate advancement flap was drawn incorporating the defect, outlining the appropriate donor tissue and placing the expected incisions within the relaxed skin tension lines where possible.    The area thus outlined was incised deep to adipose tissue with a #15 scalpel blade.  The skin margins were undermined to an appropriate distance in all directions around the primary defect and laterally outward around the island pedicle utilizing iris scissors.  There was minimal undermining beneath the pedicle flap.
Double O-Z Flap Text: The defect edges were debeveled with a #15 scalpel blade.  Given the location of the defect, shape of the defect and the proximity to free margins a Double O-Z flap was deemed most appropriate.  Using a sterile surgical marker, an appropriate transposition flap was drawn incorporating the defect and placing the expected incisions within the relaxed skin tension lines where possible. The area thus outlined was incised deep to adipose tissue with a #15 scalpel blade.  The skin margins were undermined to an appropriate distance in all directions utilizing iris scissors.
Double O-Z Plasty Text: The defect edges were debeveled with a #15 scalpel blade.  Given the location of the defect, shape of the defect and the proximity to free margins a Double O-Z plasty (double transposition flap) was deemed most appropriate.  Using a sterile surgical marker, the appropriate transposition flaps were drawn incorporating the defect and placing the expected incisions within the relaxed skin tension lines where possible. The area thus outlined was incised deep to adipose tissue with a #15 scalpel blade.  The skin margins were undermined to an appropriate distance in all directions utilizing iris scissors.  Hemostasis was achieved with electrocautery.  The flaps were then transposed into place, one clockwise and the other counterclockwise, and anchored with interrupted buried subcutaneous sutures.
Double Z Plasty Text: The lesion was extirpated to the level of the fat with a #15 scalpel blade. Given the location of the defect, shape of the defect and the proximity to free margins a double Z-plasty was deemed most appropriate for repair. Using a sterile surgical marker, the appropriate transposition arms of the double Z-plasty were drawn incorporating the defect and placing the expected incisions within the relaxed skin tension lines where possible. The area thus outlined was incised deep to adipose tissue with a #15 scalpel blade. The skin margins were undermined to an appropriate distance in all directions utilizing iris scissors. The opposing transposition arms were then transposed and carried over into place in opposite direction and anchored with interrupted buried subcutaneous sutures.
Ear Star Wedge Flap Text: The defect edges were debeveled with a #15 blade scalpel.  Given the location of the defect and the proximity to free margins (helical rim) an ear star wedge flap was deemed most appropriate.  Using a sterile surgical marker, the appropriate flap was drawn incorporating the defect and placing the expected incisions between the helical rim and antihelix where possible.  The area thus outlined was incised through and through with a #15 scalpel blade.
Flip-Flop Flap Text: The defect edges were debeveled with a #15 blade scalpel.  Given the location of the defect and the proximity to free margins a flip-flop flap was deemed most appropriate. Using a sterile surgical marker, the appropriate flap was drawn incorporating the defect and placing the expected incisions between the helical rim and antihelix where possible.  The area thus outlined was incised through and through with a #15 scalpel blade. Following this, the designed flap was carried over into the primary defect and sutured into place.
Hatchet Flap Text: The defect edges were debeveled with a #15 scalpel blade.  Given the location of the defect, shape of the defect and the proximity to free margins a hatchet flap was deemed most appropriate.  Using a sterile surgical marker, an appropriate hatchet flap was drawn incorporating the defect and placing the expected incisions within the relaxed skin tension lines where possible.    The area thus outlined was incised deep to adipose tissue with a #15 scalpel blade.  The skin margins were undermined to an appropriate distance in all directions utilizing iris scissors.
Helical Rim Advancement Flap Text: The defect edges were debeveled with a #15 blade scalpel.  Given the location of the defect and the proximity to free margins (helical rim) a double helical rim advancement flap was deemed most appropriate.  Using a sterile surgical marker, the appropriate advancement flaps were drawn incorporating the defect and placing the expected incisions between the helical rim and antihelix where possible.  The area thus outlined was incised through and through with a #15 scalpel blade.  With a skin hook and iris scissors, the flaps were gently and sharply undermined and freed up.
H Plasty Text: Given the location of the defect, shape of the defect and the proximity to free margins a H-plasty was deemed most appropriate for repair.  Using a sterile surgical marker, the appropriate advancement arms of the H-plasty were drawn incorporating the defect and placing the expected incisions within the relaxed skin tension lines where possible. The area thus outlined was incised deep to adipose tissue with a #15 scalpel blade. The skin margins were undermined to an appropriate distance in all directions utilizing iris scissors.  The opposing advancement arms were then advanced into place in opposite direction and anchored with interrupted buried subcutaneous sutures.
Island Pedicle Flap Text: The defect edges were debeveled with a #15 scalpel blade.  Given the location of the defect, shape of the defect and the proximity to free margins an island pedicle advancement flap was deemed most appropriate.  Using a sterile surgical marker, an appropriate advancement flap was drawn incorporating the defect, outlining the appropriate donor tissue and placing the expected incisions within the relaxed skin tension lines where possible.    The area thus outlined was incised deep to adipose tissue with a #15 scalpel blade.  The skin margins were undermined to an appropriate distance in all directions around the primary defect and laterally outward around the island pedicle utilizing iris scissors.  There was minimal undermining beneath the pedicle flap.
Island Pedicle Flap With Canthal Suspension Text: The defect edges were debeveled with a #15 scalpel blade.  Given the location of the defect, shape of the defect and the proximity to free margins an island pedicle advancement flap was deemed most appropriate.  Using a sterile surgical marker, an appropriate advancement flap was drawn incorporating the defect, outlining the appropriate donor tissue and placing the expected incisions within the relaxed skin tension lines where possible. The area thus outlined was incised deep to adipose tissue with a #15 scalpel blade.  The skin margins were undermined to an appropriate distance in all directions around the primary defect and laterally outward around the island pedicle utilizing iris scissors.  There was minimal undermining beneath the pedicle flap. A suspension suture was placed in the canthal tendon to prevent tension and prevent ectropion.
Island Pedicle Flap-Requiring Vessel Identification Text: The defect edges were debeveled with a #15 scalpel blade.  Given the location of the defect, shape of the defect and the proximity to free margins an island pedicle advancement flap was deemed most appropriate.  Using a sterile surgical marker, an appropriate advancement flap was drawn, based on the axial vessel mentioned above, incorporating the defect, outlining the appropriate donor tissue and placing the expected incisions within the relaxed skin tension lines where possible.    The area thus outlined was incised deep to adipose tissue with a #15 scalpel blade.  The skin margins were undermined to an appropriate distance in all directions around the primary defect and laterally outward around the island pedicle utilizing iris scissors.  There was minimal undermining beneath the pedicle flap.
Keystone Flap Text: The defect edges were debeveled with a #15 scalpel blade.  Given the location of the defect, shape of the defect a keystone flap was deemed most appropriate.  Using a sterile surgical marker, an appropriate keystone flap was drawn incorporating the defect, outlining the appropriate donor tissue and placing the expected incisions within the relaxed skin tension lines where possible. The area thus outlined was incised deep to adipose tissue with a #15 scalpel blade.  The skin margins were undermined to an appropriate distance in all directions around the primary defect and laterally outward around the flap utilizing iris scissors.
Melolabial Transposition Flap Text: The defect edges were debeveled with a #15 scalpel blade.  Given the location of the defect and the proximity to free margins a melolabial flap was deemed most appropriate.  Using a sterile surgical marker, an appropriate melolabial transposition flap was drawn incorporating the defect.    The area thus outlined was incised deep to adipose tissue with a #15 scalpel blade.  The skin margins were undermined to an appropriate distance in all directions utilizing iris scissors.
Mercedes Flap Text: The defect edges were debeveled with a #15 scalpel blade.  Given the location of the defect, shape of the defect and the proximity to free margins a Mercedes flap was deemed most appropriate.  Using a sterile surgical marker, an appropriate advancement flap was drawn incorporating the defect and placing the expected incisions within the relaxed skin tension lines where possible. The area thus outlined was incised deep to adipose tissue with a #15 scalpel blade.  The skin margins were undermined to an appropriate distance in all directions utilizing iris scissors.
Modified Advancement Flap Text: The defect edges were debeveled with a #15 scalpel blade.  Given the location of the defect, shape of the defect and the proximity to free margins a modified advancement flap was deemed most appropriate.  Using a sterile surgical marker, an appropriate advancement flap was drawn incorporating the defect and placing the expected incisions within the relaxed skin tension lines where possible.    The area thus outlined was incised deep to adipose tissue with a #15 scalpel blade.  The skin margins were undermined to an appropriate distance in all directions utilizing iris scissors.
Mucosal Advancement Flap Text: Given the location of the defect, shape of the defect and the proximity to free margins a mucosal advancement flap was deemed most appropriate. Incisions were made with a 15 blade scalpel in the appropriate fashion along the cutaneous vermilion border and the mucosal lip. The remaining actinically damaged mucosal tissue was excised.  The mucosal advancement flap was then elevated to the gingival sulcus with care taken to preserve the neurovascular structures and advanced into the primary defect. Care was taken to ensure that precise realignment of the vermilion border was achieved.
Muscle Hinge Flap Text: The defect edges were debeveled with a #15 scalpel blade.  Given the size, depth and location of the defect and the proximity to free margins a muscle hinge flap was deemed most appropriate.  Using a sterile surgical marker, an appropriate hinge flap was drawn incorporating the defect. The area thus outlined was incised with a #15 scalpel blade.  The skin margins were undermined to an appropriate distance in all directions utilizing iris scissors.
Mustarde Flap Text: The defect edges were debeveled with a #15 scalpel blade.  Given the size, depth and location of the defect and the proximity to free margins a Mustarde flap was deemed most appropriate.  Using a sterile surgical marker, an appropriate flap was drawn incorporating the defect. The area thus outlined was incised with a #15 scalpel blade.  The skin margins were undermined to an appropriate distance in all directions utilizing iris scissors.
Nasal Turnover Hinge Flap Text: The defect edges were debeveled with a #15 scalpel blade.  Given the size, depth, location of the defect and the defect being full thickness a nasal turnover hinge flap was deemed most appropriate.  Using a sterile surgical marker, an appropriate hinge flap was drawn incorporating the defect. The area thus outlined was incised with a #15 scalpel blade. The flap was designed to recreate the nasal mucosal lining and the alar rim. The skin margins were undermined to an appropriate distance in all directions utilizing iris scissors.
Nasalis-Muscle-Based Myocutaneous Island Pedicle Flap Text: Using a #15 blade, an incision was made around the donor flap to the level of the nasalis muscle. Wide lateral undermining was then performed in both the subcutaneous plane above the nasalis muscle, and in a submuscular plane just above periosteum. This allowed the formation of a free nasalis muscle axial pedicle (based on the angular artery) which was still attached to the actual cutaneous flap, increasing its mobility and vascular viability. Hemostasis was obtained with pinpoint electrocoagulation. The flap was mobilized into position and the pivotal anchor points positioned and stabilized with buried interrupted sutures. Subcutaneous and dermal tissues were closed in a multilayered fashion with sutures. Tissue redundancies were excised, and the epidermal edges were apposed without significant tension and sutured with sutures.
Nasalis Myocutaneous Flap Text: Using a #15 blade, an incision was made around the donor flap to the level of the nasalis muscle. Wide lateral undermining was then performed in both the subcutaneous plane above the nasalis muscle, and in a submuscular plane just above periosteum. This allowed the formation of a free nasalis muscle axial pedicle which was still attached to the actual cutaneous flap, increasing its mobility and vascular viability. Hemostasis was obtained with pinpoint electrocoagulation. The flap was mobilized into position and the pivotal anchor points positioned and stabilized with buried interrupted sutures. Subcutaneous and dermal tissues were closed in a multilayered fashion with sutures. Tissue redundancies were excised, and the epidermal edges were apposed without significant tension and sutured with sutures.
Nasolabial Transposition Flap Text: The defect edges were debeveled with a #15 scalpel blade.  Given the size, depth and location of the defect and the proximity to free margins a nasolabial transposition flap was deemed most appropriate. Using a sterile surgical marker, an appropriate flap was drawn incorporating the defect. The area thus outlined was incised with a #15 scalpel blade. The skin margins were undermined to an appropriate distance in all directions utilizing iris scissors. Following this, the designed flap was carried into the primary defect and sutured into place.
Orbicularis Oris Muscle Flap Text: The defect edges were debeveled with a #15 scalpel blade.  Given that the defect affected the competency of the oral sphincter an orbicularis oris muscle flap was deemed most appropriate to restore this competency and normal muscle function.  Using a sterile surgical marker, an appropriate flap was drawn incorporating the defect. The area thus outlined was incised with a #15 scalpel blade.
O-T Advancement Flap Text: The defect edges were debeveled with a #15 scalpel blade.  Given the location of the defect, shape of the defect and the proximity to free margins an O-T advancement flap was deemed most appropriate.  Using a sterile surgical marker, an appropriate advancement flap was drawn incorporating the defect and placing the expected incisions within the relaxed skin tension lines where possible.    The area thus outlined was incised deep to adipose tissue with a #15 scalpel blade.  The skin margins were undermined to an appropriate distance in all directions utilizing iris scissors.
O-T Plasty Text: The defect edges were debeveled with a #15 scalpel blade.  Given the location of the defect, shape of the defect and the proximity to free margins an O-T plasty was deemed most appropriate.  Using a sterile surgical marker, an appropriate O-T plasty was drawn incorporating the defect and placing the expected incisions within the relaxed skin tension lines where possible.    The area thus outlined was incised deep to adipose tissue with a #15 scalpel blade.  The skin margins were undermined to an appropriate distance in all directions utilizing iris scissors.
O-L Flap Text: The defect edges were debeveled with a #15 scalpel blade.  Given the location of the defect, shape of the defect and the proximity to free margins an O-L flap was deemed most appropriate.  Using a sterile surgical marker, an appropriate advancement flap was drawn incorporating the defect and placing the expected incisions within the relaxed skin tension lines where possible.    The area thus outlined was incised deep to adipose tissue with a #15 scalpel blade.  The skin margins were undermined to an appropriate distance in all directions utilizing iris scissors.
O-Z Flap Text: The defect edges were debeveled with a #15 scalpel blade.  Given the location of the defect, shape of the defect and the proximity to free margins an O-Z flap was deemed most appropriate.  Using a sterile surgical marker, an appropriate transposition flap was drawn incorporating the defect and placing the expected incisions within the relaxed skin tension lines where possible. The area thus outlined was incised deep to adipose tissue with a #15 scalpel blade.  The skin margins were undermined to an appropriate distance in all directions utilizing iris scissors.
O-Z Plasty Text: The defect edges were debeveled with a #15 scalpel blade.  Given the location of the defect, shape of the defect and the proximity to free margins an O-Z plasty (double transposition flap) was deemed most appropriate.  Using a sterile surgical marker, the appropriate transposition flaps were drawn incorporating the defect and placing the expected incisions within the relaxed skin tension lines where possible.    The area thus outlined was incised deep to adipose tissue with a #15 scalpel blade.  The skin margins were undermined to an appropriate distance in all directions utilizing iris scissors.  Hemostasis was achieved with electrocautery.  The flaps were then transposed into place, one clockwise and the other counterclockwise, and anchored with interrupted buried subcutaneous sutures.
Peng Advancement Flap Text: The defect edges were debeveled with a #15 scalpel blade.  Given the location of the defect, shape of the defect and the proximity to free margins a Peng advancement flap was deemed most appropriate.  Using a sterile surgical marker, an appropriate advancement flap was drawn incorporating the defect and placing the expected incisions within the relaxed skin tension lines where possible. The area thus outlined was incised deep to adipose tissue with a #15 scalpel blade.  The skin margins were undermined to an appropriate distance in all directions utilizing iris scissors.
Rectangular Flap Text: The defect edges were debeveled with a #15 scalpel blade. Given the location of the defect and the proximity to free margins a rectangular flap was deemed most appropriate. Using a sterile surgical marker, an appropriate rectangular flap was drawn incorporating the defect. The area thus outlined was incised deep to adipose tissue with a #15 scalpel blade. The skin margins were undermined to an appropriate distance in all directions utilizing iris scissors. Following this, the designed flap was carried over into the primary defect and sutured into place.
Rhombic Flap Text: The defect edges were debeveled with a #15 scalpel blade.  Given the location of the defect and the proximity to free margins a rhombic flap was deemed most appropriate.  Using a sterile surgical marker, an appropriate rhombic flap was drawn incorporating the defect.    The area thus outlined was incised deep to adipose tissue with a #15 scalpel blade.  The skin margins were undermined to an appropriate distance in all directions utilizing iris scissors.
Rhomboid Transposition Flap Text: The defect edges were debeveled with a #15 scalpel blade.  Given the location of the defect and the proximity to free margins a rhomboid transposition flap was deemed most appropriate.  Using a sterile surgical marker, an appropriate rhomboid flap was drawn incorporating the defect.    The area thus outlined was incised deep to adipose tissue with a #15 scalpel blade.  The skin margins were undermined to an appropriate distance in all directions utilizing iris scissors.
Rotation Flap Text: The defect edges were debeveled with a #15 scalpel blade.  Given the location of the defect, shape of the defect and the proximity to free margins a rotation flap was deemed most appropriate.  Using a sterile surgical marker, an appropriate rotation flap was drawn incorporating the defect and placing the expected incisions within the relaxed skin tension lines where possible.    The area thus outlined was incised deep to adipose tissue with a #15 scalpel blade.  The skin margins were undermined to an appropriate distance in all directions utilizing iris scissors.
Spiral Flap Text: The defect edges were debeveled with a #15 scalpel blade.  Given the location of the defect, shape of the defect and the proximity to free margins a spiral flap was deemed most appropriate.  Using a sterile surgical marker, an appropriate rotation flap was drawn incorporating the defect and placing the expected incisions within the relaxed skin tension lines where possible. The area thus outlined was incised deep to adipose tissue with a #15 scalpel blade.  The skin margins were undermined to an appropriate distance in all directions utilizing iris scissors.
Staged Advancement Flap Text: The defect edges were debeveled with a #15 scalpel blade.  Given the location of the defect, shape of the defect and the proximity to free margins a staged advancement flap was deemed most appropriate.  Using a sterile surgical marker, an appropriate advancement flap was drawn incorporating the defect and placing the expected incisions within the relaxed skin tension lines where possible. The area thus outlined was incised deep to adipose tissue with a #15 scalpel blade.  The skin margins were undermined to an appropriate distance in all directions utilizing iris scissors.
Star Wedge Flap Text: The defect edges were debeveled with a #15 scalpel blade.  Given the location of the defect, shape of the defect and the proximity to free margins a star wedge flap was deemed most appropriate.  Using a sterile surgical marker, an appropriate rotation flap was drawn incorporating the defect and placing the expected incisions within the relaxed skin tension lines where possible. The area thus outlined was incised deep to adipose tissue with a #15 scalpel blade.  The skin margins were undermined to an appropriate distance in all directions utilizing iris scissors.
Transposition Flap Text: The defect edges were debeveled with a #15 scalpel blade.  Given the location of the defect and the proximity to free margins a transposition flap was deemed most appropriate.  Using a sterile surgical marker, an appropriate transposition flap was drawn incorporating the defect.    The area thus outlined was incised deep to adipose tissue with a #15 scalpel blade.  The skin margins were undermined to an appropriate distance in all directions utilizing iris scissors.
Trilobed Flap Text: The defect edges were debeveled with a #15 scalpel blade.  Given the location of the defect and the proximity to free margins a trilobed flap was deemed most appropriate.  Using a sterile surgical marker, an appropriate trilobed flap drawn around the defect.    The area thus outlined was incised deep to adipose tissue with a #15 scalpel blade.  The skin margins were undermined to an appropriate distance in all directions utilizing iris scissors.
V-Y Flap Text: The defect edges were debeveled with a #15 scalpel blade.  Given the location of the defect, shape of the defect and the proximity to free margins a V-Y flap was deemed most appropriate.  Using a sterile surgical marker, an appropriate advancement flap was drawn incorporating the defect and placing the expected incisions within the relaxed skin tension lines where possible.    The area thus outlined was incised deep to adipose tissue with a #15 scalpel blade.  The skin margins were undermined to an appropriate distance in all directions utilizing iris scissors.
V-Y Plasty Text: The defect edges were debeveled with a #15 scalpel blade.  Given the location of the defect, shape of the defect and the proximity to free margins an V-Y advancement flap was deemed most appropriate.  Using a sterile surgical marker, an appropriate advancement flap was drawn incorporating the defect and placing the expected incisions within the relaxed skin tension lines where possible.    The area thus outlined was incised deep to adipose tissue with a #15 scalpel blade.  The skin margins were undermined to an appropriate distance in all directions utilizing iris scissors.
W Plasty Text: The lesion was extirpated to the level of the fat with a #15 scalpel blade.  Given the location of the defect, shape of the defect and the proximity to free margins a W-plasty was deemed most appropriate for repair.  Using a sterile surgical marker, the appropriate transposition arms of the W-plasty were drawn incorporating the defect and placing the expected incisions within the relaxed skin tension lines where possible.    The area thus outlined was incised deep to adipose tissue with a #15 scalpel blade.  The skin margins were undermined to an appropriate distance in all directions utilizing iris scissors.  The opposing transposition arms were then transposed into place in opposite direction and anchored with interrupted buried subcutaneous sutures.
Z Plasty Text: The lesion was extirpated to the level of the fat with a #15 scalpel blade.  Given the location of the defect, shape of the defect and the proximity to free margins a Z-plasty was deemed most appropriate for repair.  Using a sterile surgical marker, the appropriate transposition arms of the Z-plasty were drawn incorporating the defect and placing the expected incisions within the relaxed skin tension lines where possible.    The area thus outlined was incised deep to adipose tissue with a #15 scalpel blade.  The skin margins were undermined to an appropriate distance in all directions utilizing iris scissors.  The opposing transposition arms were then transposed into place in opposite direction and anchored with interrupted buried subcutaneous sutures.
Zygomaticofacial Flap Text: Given the location of the defect, shape of the defect and the proximity to free margins a zygomaticofacial flap was deemed most appropriate for repair.  Using a sterile surgical marker, the appropriate flap was drawn incorporating the defect and placing the expected incisions within the relaxed skin tension lines where possible. The area thus outlined was incised deep to adipose tissue with a #15 scalpel blade with preservation of a vascular pedicle.  The skin margins were undermined to an appropriate distance in all directions utilizing iris scissors.  The flap was then placed into the defect and anchored with interrupted buried subcutaneous sutures.
Abbe Flap (Lower To Upper Lip) Text: The defect of the upper lip was assessed and measured.  Given the location and size of the defect, an Abbe flap was deemed most appropriate.  Using a sterile surgical marker, an appropriate Abbe flap was measured and drawn on the lower lip. Local anesthesia was then infiltrated. A scalpel was then used to incise the upper lip through and through the skin, vermilion, muscle and mucosa, leaving the flap pedicled on the opposite side.  The flap was then rotated and transferred to the lower lip defect.  The flap was then sutured into place with a three layer technique, closing the orbicularis oris muscle layer with subcutaneous buried sutures, followed by a mucosal layer and an epidermal layer.
Abbe Flap (Upper To Lower Lip) Text: The defect of the lower lip was assessed and measured.  Given the location and size of the defect, an Abbe flap was deemed most appropriate.  Using a sterile surgical marker, an appropriate Abbe flap was measured and drawn on the upper lip. Local anesthesia was then infiltrated.  A scalpel was then used to incise the upper lip through and through the skin, vermilion, muscle and mucosa, leaving the flap pedicled on the opposite side.  The flap was then rotated and transferred to the lower lip defect.  The flap was then sutured into place with a three layer technique, closing the orbicularis oris muscle layer with subcutaneous buried sutures, followed by a mucosal layer and an epidermal layer.
Cheek Interpolation Flap Text: A decision was made to reconstruct the defect utilizing an interpolation axial flap and a staged reconstruction.  A telfa template was made of the defect.  This telfa template was then used to outline the Cheek Interpolation flap.  The donor area for the pedicle flap was then injected with anesthesia.  The flap was excised through the skin and subcutaneous tissue down to the layer of the underlying musculature.  The interpolation flap was carefully excised within this deep plane to maintain its blood supply.  The edges of the donor site were undermined.   The donor site was closed in a primary fashion.  The pedicle was then rotated into position and sutured.  Once the tube was sutured into place, adequate blood supply was confirmed with blanching and refill.  The pedicle was then wrapped with xeroform gauze and dressed appropriately with a telfa and gauze bandage to ensure continued blood supply and protect the attached pedicle.
Cheek-To-Nose Interpolation Flap Text: A decision was made to reconstruct the defect utilizing an interpolation axial flap and a staged reconstruction.  A telfa template was made of the defect.  This telfa template was then used to outline the Cheek-To-Nose Interpolation flap.  The donor area for the pedicle flap was then injected with anesthesia.  The flap was excised through the skin and subcutaneous tissue down to the layer of the underlying musculature.  The interpolation flap was carefully excised within this deep plane to maintain its blood supply.  The edges of the donor site were undermined.   The donor site was closed in a primary fashion.  The pedicle was then rotated into position and sutured.  Once the tube was sutured into place, adequate blood supply was confirmed with blanching and refill.  The pedicle was then wrapped with xeroform gauze and dressed appropriately with a telfa and gauze bandage to ensure continued blood supply and protect the attached pedicle.
Estlander Flap (Lower To Upper Lip) Text: The defect of the lower lip was assessed and measured.  Given the location and size of the defect, an Estlander flap was deemed most appropriate.  Using a sterile surgical marker, an appropriate Estlander flap was measured and drawn on the upper lip. Local anesthesia was then infiltrated. A scalpel was then used to incise the lateral aspect of the flap, through skin, muscle and mucosa, leaving the flap pedicled medially.  The flap was then rotated and positioned to fill the lower lip defect.  The flap was then sutured into place with a three layer technique, closing the orbicularis oris muscle layer with subcutaneous buried sutures, followed by a mucosal layer and an epidermal layer.
Interpolation Flap Text: A decision was made to reconstruct the defect utilizing an interpolation axial flap and a staged reconstruction.  A telfa template was made of the defect.  This telfa template was then used to outline the interpolation flap.  The donor area for the pedicle flap was then injected with anesthesia.  The flap was excised through the skin and subcutaneous tissue down to the layer of the underlying musculature.  The interpolation flap was carefully excised within this deep plane to maintain its blood supply.  The edges of the donor site were undermined.   The donor site was closed in a primary fashion.  The pedicle was then rotated into position and sutured.  Once the tube was sutured into place, adequate blood supply was confirmed with blanching and refill.  The pedicle was then wrapped with xeroform gauze and dressed appropriately with a telfa and gauze bandage to ensure continued blood supply and protect the attached pedicle.
Melolabial Interpolation Flap Text: A decision was made to reconstruct the defect utilizing an interpolation axial flap and a staged reconstruction.  A telfa template was made of the defect.  This telfa template was then used to outline the melolabial interpolation flap.  The donor area for the pedicle flap was then injected with anesthesia.  The flap was excised through the skin and subcutaneous tissue down to the layer of the underlying musculature.  The pedicle flap was carefully excised within this deep plane to maintain its blood supply.  The edges of the donor site were undermined.   The donor site was closed in a primary fashion.  The pedicle was then rotated into position and sutured.  Once the tube was sutured into place, adequate blood supply was confirmed with blanching and refill.  The pedicle was then wrapped with xeroform gauze and dressed appropriately with a telfa and gauze bandage to ensure continued blood supply and protect the attached pedicle.
Mastoid Interpolation Flap Text: A decision was made to reconstruct the defect utilizing an interpolation axial flap and a staged reconstruction.  A telfa template was made of the defect.  This telfa template was then used to outline the mastoid interpolation flap.  The donor area for the pedicle flap was then injected with anesthesia.  The flap was excised through the skin and subcutaneous tissue down to the layer of the underlying musculature.  The pedicle flap was carefully excised within this deep plane to maintain its blood supply.  The edges of the donor site were undermined.   The donor site was closed in a primary fashion.  The pedicle was then rotated into position and sutured.  Once the tube was sutured into place, adequate blood supply was confirmed with blanching and refill.  The pedicle was then wrapped with xeroform gauze and dressed appropriately with a telfa and gauze bandage to ensure continued blood supply and protect the attached pedicle.
Paramedian Forehead Flap Text: A decision was made to reconstruct the defect utilizing an interpolation axial flap and a staged reconstruction.  A telfa template was made of the defect.  This telfa template was then used to outline the paramedian forehead pedicle flap.  The donor area for the pedicle flap was then injected with anesthesia.  The flap was excised through the skin and subcutaneous tissue down to the layer of the underlying musculature.  The pedicle flap was carefully excised within this deep plane to maintain its blood supply.  The edges of the donor site were undermined.   The donor site was closed in a primary fashion.  The pedicle was then rotated into position and sutured.  Once the tube was sutured into place, adequate blood supply was confirmed with blanching and refill.  The pedicle was then wrapped with xeroform gauze and dressed appropriately with a telfa and gauze bandage to ensure continued blood supply and protect the attached pedicle.
Posterior Auricular Interpolation Flap Text: A decision was made to reconstruct the defect utilizing an interpolation axial flap and a staged reconstruction.  A telfa template was made of the defect.  This telfa template was then used to outline the posterior auricular interpolation flap.  The donor area for the pedicle flap was then injected with anesthesia.  The flap was excised through the skin and subcutaneous tissue down to the layer of the underlying musculature.  The pedicle flap was carefully excised within this deep plane to maintain its blood supply.  The edges of the donor site were undermined.   The donor site was closed in a primary fashion.  The pedicle was then rotated into position and sutured.  Once the tube was sutured into place, adequate blood supply was confirmed with blanching and refill.  The pedicle was then wrapped with xeroform gauze and dressed appropriately with a telfa and gauze bandage to ensure continued blood supply and protect the attached pedicle.
Cheiloplasty (Complex) Text: A decision was made to reconstruct the defect with a  cheiloplasty.  The defect was undermined extensively.  Additional orbicularis oris muscle was excised with a 15 blade scalpel.  The defect was converted into a full thickness wedge to facilite a better cosmetic result.  Small vessels were then tied off with 5-0 monocyrl. The orbicularis oris, superficial fascia, adipose and dermis were then reapproximated.  After the deeper layers were approximated the epidermis was reapproximated with particular care given to realign the vermilion border.
Cheiloplasty (Less Than 50%) Text: A decision was made to reconstruct the defect with a  cheiloplasty.  The defect was undermined extensively.  Additional orbicularis oris muscle was excised with a 15 blade scalpel.  The defect was converted into a full thickness wedge, of less than 50% of the vertical height of the lip, to facilite a better cosmetic result.  Small vessels were then tied off with 5-0 monocyrl. The orbicularis oris, superficial fascia, adipose and dermis were then reapproximated.  After the deeper layers were approximated the epidermis was reapproximated with particular care given to realign the vermilion border.
Ear Wedge Repair Text: A wedge excision was completed by carrying down an excision through the full thickness of the ear and cartilage with an inward facing Burow's triangle. The wound was then closed in a layered fashion.
Full Thickness Lip Wedge Repair (Flap) Text: Given the location of the defect and the proximity to free margins a full thickness wedge repair was deemed most appropriate.  Using a sterile surgical marker, the appropriate repair was drawn incorporating the defect and placing the expected incisions perpendicular to the vermilion border.  The vermilion border was also meticulously outlined to ensure appropriate reapproximation during the repair.  The area thus outlined was incised through and through with a #15 scalpel blade.  The muscularis and dermis were reaproximated with deep sutures following hemostasis. Care was taken to realign the vermilion border before proceeding with the superficial closure.  Once the vermilion was realigned the superfical and mucosal closure was finished.
Burow's Graft Text: The defect edges were debeveled with a #15 scalpel blade.  Given the location of the defect, shape of the defect, the proximity to free margins and the presence of a standing cone deformity a Burow's skin graft was deemed most appropriate. The standing cone was removed and this tissue was then trimmed to the shape of the primary defect. The adipose tissue was also removed until only dermis and epidermis were left.  The skin margins of the secondary defect were undermined to an appropriate distance in all directions utilizing iris scissors.  The secondary defect was closed with interrupted buried subcutaneous sutures.  The skin edges were then re-apposed with running  sutures.  The skin graft was then placed in the primary defect and oriented appropriately.
Cartilage Graft Text: The defect edges were debeveled with a #15 scalpel blade.  Given the location of the defect, shape of the defect, the fact the defect involved a full thickness cartilage defect a cartilage graft was deemed most appropriate.  An appropriate donor site was identified, cleansed, and anesthetized. The cartilage graft was then harvested and transferred to the recipient site, oriented appropriately and then sutured into place.  The secondary defect was then repaired using a primary closure.
Composite Graft Text: The defect edges were debeveled with a #15 scalpel blade.  Given the location of the defect, shape of the defect, the proximity to free margins and the fact the defect was full thickness a composite graft was deemed most appropriate.  The defect was outline and then transferred to the donor site.  A full thickness graft was then excised from the donor site. The graft was then placed in the primary defect, oriented appropriately and then sutured into place.  The secondary defect was then repaired using a primary closure.
Epidermal Autograft Text: The defect edges were debeveled with a #15 scalpel blade.  Given the location of the defect, shape of the defect and the proximity to free margins an epidermal autograft was deemed most appropriate.  Using a sterile surgical marker, the primary defect shape was transferred to the donor site. The epidermal graft was then harvested.  The skin graft was then placed in the primary defect and oriented appropriately.
Dermal Autograft Text: The defect edges were debeveled with a #15 scalpel blade.  Given the location of the defect, shape of the defect and the proximity to free margins a dermal autograft was deemed most appropriate.  Using a sterile surgical marker, the primary defect shape was transferred to the donor site. The area thus outlined was incised deep to adipose tissue with a #15 scalpel blade.  The harvested graft was then trimmed of adipose and epidermal tissue until only dermis was left.  The skin graft was then placed in the primary defect and oriented appropriately.
Ftsg Text: The defect edges were debeveled with a #15 scalpel blade.  Given the location of the defect, shape of the defect and the proximity to free margins a full thickness skin graft was deemed most appropriate.  Using a sterile surgical marker, the primary defect shape was transferred to the donor site. The area thus outlined was incised deep to adipose tissue with a #15 scalpel blade.  The harvested graft was then trimmed of adipose tissue until only dermis and epidermis was left.  The skin margins of the secondary defect were undermined to an appropriate distance in all directions utilizing iris scissors.  The secondary defect was closed with interrupted buried subcutaneous sutures.  The skin edges were then re-apposed with running  sutures.  The skin graft was then placed in the primary defect and oriented appropriately.
Pinch Graft Text: The defect edges were debeveled with a #15 scalpel blade. Given the location of the defect, shape of the defect and the proximity to free margins a pinch graft was deemed most appropriate. Using a sterile surgical marker, the primary defect shape was transferred to the donor site. The area thus outlined was incised deep to adipose tissue with a #15 scalpel blade.  The harvested graft was then trimmed of adipose tissue until only dermis and epidermis was left. The skin margins of the secondary defect were undermined to an appropriate distance in all directions utilizing iris scissors.  The secondary defect was closed with interrupted buried subcutaneous sutures.  The skin edges were then re-apposed with running  sutures.  The skin graft was then placed in the primary defect and oriented appropriately.
Skin Substitute Text: The defect edges were debeveled with a #15 scalpel blade.  Given the location of the defect, shape of the defect and the proximity to free margins a skin substitute graft was deemed most appropriate.  The graft material was trimmed to fit the size of the defect. The graft was then placed in the primary defect and oriented appropriately.
Split-Thickness Skin Graft Text: The defect edges were debeveled with a #15 scalpel blade.  Given the location of the defect, shape of the defect and the proximity to free margins a split thickness skin graft was deemed most appropriate.  Using a sterile surgical marker, the primary defect shape was transferred to the donor site. The split thickness graft was then harvested.  The skin graft was then placed in the primary defect and oriented appropriately.
Tissue Cultured Epidermal Autograft Text: The defect edges were debeveled with a #15 scalpel blade.  Given the location of the defect, shape of the defect and the proximity to free margins a tissue cultured epidermal autograft was deemed most appropriate.  The graft was then trimmed to fit the size of the defect.  The graft was then placed in the primary defect and oriented appropriately.
Xenograft Text: The defect edges were debeveled with a #15 scalpel blade.  Given the location of the defect, shape of the defect and the proximity to free margins a xenograft was deemed most appropriate.  The graft was then trimmed to fit the size of the defect.  The graft was then placed in the primary defect and oriented appropriately.
Complex Repair And Flap Additional Text (Will Appearing After The Standard Complex Repair Text): The complex repair was not sufficient to completely close the primary defect. The remaining additional defect was repaired with the flap mentioned below.
Complex Repair And Graft Additional Text (Will Appearing After The Standard Complex Repair Text): The complex repair was not sufficient to completely close the primary defect. The remaining additional defect was repaired with the graft mentioned below.
Eyelid Full Thickness Repair - 93018: The eyelid defect was full thickness which required a wedge repair of the eyelid. Special care was taken to ensure that the eyelid margin was realligned when placing sutures.
Eyelid Partial Thickness Repair - [id number]: The eyelid defect was partial thickness which required a wedge repair of the eyelid. Special care was taken to ensure that the eyelid margin was realligned when placing sutures.
Intermediate Repair And Flap Additional Text (Will Appearing After The Standard Complex Repair Text): The intermediate repair was not sufficient to completely close the primary defect. The remaining additional defect was repaired with the flap mentioned below.
Intermediate Repair And Graft Additional Text (Will Appearing After The Standard Complex Repair Text): The intermediate repair was not sufficient to completely close the primary defect. The remaining additional defect was repaired with the graft mentioned below.
Localized Dermabrasion With 15 Blade Text: The patient was draped in routine manner.  Localized dermabrasion using a 15 blade was performed in routine manner to papillary dermis. This spot dermabrasion is being performed to complete skin cancer reconstruction. It also will eliminate the other sun damaged precancerous cells that are known to be part of the regional effect of a lifetime's worth of sun exposure. This localized dermabrasion is therapeutic and should not be considered cosmetic in any regard.
Localized Dermabrasion With Sand Papertext: The patient was draped in routine manner.  Localized dermabrasion using sterile sand paper was performed in routine manner to papillary dermis. This spot dermabrasion is being performed to complete skin cancer reconstruction. It also will eliminate the other sun damaged precancerous cells that are known to be part of the regional effect of a lifetime's worth of sun exposure. This localized dermabrasion is therapeutic and should not be considered cosmetic in any regard.
Localized Dermabrasion With Wire Brush Text: The patient was draped in routine manner.  Localized dermabrasion using 3 x 17 mm wire brush was performed in routine manner to papillary dermis. This spot dermabrasion is being performed to complete skin cancer reconstruction. It also will eliminate the other sun damaged precancerous cells that are known to be part of the regional effect of a lifetime's worth of sun exposure. This localized dermabrasion is therapeutic and should not be considered cosmetic in any regard.
Purse String (Simple) Text: Given the location of the defect and the characteristics of the surrounding skin a purse string closure was deemed most appropriate.  Undermining was performed circumfirentially around the surgical defect.  A purse string suture was then placed and tightened.
Purse String (Intermediate) Text: Given the location of the defect and the characteristics of the surrounding skin a purse string intermediate closure was deemed most appropriate.  Undermining was performed circumfirentially around the surgical defect.  A purse string suture was then placed and tightened.
Partial Purse String (Simple) Text: Given the location of the defect and the characteristics of the surrounding skin a simple purse string closure was deemed most appropriate.  Undermining was performed circumfirentially around the surgical defect.  A purse string suture was then placed and tightened. Wound tension only allowed a partial closure of the circular defect.
Partial Purse String (Intermediate) Text: Given the location of the defect and the characteristics of the surrounding skin an intermediate purse string closure was deemed most appropriate.  Undermining was performed circumfirentially around the surgical defect.  A purse string suture was then placed and tightened. Wound tension only allowed a partial closure of the circular defect.
Tarsorrhaphy Text: A tarsorrhaphy was performed using Frost sutures.
Manual Repair Warning Statement: We plan on removing the manually selected variable below in favor of our much easier automatic structured text blocks found in the previous tab. We decided to do this to help make the flow better and give you the full power of structured data. Manual selection is never going to be ideal in our platform and I would encourage you to avoid using manual selection from this point on, especially since I will be sunsetting this feature. It is important that you do one of two things with the customized text below. First, you can save all of the text in a word file so you can have it for future reference. Second, transfer the text to the appropriate area in the Library tab. Lastly, if there is a flap or graft type which we do not have you need to let us know right away so I can add it in before the variable is hidden. No need to panic, we plan to give you roughly 6 months to make the change.
Same Histology In Subsequent Stages Text: The pattern and morphology of the tumor is the same as noted in the previous stage.
No Residual Tumor Seen Histology Text: There were no malignant cells seen in the sections examined.
Inflammation Suggestive Of Cancer Camouflage Histology Text: There was a dense lymphocytic infiltrate which prevented adequate histologic evaluation of adjacent structures. An additional layer is indicated to rule out definitive tumor involvement at the surgical margin.
Incidental Actinic Keratosis Histology Text: Incidentally, partial thickness keratinocytic atypia was noted at the surgical margin on this section. Given lack of full-thickness atypia, we will cease Mohs surgery and plan for topical therapy once the wound is healed.
Missing Epidermis Histology Text: After multiple sections through the Mohs tissue block, the full peripheral epidermal margin was unable to be fully evaluated. An additional section was obtained to evaluate the epidermal margin.
Bcc Histology Text: Numerous aggregates of atypical basaloid cells with clefting, peripheral palisading and myxoid stroma
Bcc Infiltrative Histology Text: There were numerous aggregates of basaloid cells demonstrating an infiltrative pattern.
Bcc Superficial Histology Text: Atypical basaloid aggregates emanating from the epidermis with palisading and clefting
Scc Histology Text: Dermal nodules and aggregates of atypical squamous cells
Scc Well Differentiated Histology Text: Atypical aggregates of keratinizing squamous cells extending into the dermis
Scc In Situ Histology Text: Full thickness epidermal atypia with parakeratosis and loss of the granular layer
Scc In Situ With Follicular Extension Histology Text: Atypia of the infundibular epithelium noted
Mart-1 - Positive Histology Text: MART-1 staining demonstrates areas of higher density and clustering of melanocytes with Pagetoid spread upwards within the epidermis. The surgical margins are positive for tumor cells.
Mart-1 - Negative Histology Text: MART-1 staining demonstrates a normal density and pattern of melanocytes along the dermal-epidermal junction. The surgical margins are negative for tumor cells.
Information: Selecting Yes will display possible errors in your note based on the variables you have selected. This validation is only offered as a suggestion for you. PLEASE NOTE THAT THE VALIDATION TEXT WILL BE REMOVED WHEN YOU FINALIZE YOUR NOTE. IF YOU WANT TO FAX A PRELIMINARY NOTE YOU WILL NEED TO TOGGLE THIS TO 'NO' IF YOU DO NOT WANT IT IN YOUR FAXED NOTE.
Bill 59 Modifier?: No - Continue to Bill 79 Modifier

## 2025-02-07 ENCOUNTER — APPOINTMENT (OUTPATIENT)
Dept: URBAN - METROPOLITAN AREA CLINIC 170 | Facility: CLINIC | Age: 89
Setting detail: DERMATOLOGY
End: 2025-02-07

## 2025-02-07 DIAGNOSIS — Z48.02 ENCOUNTER FOR REMOVAL OF SUTURES: ICD-10-CM

## 2025-02-07 PROCEDURE — 99024 POSTOP FOLLOW-UP VISIT: CPT

## 2025-02-07 PROCEDURE — ? SUTURE REMOVAL (GLOBAL PERIOD)

## 2025-02-07 ASSESSMENT — LOCATION DETAILED DESCRIPTION DERM: LOCATION DETAILED: RIGHT SUPERIOR FOREHEAD

## 2025-02-07 ASSESSMENT — LOCATION ZONE DERM: LOCATION ZONE: FACE

## 2025-02-07 ASSESSMENT — LOCATION SIMPLE DESCRIPTION DERM: LOCATION SIMPLE: RIGHT FOREHEAD

## 2025-02-07 NOTE — PROCEDURE: SUTURE REMOVAL (GLOBAL PERIOD)
Detail Level: Detailed
Add 98791 Cpt? (Important Note: In 2017 The Use Of 66805 Is Being Tracked By Cms To Determine Future Global Period Reimbursement For Global Periods): yes

## 2025-03-17 ENCOUNTER — APPOINTMENT (OUTPATIENT)
Dept: URBAN - METROPOLITAN AREA CLINIC 166 | Facility: CLINIC | Age: 89
Setting detail: DERMATOLOGY
End: 2025-03-17

## 2025-03-17 PROBLEM — D04.4 CARCINOMA IN SITU OF SKIN OF SCALP AND NECK: Status: ACTIVE | Noted: 2025-03-17

## 2025-03-17 PROCEDURE — ? COUNSELING

## 2025-03-17 PROCEDURE — ? TREATMENT REGIMEN

## 2025-03-17 PROCEDURE — 99213 OFFICE O/P EST LOW 20 MIN: CPT

## 2025-03-17 NOTE — PROCEDURE: TREATMENT REGIMEN
Initiate Treatment: [place] forehead and scalp with Efudex cream twice daily for one week.
Detail Level: Zone

## 2025-04-04 ENCOUNTER — APPOINTMENT (OUTPATIENT)
Dept: URBAN - METROPOLITAN AREA CLINIC 170 | Facility: CLINIC | Age: 89
Setting detail: DERMATOLOGY
End: 2025-04-04

## 2025-04-04 DIAGNOSIS — D22 MELANOCYTIC NEVI: ICD-10-CM

## 2025-04-04 DIAGNOSIS — D18.0 HEMANGIOMA: ICD-10-CM

## 2025-04-04 DIAGNOSIS — Z85.828 PERSONAL HISTORY OF OTHER MALIGNANT NEOPLASM OF SKIN: ICD-10-CM

## 2025-04-04 DIAGNOSIS — L57.0 ACTINIC KERATOSIS: ICD-10-CM

## 2025-04-04 DIAGNOSIS — L82.1 OTHER SEBORRHEIC KERATOSIS: ICD-10-CM

## 2025-04-04 DIAGNOSIS — Z71.89 OTHER SPECIFIED COUNSELING: ICD-10-CM

## 2025-04-04 PROBLEM — D22.5 MELANOCYTIC NEVI OF TRUNK: Status: ACTIVE | Noted: 2025-04-04

## 2025-04-04 PROBLEM — D18.01 HEMANGIOMA OF SKIN AND SUBCUTANEOUS TISSUE: Status: ACTIVE | Noted: 2025-04-04

## 2025-04-04 PROCEDURE — ? COUNSELING

## 2025-04-04 PROCEDURE — 17000 DESTRUCT PREMALG LESION: CPT

## 2025-04-04 PROCEDURE — 99213 OFFICE O/P EST LOW 20 MIN: CPT | Mod: 25

## 2025-04-04 PROCEDURE — ? LIQUID NITROGEN

## 2025-04-04 PROCEDURE — 17003 DESTRUCT PREMALG LES 2-14: CPT

## 2025-04-04 ASSESSMENT — LOCATION DETAILED DESCRIPTION DERM
LOCATION DETAILED: SUPERIOR THORACIC SPINE
LOCATION DETAILED: RIGHT SUPERIOR PARIETAL SCALP
LOCATION DETAILED: LEFT CENTRAL FRONTAL SCALP
LOCATION DETAILED: MID-OCCIPITAL SCALP
LOCATION DETAILED: LEFT SUPERIOR PARIETAL SCALP
LOCATION DETAILED: LEFT SUPERIOR OCCIPITAL SCALP
LOCATION DETAILED: LEFT CENTRAL PARIETAL SCALP

## 2025-04-04 ASSESSMENT — LOCATION SIMPLE DESCRIPTION DERM
LOCATION SIMPLE: SCALP
LOCATION SIMPLE: LEFT OCCIPITAL SCALP
LOCATION SIMPLE: POSTERIOR SCALP
LOCATION SIMPLE: UPPER BACK
LOCATION SIMPLE: LEFT SCALP

## 2025-04-04 ASSESSMENT — LOCATION ZONE DERM
LOCATION ZONE: TRUNK
LOCATION ZONE: SCALP

## 2025-04-04 NOTE — PROCEDURE: LIQUID NITROGEN
Render Post-Care Instructions In Note?: yes
Number Of Freeze-Thaw Cycles: 3 freeze-thaw cycles
Consent: The patient's consent was obtained including but not limited to risks of crusting, scabbing, blistering, scarring, darker or lighter pigmentary change, recurrence, incomplete removal and infection.
Render Note In Bullet Format When Appropriate: No
Post-Care Instructions: I reviewed with the patient in detail post-care instructions. Patient is to wear sunprotection, and avoid picking at any of the treated lesions. Pt may apply Vaseline to crusted or scabbing areas.
Duration Of Freeze Thaw-Cycle (Seconds): 0
Detail Level: Simple

## 2025-08-13 DIAGNOSIS — R60.9 EDEMA, UNSPECIFIED TYPE: ICD-10-CM

## 2025-08-13 DIAGNOSIS — Z13.6 CARDIOVASCULAR SCREENING; LDL GOAL LESS THAN 100: ICD-10-CM

## 2025-08-13 DIAGNOSIS — I10 ESSENTIAL HYPERTENSION, BENIGN: ICD-10-CM

## 2025-08-13 RX ORDER — ATORVASTATIN CALCIUM 20 MG/1
20 TABLET, FILM COATED ORAL DAILY
Qty: 90 TABLET | Refills: 0 | OUTPATIENT
Start: 2025-08-13

## 2025-08-13 RX ORDER — IRBESARTAN AND HYDROCHLOROTHIAZIDE 150; 12.5 MG/1; MG/1
1 TABLET, FILM COATED ORAL DAILY
Qty: 90 TABLET | Refills: 0 | OUTPATIENT
Start: 2025-08-13